# Patient Record
Sex: FEMALE | Race: WHITE | NOT HISPANIC OR LATINO | Employment: FULL TIME | URBAN - METROPOLITAN AREA
[De-identification: names, ages, dates, MRNs, and addresses within clinical notes are randomized per-mention and may not be internally consistent; named-entity substitution may affect disease eponyms.]

---

## 2023-05-16 ENCOUNTER — OFFICE VISIT (OUTPATIENT)
Dept: PRIMARY CARE | Facility: CLINIC | Age: 43
End: 2023-05-16
Payer: COMMERCIAL

## 2023-05-16 VITALS
SYSTOLIC BLOOD PRESSURE: 120 MMHG | HEART RATE: 99 BPM | BODY MASS INDEX: 46.66 KG/M2 | WEIGHT: 231 LBS | DIASTOLIC BLOOD PRESSURE: 80 MMHG

## 2023-05-16 DIAGNOSIS — E03.9 ACQUIRED HYPOTHYROIDISM: ICD-10-CM

## 2023-05-16 DIAGNOSIS — K21.9 GASTROESOPHAGEAL REFLUX DISEASE WITHOUT ESOPHAGITIS: ICD-10-CM

## 2023-05-16 DIAGNOSIS — J45.20 MILD INTERMITTENT ASTHMA WITHOUT COMPLICATION (HHS-HCC): ICD-10-CM

## 2023-05-16 DIAGNOSIS — E55.9 VITAMIN D DEFICIENCY: ICD-10-CM

## 2023-05-16 DIAGNOSIS — Z00.00 ANNUAL PHYSICAL EXAM: Primary | ICD-10-CM

## 2023-05-16 DIAGNOSIS — F41.9 ANXIETY: ICD-10-CM

## 2023-05-16 DIAGNOSIS — I10 ESSENTIAL HYPERTENSION: ICD-10-CM

## 2023-05-16 DIAGNOSIS — E66.01 MORBIDLY OBESE (MULTI): ICD-10-CM

## 2023-05-16 DIAGNOSIS — E78.2 MIXED HYPERLIPIDEMIA: ICD-10-CM

## 2023-05-16 DIAGNOSIS — G47.33 OSA (OBSTRUCTIVE SLEEP APNEA): ICD-10-CM

## 2023-05-16 DIAGNOSIS — R60.0 LOCALIZED EDEMA: ICD-10-CM

## 2023-05-16 PROBLEM — R79.89 ABNORMAL TSH: Status: RESOLVED | Noted: 2023-05-16 | Resolved: 2023-05-16

## 2023-05-16 PROBLEM — J30.2 SEASONAL ALLERGIES: Status: ACTIVE | Noted: 2023-05-16

## 2023-05-16 PROBLEM — R87.810 ASCUS WITH POSITIVE HIGH RISK HPV CERVICAL: Status: ACTIVE | Noted: 2023-05-16

## 2023-05-16 PROBLEM — J30.9 ALLERGIC RHINITIS: Status: ACTIVE | Noted: 2023-05-16

## 2023-05-16 PROBLEM — E28.2 PCOS (POLYCYSTIC OVARIAN SYNDROME): Status: ACTIVE | Noted: 2023-05-16

## 2023-05-16 PROBLEM — R87.610 ASCUS WITH POSITIVE HIGH RISK HPV CERVICAL: Status: ACTIVE | Noted: 2023-05-16

## 2023-05-16 PROBLEM — F31.9 BIPOLAR AFFECTIVE DISORDER (MULTI): Status: ACTIVE | Noted: 2023-05-16

## 2023-05-16 PROBLEM — Z98.84 S/P BARIATRIC SURGERY: Status: ACTIVE | Noted: 2023-05-16

## 2023-05-16 PROBLEM — Z97.5 IUD (INTRAUTERINE DEVICE) IN PLACE: Status: ACTIVE | Noted: 2023-05-16

## 2023-05-16 PROBLEM — E78.5 HLD (HYPERLIPIDEMIA): Status: ACTIVE | Noted: 2023-05-16

## 2023-05-16 PROBLEM — K59.09 CHRONIC CONSTIPATION: Status: ACTIVE | Noted: 2023-05-16

## 2023-05-16 PROBLEM — M54.12 CERVICAL RADICULOPATHY: Status: ACTIVE | Noted: 2023-05-16

## 2023-05-16 PROBLEM — L68.0 HIRSUTISM: Status: ACTIVE | Noted: 2023-05-16

## 2023-05-16 PROBLEM — K22.70 BARRETT ESOPHAGUS: Status: ACTIVE | Noted: 2023-05-16

## 2023-05-16 PROBLEM — M54.2 CHRONIC CERVICAL PAIN: Status: ACTIVE | Noted: 2023-05-16

## 2023-05-16 PROBLEM — M19.90 ARTHRITIS: Status: ACTIVE | Noted: 2023-05-16

## 2023-05-16 PROBLEM — G89.29 CHRONIC CERVICAL PAIN: Status: ACTIVE | Noted: 2023-05-16

## 2023-05-16 PROBLEM — D06.9 CIN III (CERVICAL INTRAEPITHELIAL NEOPLASIA GRADE III) WITH SEVERE DYSPLASIA: Status: ACTIVE | Noted: 2023-05-16

## 2023-05-16 PROBLEM — R79.89 ABNORMAL TSH: Status: ACTIVE | Noted: 2023-05-16

## 2023-05-16 PROCEDURE — 99396 PREV VISIT EST AGE 40-64: CPT | Performed by: NURSE PRACTITIONER

## 2023-05-16 PROCEDURE — 3079F DIAST BP 80-89 MM HG: CPT | Performed by: NURSE PRACTITIONER

## 2023-05-16 PROCEDURE — 1036F TOBACCO NON-USER: CPT | Performed by: NURSE PRACTITIONER

## 2023-05-16 PROCEDURE — 99213 OFFICE O/P EST LOW 20 MIN: CPT | Performed by: NURSE PRACTITIONER

## 2023-05-16 PROCEDURE — 3008F BODY MASS INDEX DOCD: CPT | Performed by: NURSE PRACTITIONER

## 2023-05-16 PROCEDURE — 3074F SYST BP LT 130 MM HG: CPT | Performed by: NURSE PRACTITIONER

## 2023-05-16 RX ORDER — CALCIUM CIT/MAG/D3/ZN/COP/MANG 250-40-125
1 TABLET ORAL DAILY
COMMUNITY
Start: 2020-12-16

## 2023-05-16 RX ORDER — BENZOCAINE .13; .15; .5; 2 G/100G; G/100G; G/100G; G/100G
GEL ORAL
COMMUNITY
Start: 2022-11-14

## 2023-05-16 RX ORDER — FUROSEMIDE 20 MG/1
10 TABLET ORAL DAILY PRN
Qty: 30 TABLET | Refills: 0 | Status: SHIPPED | OUTPATIENT
Start: 2023-05-17 | End: 2024-03-06 | Stop reason: WASHOUT

## 2023-05-16 RX ORDER — LANOLIN ALCOHOL/MO/W.PET/CERES
1 CREAM (GRAM) TOPICAL DAILY
COMMUNITY
Start: 2020-12-16

## 2023-05-16 RX ORDER — ARIPIPRAZOLE 5 MG/1
0.5 TABLET ORAL DAILY
COMMUNITY
Start: 2022-12-07

## 2023-05-16 RX ORDER — FENTANYL 25 UG/1
PATCH TRANSDERMAL
COMMUNITY
Start: 2022-06-24 | End: 2023-10-06 | Stop reason: DRUGHIGH

## 2023-05-16 RX ORDER — MONTELUKAST SODIUM 10 MG/1
1 TABLET ORAL NIGHTLY
COMMUNITY
Start: 2023-04-19

## 2023-05-16 RX ORDER — CHOLECALCIFEROL (VITAMIN D3) 125 MCG
1 CAPSULE ORAL DAILY
COMMUNITY
End: 2024-03-06 | Stop reason: WASHOUT

## 2023-05-16 RX ORDER — SEMAGLUTIDE 0.25 MG/.5ML
0.25 INJECTION, SOLUTION SUBCUTANEOUS
Qty: 2 ML | Refills: 0 | Status: SHIPPED | OUTPATIENT
Start: 2023-05-16 | End: 2023-06-19 | Stop reason: ALTCHOICE

## 2023-05-16 RX ORDER — FOLIC ACID/VIT BCOMP,C/CU/ZINC 5-1.5-25MG
1 TABLET ORAL DAILY
COMMUNITY
Start: 2022-05-17 | End: 2024-03-06 | Stop reason: WASHOUT

## 2023-05-16 RX ORDER — MINERAL OIL
ENEMA (ML) RECTAL
COMMUNITY
Start: 2017-08-23

## 2023-05-16 RX ORDER — SERTRALINE HYDROCHLORIDE 100 MG/1
1 TABLET, FILM COATED ORAL DAILY
COMMUNITY

## 2023-05-16 RX ORDER — MYCOPHENOLATE MOFETIL 250 MG/1
CAPSULE ORAL
COMMUNITY
Start: 2022-12-09 | End: 2023-10-02 | Stop reason: SDUPTHER

## 2023-05-16 RX ORDER — OXYCODONE HYDROCHLORIDE 5 MG/1
1 TABLET ORAL EVERY 6 HOURS PRN
COMMUNITY
Start: 2021-09-17 | End: 2023-10-06 | Stop reason: SDUPTHER

## 2023-05-16 RX ORDER — ALBUTEROL SULFATE 90 UG/1
AEROSOL, METERED RESPIRATORY (INHALATION)
COMMUNITY
Start: 2018-04-11

## 2023-05-16 RX ORDER — TOPIRAMATE 100 MG/1
1 TABLET, COATED ORAL DAILY
COMMUNITY
Start: 2023-02-22 | End: 2023-11-21 | Stop reason: SDUPTHER

## 2023-05-16 RX ORDER — LEVOTHYROXINE SODIUM 200 UG/1
TABLET ORAL
COMMUNITY
Start: 2018-12-07 | End: 2023-06-20 | Stop reason: SDUPTHER

## 2023-05-16 RX ORDER — LORAZEPAM 0.5 MG/1
1 TABLET ORAL 3 TIMES DAILY PRN
COMMUNITY
Start: 2021-09-17 | End: 2023-10-06 | Stop reason: SDUPTHER

## 2023-05-16 RX ORDER — LAMOTRIGINE 100 MG/1
1 TABLET ORAL DAILY
COMMUNITY
Start: 2022-11-03

## 2023-05-16 NOTE — PROGRESS NOTES
"Subjective   Reason for Visit: Isaias Chamorro is an 42 y.o. female here for a CPE     Chief Complaint   Patient presents with    Annual Exam     ISAIAS IS HERE TODAY FOR YEARLY CPE.   PT WOULD LIKE TO DISCUSS WATER RETENTION IN HER LEGS THAT HAS GOTTEN WORSE.        HPI  Est 43 yo female presents today for CPE    Pt was last seen in 2022 which was following hospitalization for Pneumonitis  Pt was tapering steroids at that time  Started on Victoza due to elevated FBG  Was wearing O2 until     Pt saw GYN last week for colposcopy d/t ASCUS/HRHPV + and had IUD replaced (due out 2031)  MAMM: DUE 2024    Pt states she was on steroids for @ 10 months, just weaned off @ 1-2 weeks ago  Very tearful in office today d/t weight gain   States she is joining the [employer] tomorrow   Pt states Wegovy is $40 for her so she would like to try it     Pt c/o \"fluid build up in her BLE\"   Denies SOB at rest  Will get slight SOB with exertion  Off home O2 x 1 month    Pt is Rx Fentanyl and Roxicodone for pain @ left shoulder area that was c/b HL tumor Rx are per Palliative Care, Clare MERRITT at [facility]     Labs from April reviewed with pt during OV today    Patient Care Team:  SAVANAH Jacobson as PCP - General (Family Medicine)  SAVANAH Maldonado as PCP - [place] [facility] P[facility]     Review of Systems  All 13 systems were reviewed and are within normal limits except positive and pertinent negative responses which are noted below or in HPI.      Objective   Vitals:  /80   Pulse 99   Wt 105 kg (231 lb)   BMI 46.66 kg/m²       Physical Exam  Vitals reviewed.   HENT:      Mouth/Throat:      Mouth: Mucous membranes are moist.   Cardiovascular:      Pulses: Normal pulses.   Pulmonary:      Effort: Pulmonary effort is normal.   Musculoskeletal:      Right lower le+ Edema present.      Left lower le+ Edema present.   Skin:     General: Skin is warm and dry.   Neurological:      Mental Status: She is alert. "   Psychiatric:      Comments: Tearful , Frustrated          Assessment/Plan   Problem List Items Addressed This Visit          Nervous    RESOLVED: LELIA (obstructive sleep apnea)       Respiratory    Mild intermittent asthma     Rx Albuterol PRN             Circulatory    RESOLVED: Essential hypertension    Relevant Orders    Potassium       Digestive    RESOLVED: GERD (gastroesophageal reflux disease)       Endocrine/Metabolic    Hypothyroidism     Rx Levothyroxine 200 mcg x 5 days/week  Due for TSH          Relevant Orders    TSH with reflex to Free T4 if abnormal    Vitamin D deficiency       Other    Anxiety    HLD (hyperlipidemia)     Other Visit Diagnoses       Annual physical exam    -  Primary    BMI 45.0-49.9, adult (CMS/[facility])        Relevant Medications    semaglutide, weight loss, (Wegovy) 0.25 mg/0.5 mL pen injector    Morbidly obese (CMS/[facility])        Relevant Medications    semaglutide, weight loss, (Wegovy) 0.25 mg/0.5 mL pen injector    Localized edema        Relevant Medications    furosemide (Lasix) 20 mg tablet (Start on 5/17/2023)    Other Relevant Orders    Potassium    Compression Stockings 18-30 mmHg

## 2023-05-16 NOTE — ASSESSMENT & PLAN NOTE
Dx'd 2013  Seeing Dr. Clark (pulm) and Shilpa (Oncology) at [facility]   Remission x 2 years (since 2021)

## 2023-05-16 NOTE — PATIENT INSTRUCTIONS
Thank you for seeing me today.  It was a pleasure to see you again!    Today we did your Annual Physical/Medicare Wellness Exam and discussed the following:     Continue all medications    Begin Lasix 10 mg PRN for LE edema  Get lab draw in 3 weeks to check potassium     Begin Wegovy for weight loss    Get compression stockings     RTC 3 MONTHS

## 2023-06-15 ENCOUNTER — LAB (OUTPATIENT)
Dept: LAB | Facility: LAB | Age: 43
End: 2023-06-15
Payer: COMMERCIAL

## 2023-06-15 DIAGNOSIS — E03.9 ACQUIRED HYPOTHYROIDISM: ICD-10-CM

## 2023-06-15 DIAGNOSIS — R60.0 LOCALIZED EDEMA: ICD-10-CM

## 2023-06-15 DIAGNOSIS — I10 ESSENTIAL HYPERTENSION: ICD-10-CM

## 2023-06-15 LAB
POTASSIUM (MMOL/L) IN SER/PLAS: 3.7 MMOL/L (ref 3.5–5.3)
THYROTROPIN (MIU/L) IN SER/PLAS BY DETECTION LIMIT <= 0.05 MIU/L: 53 MIU/L (ref 0.44–3.98)
THYROXINE (T4) FREE (NG/DL) IN SER/PLAS: 0.52 NG/DL (ref 0.61–1.12)

## 2023-06-15 PROCEDURE — 84439 ASSAY OF FREE THYROXINE: CPT

## 2023-06-15 PROCEDURE — 84132 ASSAY OF SERUM POTASSIUM: CPT

## 2023-06-15 PROCEDURE — 36415 COLL VENOUS BLD VENIPUNCTURE: CPT

## 2023-06-15 PROCEDURE — 84443 ASSAY THYROID STIM HORMONE: CPT

## 2023-06-19 DIAGNOSIS — E66.01 MORBID OBESITY WITH BODY MASS INDEX (BMI) OF 45.0 TO 49.9 IN ADULT (MULTI): ICD-10-CM

## 2023-06-19 DIAGNOSIS — E28.2 PCOS (POLYCYSTIC OVARIAN SYNDROME): Primary | ICD-10-CM

## 2023-06-19 RX ORDER — SEMAGLUTIDE 0.5 MG/.5ML
0.5 INJECTION, SOLUTION SUBCUTANEOUS
Qty: 2 ML | Refills: 0 | Status: SHIPPED | OUTPATIENT
Start: 2023-06-19 | End: 2023-08-15 | Stop reason: SDUPTHER

## 2023-06-20 DIAGNOSIS — E03.9 ACQUIRED HYPOTHYROIDISM: Primary | ICD-10-CM

## 2023-06-20 RX ORDER — LEVOTHYROXINE SODIUM 200 UG/1
200 TABLET ORAL
Qty: 90 TABLET | Refills: 0 | Status: SHIPPED | OUTPATIENT
Start: 2023-06-20 | End: 2023-08-30 | Stop reason: SDUPTHER

## 2023-08-15 DIAGNOSIS — E66.01 MORBID OBESITY WITH BODY MASS INDEX (BMI) OF 45.0 TO 49.9 IN ADULT (MULTI): ICD-10-CM

## 2023-08-15 DIAGNOSIS — J45.20 MILD INTERMITTENT ASTHMA, UNSPECIFIED WHETHER COMPLICATED (HHS-HCC): Primary | ICD-10-CM

## 2023-08-15 RX ORDER — SEMAGLUTIDE 0.5 MG/.5ML
0.5 INJECTION, SOLUTION SUBCUTANEOUS
Qty: 2 ML | Refills: 0 | Status: SHIPPED | OUTPATIENT
Start: 2023-08-15 | End: 2023-11-06 | Stop reason: ALTCHOICE

## 2023-08-26 ENCOUNTER — LAB (OUTPATIENT)
Dept: LAB | Facility: LAB | Age: 43
End: 2023-08-26
Payer: COMMERCIAL

## 2023-08-26 DIAGNOSIS — E03.9 ACQUIRED HYPOTHYROIDISM: ICD-10-CM

## 2023-08-26 LAB
THYROTROPIN (MIU/L) IN SER/PLAS BY DETECTION LIMIT <= 0.05 MIU/L: 6.22 MIU/L (ref 0.44–3.98)
THYROXINE (T4) FREE (NG/DL) IN SER/PLAS: 0.85 NG/DL (ref 0.61–1.12)

## 2023-08-26 PROCEDURE — 84439 ASSAY OF FREE THYROXINE: CPT

## 2023-08-26 PROCEDURE — 84443 ASSAY THYROID STIM HORMONE: CPT

## 2023-08-26 PROCEDURE — 36415 COLL VENOUS BLD VENIPUNCTURE: CPT

## 2023-08-30 DIAGNOSIS — E03.9 ACQUIRED HYPOTHYROIDISM: ICD-10-CM

## 2023-08-30 RX ORDER — LEVOTHYROXINE SODIUM 200 UG/1
TABLET ORAL
Qty: 96 TABLET | Refills: 3 | Status: SHIPPED | OUTPATIENT
Start: 2023-08-30 | End: 2024-03-14 | Stop reason: SDUPTHER

## 2023-09-12 PROBLEM — Z99.81 ON HOME OXYGEN THERAPY: Status: ACTIVE | Noted: 2023-09-12

## 2023-09-12 PROBLEM — J98.4 DRUG-INDUCED PNEUMONITIS: Status: ACTIVE | Noted: 2023-09-12

## 2023-09-12 PROBLEM — F32.A ANXIETY AND DEPRESSION: Status: ACTIVE | Noted: 2023-09-12

## 2023-09-12 PROBLEM — J96.91 RESPIRATORY FAILURE WITH HYPOXIA (MULTI): Status: ACTIVE | Noted: 2023-09-12

## 2023-09-12 PROBLEM — L30.9 ECZEMA: Status: ACTIVE | Noted: 2023-09-12

## 2023-09-12 PROBLEM — L91.0 HYPERTROPHIC SCAR: Status: ACTIVE | Noted: 2021-09-27

## 2023-09-12 PROBLEM — E11.9 DIABETES MELLITUS WITHOUT COMPLICATION (MULTI): Status: ACTIVE | Noted: 2023-09-12

## 2023-09-12 PROBLEM — J96.00 ACUTE RESPIRATORY FAILURE (MULTI): Status: ACTIVE | Noted: 2023-09-12

## 2023-09-12 PROBLEM — D73.5 SPLENIC INFARCTION: Status: ACTIVE | Noted: 2023-09-12

## 2023-09-12 PROBLEM — R09.02 HYPOXEMIA REQUIRING SUPPLEMENTAL OXYGEN: Status: ACTIVE | Noted: 2023-09-12

## 2023-09-12 PROBLEM — E66.01 MORBID OBESITY (MULTI): Status: ACTIVE | Noted: 2023-09-12

## 2023-09-12 PROBLEM — G62.9 POLYNEUROPATHY: Status: ACTIVE | Noted: 2023-09-12

## 2023-09-12 PROBLEM — R09.02 HYPOXIA: Status: ACTIVE | Noted: 2023-09-12

## 2023-09-12 PROBLEM — J38.3 VOCAL CORD DYSFUNCTION: Status: ACTIVE | Noted: 2023-09-12

## 2023-09-12 PROBLEM — M79.7 FIBROMYALGIA: Status: ACTIVE | Noted: 2023-09-12

## 2023-09-12 PROBLEM — D63.8 ANEMIA IN OTHER CHRONIC DISEASES CLASSIFIED ELSEWHERE: Status: ACTIVE | Noted: 2023-09-12

## 2023-09-12 PROBLEM — K90.9 INTESTINAL MALABSORPTION (HHS-HCC): Status: ACTIVE | Noted: 2023-09-12

## 2023-09-12 PROBLEM — J45.909 ASTHMA (HHS-HCC): Status: ACTIVE | Noted: 2023-09-12

## 2023-09-12 PROBLEM — M51.34 THORACIC DEGENERATIVE DISC DISEASE: Status: ACTIVE | Noted: 2023-09-12

## 2023-09-12 PROBLEM — J98.59 MEDIASTINAL MASS: Status: ACTIVE | Noted: 2023-09-12

## 2023-09-12 PROBLEM — J45.991 COUGH VARIANT ASTHMA (HHS-HCC): Status: ACTIVE | Noted: 2023-09-12

## 2023-09-12 PROBLEM — T50.905A DRUG-INDUCED PNEUMONITIS: Status: ACTIVE | Noted: 2023-09-12

## 2023-09-12 PROBLEM — E87.6 CHRONIC HYPOKALEMIA: Status: ACTIVE | Noted: 2023-09-12

## 2023-09-12 PROBLEM — N39.3 URINARY, INCONTINENCE, STRESS FEMALE: Status: ACTIVE | Noted: 2023-09-12

## 2023-09-12 PROBLEM — Z99.81 HYPOXEMIA REQUIRING SUPPLEMENTAL OXYGEN: Status: ACTIVE | Noted: 2023-09-12

## 2023-09-12 PROBLEM — F11.10 OPIOID ABUSE (MULTI): Status: ACTIVE | Noted: 2023-09-12

## 2023-09-12 PROBLEM — R93.89 ABNORMAL COMPUTERIZED AXIAL TOMOGRAPHY OF CHEST: Status: ACTIVE | Noted: 2023-09-12

## 2023-09-12 PROBLEM — M79.2: Status: ACTIVE | Noted: 2023-09-12

## 2023-09-12 PROBLEM — J18.9 MULTIFOCAL PNEUMONIA: Status: ACTIVE | Noted: 2023-09-12

## 2023-09-12 PROBLEM — K13.79 UVULAR SWELLING: Status: ACTIVE | Noted: 2023-09-12

## 2023-09-12 PROBLEM — I42.9 MYOCARDIOPATHY (MULTI): Status: ACTIVE | Noted: 2023-09-12

## 2023-09-12 PROBLEM — Z94.81 STATUS POST BONE MARROW TRANSPLANT (MULTI): Status: ACTIVE | Noted: 2023-09-12

## 2023-09-12 PROBLEM — F41.9 ANXIETY AND DEPRESSION: Status: ACTIVE | Noted: 2023-09-12

## 2023-09-12 RX ORDER — OMEPRAZOLE 40 MG/1
40 CAPSULE, DELAYED RELEASE ORAL
COMMUNITY
Start: 2021-08-12 | End: 2024-03-06 | Stop reason: WASHOUT

## 2023-09-12 RX ORDER — MULTIVIT-MIN/IRON/FOLIC ACID/K 45-800-120
CAPSULE ORAL 2 TIMES DAILY
COMMUNITY
End: 2024-03-06 | Stop reason: WASHOUT

## 2023-09-12 RX ORDER — OMEPRAZOLE 20 MG/1
20 CAPSULE, DELAYED RELEASE ORAL
COMMUNITY
End: 2023-10-06 | Stop reason: DRUGHIGH

## 2023-09-12 RX ORDER — CYANOCOBALAMIN (VITAMIN B-12) 500 MCG
2 TABLET ORAL DAILY
COMMUNITY
Start: 2020-12-14 | End: 2024-03-06 | Stop reason: WASHOUT

## 2023-09-12 RX ORDER — ONDANSETRON 4 MG/1
4 TABLET, FILM COATED ORAL EVERY 4 HOURS PRN
COMMUNITY
Start: 2012-12-24 | End: 2023-10-06 | Stop reason: SDUPTHER

## 2023-09-12 RX ORDER — POTASSIUM CHLORIDE 20 MEQ/1
20 TABLET, EXTENDED RELEASE ORAL 2 TIMES DAILY
COMMUNITY
End: 2024-03-06 | Stop reason: WASHOUT

## 2023-09-12 RX ORDER — FENTANYL 12.5 UG/1
PATCH TRANSDERMAL
COMMUNITY
Start: 2023-08-11 | End: 2023-10-06 | Stop reason: SDUPTHER

## 2023-09-12 RX ORDER — PEMBROLIZUMAB 25 MG/ML
INJECTION, SOLUTION INTRAVENOUS
COMMUNITY
End: 2024-03-06 | Stop reason: WASHOUT

## 2023-09-12 RX ORDER — POTASSIUM CHLORIDE 750 MG/1
10 TABLET, FILM COATED, EXTENDED RELEASE ORAL DAILY
COMMUNITY
Start: 2020-06-10 | End: 2024-03-06 | Stop reason: WASHOUT

## 2023-09-12 RX ORDER — TIZANIDINE 2 MG/1
TABLET ORAL
COMMUNITY
Start: 2023-05-16 | End: 2023-10-06 | Stop reason: SDUPTHER

## 2023-09-12 RX ORDER — CALCIUM CARB, CITRATE, MALATE 250 MG
1 CAPSULE ORAL DAILY
COMMUNITY
End: 2024-03-06 | Stop reason: WASHOUT

## 2023-09-12 RX ORDER — URSODIOL 300 MG/1
300 CAPSULE ORAL 2 TIMES DAILY
COMMUNITY
Start: 2020-11-23 | End: 2024-03-06 | Stop reason: WASHOUT

## 2023-09-12 RX ORDER — ACETAMINOPHEN 500 MG
1 TABLET ORAL DAILY
COMMUNITY

## 2023-09-12 RX ORDER — DIAPER,BRIEF,INFANT-TODD,DISP
1 EACH MISCELLANEOUS 2 TIMES DAILY
COMMUNITY
End: 2024-03-06 | Stop reason: WASHOUT

## 2023-09-12 RX ORDER — ZONISAMIDE 100 MG/1
2 CAPSULE ORAL NIGHTLY
COMMUNITY
Start: 2022-10-25 | End: 2024-03-06 | Stop reason: WASHOUT

## 2023-09-12 RX ORDER — BUPROPION HYDROCHLORIDE 300 MG/1
300 TABLET ORAL
COMMUNITY
Start: 2013-05-07 | End: 2024-03-06 | Stop reason: WASHOUT

## 2023-09-12 RX ORDER — SENNOSIDES 8.6 MG/1
8.6 TABLET ORAL 2 TIMES DAILY
COMMUNITY
End: 2024-03-06 | Stop reason: WASHOUT

## 2023-09-12 RX ORDER — PANTOPRAZOLE SODIUM 40 MG/1
40 TABLET, DELAYED RELEASE ORAL 2 TIMES DAILY
COMMUNITY
End: 2024-03-06 | Stop reason: WASHOUT

## 2023-09-12 RX ORDER — RAMIPRIL 2.5 MG/1
2.5 CAPSULE ORAL NIGHTLY
COMMUNITY
End: 2024-03-06 | Stop reason: WASHOUT

## 2023-09-12 RX ORDER — TOPIRAMATE 50 MG/1
TABLET, FILM COATED ORAL
COMMUNITY
Start: 2023-01-27 | End: 2023-11-21 | Stop reason: ALTCHOICE

## 2023-09-12 RX ORDER — AMOXICILLIN 250 MG
1 CAPSULE ORAL NIGHTLY PRN
COMMUNITY
End: 2024-03-06 | Stop reason: WASHOUT

## 2023-09-12 RX ORDER — POLYETHYLENE GLYCOL 3350 17 G/17G
POWDER, FOR SOLUTION ORAL
COMMUNITY
End: 2024-04-12 | Stop reason: WASHOUT

## 2023-09-12 RX ORDER — PEN NEEDLE, DIABETIC 29 G X1/2"
NEEDLE, DISPOSABLE MISCELLANEOUS
COMMUNITY
Start: 2015-06-18 | End: 2024-03-06 | Stop reason: WASHOUT

## 2023-09-12 RX ORDER — NORTRIPTYLINE HYDROCHLORIDE 10 MG/1
CAPSULE ORAL
COMMUNITY
Start: 2022-09-30 | End: 2023-10-06 | Stop reason: ALTCHOICE

## 2023-09-12 RX ORDER — ACETAMINOPHEN AND PHENYLEPHRINE HCL 325; 5 MG/1; MG/1
1 TABLET ORAL DAILY
COMMUNITY

## 2023-09-12 RX ORDER — SERTRALINE HYDROCHLORIDE 150 MG/1
1 CAPSULE ORAL DAILY
COMMUNITY
End: 2024-03-06 | Stop reason: WASHOUT

## 2023-09-12 RX ORDER — DOCUSATE SODIUM 100 MG/1
100 CAPSULE, LIQUID FILLED ORAL 2 TIMES DAILY
COMMUNITY
End: 2024-03-06 | Stop reason: WASHOUT

## 2023-09-12 RX ORDER — CARVEDILOL 6.25 MG/1
6.25 TABLET ORAL
COMMUNITY
End: 2024-03-06 | Stop reason: WASHOUT

## 2023-09-12 RX ORDER — FERROUS SULFATE 325(65) MG
65 TABLET, DELAYED RELEASE (ENTERIC COATED) ORAL DAILY
COMMUNITY
Start: 2020-12-14

## 2023-09-12 RX ORDER — PANTOPRAZOLE SODIUM 20 MG/1
20 TABLET, DELAYED RELEASE ORAL 2 TIMES DAILY
COMMUNITY
End: 2024-03-06 | Stop reason: SDUPTHER

## 2023-09-12 RX ORDER — SPIRONOLACTONE 100 MG/1
100 TABLET, FILM COATED ORAL DAILY
COMMUNITY
End: 2024-03-06 | Stop reason: SINTOL

## 2023-09-12 RX ORDER — ONDANSETRON HYDROCHLORIDE 8 MG/1
8 TABLET, FILM COATED ORAL 3 TIMES DAILY PRN
COMMUNITY
Start: 2017-01-09

## 2023-09-12 RX ORDER — LORATADINE 10 MG/1
10 TABLET ORAL
COMMUNITY
End: 2024-03-06 | Stop reason: WASHOUT

## 2023-09-25 PROBLEM — R73.01 ABNORMAL FASTING GLUCOSE: Status: ACTIVE | Noted: 2023-09-25

## 2023-09-25 PROBLEM — E66.9 CLASS 2 OBESITY WITH BODY MASS INDEX (BMI) OF 36.0 TO 36.9 IN ADULT: Status: ACTIVE | Noted: 2023-09-12

## 2023-09-25 PROBLEM — E66.812 CLASS 2 OBESITY WITH BODY MASS INDEX (BMI) OF 36.0 TO 36.9 IN ADULT: Status: ACTIVE | Noted: 2023-09-12

## 2023-09-25 RX ORDER — LEVOTHYROXINE SODIUM 200 UG/1
200 TABLET ORAL DAILY
COMMUNITY
End: 2024-03-06 | Stop reason: SDUPTHER

## 2023-10-02 ENCOUNTER — OFFICE VISIT (OUTPATIENT)
Dept: PULMONOLOGY | Facility: HOSPITAL | Age: 43
End: 2023-10-02
Payer: COMMERCIAL

## 2023-10-02 VITALS
OXYGEN SATURATION: 99 % | WEIGHT: 209 LBS | BODY MASS INDEX: 42.21 KG/M2 | HEART RATE: 85 BPM | SYSTOLIC BLOOD PRESSURE: 113 MMHG | TEMPERATURE: 97.2 F | DIASTOLIC BLOOD PRESSURE: 81 MMHG

## 2023-10-02 DIAGNOSIS — T50.905A DRUG-INDUCED PNEUMONITIS: ICD-10-CM

## 2023-10-02 DIAGNOSIS — J84.89 ORGANIZING PNEUMONIA (MULTI): Primary | ICD-10-CM

## 2023-10-02 DIAGNOSIS — J98.4 DRUG-INDUCED PNEUMONITIS: ICD-10-CM

## 2023-10-02 DIAGNOSIS — R06.09 DYSPNEA ON EXERTION: ICD-10-CM

## 2023-10-02 PROCEDURE — 99214 OFFICE O/P EST MOD 30 MIN: CPT | Mod: GC | Performed by: INTERNAL MEDICINE

## 2023-10-02 PROCEDURE — 3074F SYST BP LT 130 MM HG: CPT | Performed by: INTERNAL MEDICINE

## 2023-10-02 PROCEDURE — 4010F ACE/ARB THERAPY RXD/TAKEN: CPT | Performed by: INTERNAL MEDICINE

## 2023-10-02 PROCEDURE — 3079F DIAST BP 80-89 MM HG: CPT | Performed by: INTERNAL MEDICINE

## 2023-10-02 PROCEDURE — 99214 OFFICE O/P EST MOD 30 MIN: CPT | Performed by: INTERNAL MEDICINE

## 2023-10-02 PROCEDURE — 1036F TOBACCO NON-USER: CPT | Performed by: INTERNAL MEDICINE

## 2023-10-02 PROCEDURE — 3008F BODY MASS INDEX DOCD: CPT | Performed by: INTERNAL MEDICINE

## 2023-10-02 RX ORDER — MYCOPHENOLATE MOFETIL 250 MG/1
500 CAPSULE ORAL 2 TIMES DAILY
Qty: 120 CAPSULE | Refills: 1 | Status: SHIPPED | OUTPATIENT
Start: 2023-10-02 | End: 2023-12-31

## 2023-10-02 NOTE — PATIENT INSTRUCTIONS
Nice to meet you MsRiddhi Zhao Pedro Pablo!  We discussed the following today:  - we will refill your Cellcept prescription, however, we will reduce the dose to 500mg twice a day.  - We will request blood work to be done to check on your liver & blood count while you are on Cellcept.  - Will request lung function & walking test & CAT scan to evaluate the current condition of your lung.    We will see you in 1 month

## 2023-10-05 NOTE — PROGRESS NOTES
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    Last visit was 4/19/23. Breathing is ok, but gets HANLEY whenever she gets up and moves around. She is off prednisone since early 5/2023 & continued on Cellcept at 1g BID, however, she almost ran out her Cellcept because she couldn't get refills  & have to wait until next appointment, so she reduced the dose couple of weeks ago to 250mg BID & currently 250mg once a day. Since she reduced the dose, she reported more HANLEY.      PMH as stated above  SH: bariatric surgery  FH: IPF (grandmother, aunt), unclear if familial IPF as aunt does have RA  Social Hx: Denies tobacco use, EtOH use or illicit drug use; pharmacy tech, now on disability    PFT 4/17/23:  No obstruction on spirometry, normal TLC, DLCO reduced    6MWT 4/17/23:  Walked 273m (predicted 380.9), so some functional impairment. Oxygenation was abnormal in that SpO2 dropped from 100% to 92% while breathing RA.     Bronchoscopy with BAL of RML and LLL on 12/1/22:  -Cell count with diff showed monocyte predominance on both BALs.   -Cultures with NGTD  -Cytology negative.     CT CHEST 10/11/22:  1. Change in pattern of multifocal pulmonary infiltrates withsignificant improvement in multifocal basilar airspace opacities andnew foci of left lower lobe and right basilar multifocal ground-glassopacities. This  may represent change in patterns of drug inducedpneumonitis. Consider bronchoscopic evaluation of the anteriorsegment of the left lower lobe if there is any concern forsuperimposed infectious etiologies.    Bronchoscopy with BAL 8/22/22- cultures, EBV PCR, CMV PCR, PJP PCR, galactomannan have all been negative.   -Cell count w/diff showed mononuclear predominance (84%)  -Cytology was negative for malignant cells or viral inclusions, did have alveolar macrophages present.   -CD4/CD8 ratio was low at 0.83    CT CHEST 4/7/23:  1. Interval near complete resolution of ground-glass opacities with mild residual ground-glass opacities and mild associated traction bronchiectasis in the central paramediastinal aspect of the bilateral upper and lower lobes as described above.    CT CHEST 8/17/22:  1.There is no evidence of pulmonary embolus.  2.Bilateral airspace disease is present. Prominent mediastinal lymph nodes are present, likely reactive.       Immunization History:  Immunization History   Administered Date(s) Administered    Flu vaccine (IIV4), preservative free *Check age/dose* 09/30/2020, 09/22/2022    Hep B, Unspecified 04/27/2015, 07/20/2015, 07/05/2016, 09/19/2016, 01/20/2017    HiB, unspecified 04/27/2015, 07/05/2016, 09/19/2016, 01/20/2017    Influenza, Unspecified 10/17/2019    Influenza, injectable, MDCK, preservative free, quadrivalent 09/30/2017, 10/27/2019    Influenza, injectable, quadrivalent 10/01/2019, 09/15/2020    Influenza, seasonal, injectable 10/09/2015, 11/02/2015    PPD Test 01/03/2012, 01/17/2012    Pfizer Gray Cap SARS-CoV-2 07/03/2022    Pfizer Purple Cap SARS-CoV-2 10/08/2021, 10/29/2021, 02/10/2022    Pneumococcal conjugate vaccine, 13-valent (PREVNAR 13) 04/27/2015, 07/05/2016, 09/19/2016, 01/20/2017    Pneumococcal polysaccharide vaccine, 23-valent, age 2 years and older (PNEUMOVAX 23) 11/02/2015    Polio, Unspecified 04/27/2015, 07/20/2015, 07/05/2016, 09/19/2016, 01/20/2017    Tdap  vaccine, age 7 year and older (BOOSTRIX) 04/27/2015, 07/20/2015, 07/05/2016, 09/19/2016, 01/20/2017       Current Medications:  Current Outpatient Medications   Medication Instructions    albuterol 90 mcg/actuation inhaler inhalation    ARIPiprazole (Abilify) 5 mg tablet 0.5 tablets, oral, Daily    ascorbic acid (VITAMIN C) 100 mg, oral, Daily    biotin 10,000 mcg capsule 1 capsule, oral, Daily    blood sugar diagnostic strip as directed dg E11.9 daily for 30 day(s)    budesonide (Rhinocort AQ) 32 mcg/actuation nasal spray nasal    buPROPion XL (WELLBUTRIN XL) 300 mg, oral, Daily RT    calcium cit-mag-D3-Zn--maxine (Calcium Citrate Plus) 706--3.75 mg-mg-unit-mg tablet 1 tablet, oral, Daily    calcium citrate-vitamin D3 250 mg-5 mcg (200 unit) tablet 1 tablet, oral, 2 times daily    carvedilol (COREG) 6.25 mg, oral, 2 times daily with meals    cholecalciferol (Vitamin D-3) 125 MCG (5000 UT) capsule 1 tablet, oral, Daily    cholecalciferol (Vitamin D3) 10 MCG (400 UNIT) tablet 2 tablets, oral, Daily    cholecalciferol (Vitamin D3) 5,000 Units tablet 1 tablet (5,000 Units) once daily.    cyanocobalamin (Vitamin B-12) 1,000 mcg tablet 1 tablet, oral, Daily    DM/pseudoephed/acetaminoph/cpm (CO-APAP ORAL) APAP    docusate sodium (COLACE) 100 mg, oral, 2 times daily    fentaNYL (Duragesic) 12 mcg/hr patch     fentaNYL (Duragesic) 25 mcg/hr patch transdermal    ferrous sulfate 65 mg, oral, Daily    fexofenadine (Allegra) 180 mg tablet oral    fluticasone propionate (FLONASE NASL) As needed    Folbee Plus 5-1.5-25 mg tablet 1 tablet, oral, Daily    furosemide (LASIX) 10 mg, oral, Daily PRN    ibuprofen (MOTRIN ORAL) 4 tablets, oral, Daily PRN    lamoTRIgine (LaMICtal) 100 mg tablet 1 tablet, oral, Daily    levonorgestrel (Mirena) 21 mcg/24 hours (8 yrs) 52 mg IUD Intrauterine    levothyroxine (SYNTHROID) 200 mcg, oral, Daily, Monday - Friday, No pill on Saturdays/Sunday     levothyroxine (Synthroid, Levoxyl) 200 mcg  "tablet TAKE 1.5 TABLETS BY MOUTH ON SUNDAY AND 1 TABLET BY MOUTH MONDAY-SATURDAY    liraglutide (Victoza) 0.6 mg/0.1 mL (18 mg/3 mL) injection subcutaneous, 2 times daily    loratadine (CLARITIN) 10 mg, oral, Daily RT    loratadine (CLARITIN) 5 mg, oral, 2 times daily    LORazepam (Ativan) 0.5 mg tablet 1 tablet, oral, 3 times daily PRN    montelukast (Singulair) 10 mg tablet 1 tablet, oral, Nightly    multivitamin-min-iron-FA-vit K (Bariatric Multivitamins) 45 mg iron- 800 mcg-120 mcg capsule 2 times daily    mycophenolate (CELLCEPT) 500 mg, oral, 2 times daily    nortriptyline (Pamelor) 10 mg capsule     omeprazole (PRILOSEC) 20 mg, oral, Daily RT    omeprazole (PRILOSEC) 40 mg, oral, Daily before breakfast    ondansetron (ZOFRAN) 4 mg, oral, Every 4 hours PRN    ondansetron (ZOFRAN) 8 mg, oral, 3 times daily PRN    oxyCODONE (Roxicodone) 5 mg immediate release tablet 1 tablet, oral, Every 6 hours PRN    pantoprazole (PROTONIX) 20 mg, oral, 2 times daily    pantoprazole (PROTONIX) 40 mg, oral, 2 times daily    pembrolizumab (Keytruda) 25 mg/mL chemo injection as directed Intravenous    pen needle 1/2\" 29G X 12mm needle as directed 1 pen needle daily for injection<BR>    pen needle, diabetic (EASY COMFORT PEN NEEDLES MISC) as directed    polyethylene glycol (Miralax) 17 gram packet oral    potassium chloride CR 10 mEq ER tablet 10 mEq, oral, Daily    potassium chloride CR 20 mEq ER tablet 20 mEq, oral, 2 times daily    potassium citrate 99 mg capsule 1 tablet, oral, Daily    ramipril (ALTACE) 2.5 mg, oral, Nightly    sennosides (SENOKOT) 8.6 mg, oral, 2 times daily    sennosides-docusate sodium (Aleta-Colace) 8.6-50 mg tablet 1 tablet, oral, Nightly PRN    sertraline (Zoloft) 100 mg tablet 1 tablet, oral, Daily    sertraline 150 mg capsule 1 capsule, oral, Daily    sodium chloride (Ocean) 0.65 % nasal spray 1 spray, Each Nostril, As needed    spironolactone (ALDACTONE) 100 mg, oral, Daily    tiZANidine (Zanaflex) 2 " mg tablet     Topamax 100 mg tablet 1 tablet, oral, Daily    topiramate (Topamax) 50 mg tablet     ursodiol (ACTIGALL) 300 mg, oral, 2 times daily    Wegovy 0.5 mg, subcutaneous, Weekly    wheat dextrin (BENEFIBER SUGAR FREE, DEXTRIN, ORAL) as directed Orally    zonisamide (Zonegran) 100 mg capsule 2 capsules (200 mg) once daily at bedtime.    ZONISAMIDE ORAL oral, Nightly        Drug Allergies/Intolerances:  Allergies   Allergen Reactions    Cephalexin Anaphylaxis, Rash and Unknown    Keytruda [Pembrolizumab] Anaphylaxis    Penicillins Anaphylaxis, Rash and Unknown    Lactose Unknown    Latex Hives and Unknown    Sulfa (Sulfonamide Antibiotics) Other and Unknown    Sulfamethoxazole Unknown    Azithromycin Rash    Clarithromycin Rash and Unknown    Erythromycin Base Rash    Macrolide Antibiotics Rash        Physical Examination:  /81   Pulse 85   Temp 36.2 °C (97.2 °F)   Wt 94.8 kg (209 lb)   SpO2 99%   BMI 42.21 kg/m²      General: ambulated independently; no acute distress; well-nourished; work of breathing was not increased.  HEENT: normocephalic; anicteric sclerae; conjunctivae not injected.  Neck: supple; no lymphadenopathy or thyromegaly.  Chest: clear to auscultation bilaterally; no chest wall deformity.  Cardiac: regular rhythm; no gallop or murmur.  Abdomen: soft; non-tender; non-distended; no hepatosplenomegaly.  Extremities: trace leg edema; no digital clubbing  Psychiatric: did not appear depressed or anxious.    Laboratory Studies    Metabolic Parameters     Sodium   Date/Time Value Ref Range Status   07/21/2023 08:22  136 - 145 mmol/L Final     Potassium   Date/Time Value Ref Range Status   07/21/2023 08:22 AM 3.4 (L) 3.5 - 5.3 mmol/L Final     Chloride   Date/Time Value Ref Range Status   07/21/2023 08:22  (H) 98 - 107 mmol/L Final     Bicarbonate   Date/Time Value Ref Range Status   07/21/2023 08:22 AM 23 21 - 32 mmol/L Final     Anion Gap   Date/Time Value Ref Range Status    07/21/2023 08:22 AM 11 10 - 20 mmol/L Final     Urea Nitrogen   Date/Time Value Ref Range Status   07/21/2023 08:22 AM 14 6 - 23 mg/dL Final     Creatinine   Date/Time Value Ref Range Status   07/21/2023 08:22 AM 0.63 0.50 - 1.05 mg/dL Final     GFR Female   Date/Time Value Ref Range Status   07/21/2023 08:22 AM >90 >90 mL/min/1.73m2 Final     Comment:      CALCULATIONS OF ESTIMATED GFR ARE PERFORMED   USING THE 2021 CKD-EPI STUDY REFIT EQUATION   WITHOUT THE RACE VARIABLE FOR THE IDMS-TRACEABLE   CREATININE METHODS.    https://jasn.asnjournals.org/content/early/2021/09/22/ASN.3797045313       Glucose   Date/Time Value Ref Range Status   07/21/2023 08:22  (H) 74 - 99 mg/dL Final     Calcium   Date/Time Value Ref Range Status   07/21/2023 08:22 AM 8.8 8.6 - 10.3 mg/dL Final     Phosphorus   Date/Time Value Ref Range Status   08/27/2022 06:23 AM 3.2 2.5 - 4.9 mg/dL Final     Comment:      The performance characteristics of phosphorus testing in   heparinized plasma have been validated by the individual     laboratory site where testing is performed. Testing    on heparinized plasma is not approved by the FDA;    however, such approval is not necessary.         Hematologic Parameters     WBC   Date/Time Value Ref Range Status   07/21/2023 08:22 AM 4.9 4.4 - 11.3 x10E9/L Final     Neutrophils Absolute   Date/Time Value Ref Range Status   07/21/2023 08:22 AM 2.70 1.20 - 7.70 x10E9/L Final     Neutrophils %   Date/Time Value Ref Range Status   07/21/2023 08:22 AM 54.8 40.0 - 80.0 % Final     Lymphocytes %   Date/Time Value Ref Range Status   07/21/2023 08:22 AM 32.3 13.0 - 44.0 % Final   08/20/2021 09:38 AM 28.50 20 - 40 % Final     Monocytes %   Date/Time Value Ref Range Status   07/21/2023 08:22 AM 8.7 2.0 - 10.0 % Final   08/20/2021 09:38 AM 8.70 (H) 0 - 8 % Final     Basophils %   Date/Time Value Ref Range Status   07/21/2023 08:22 AM 1.2 0.0 - 2.0 % Final     Eosinophils Absolute   Date/Time Value Ref Range  Status   07/21/2023 08:22 AM 0.14 0.00 - 0.70 x10E9/L Final     Eosinophils %   Date/Time Value Ref Range Status   07/21/2023 08:22 AM 2.8 0.0 - 6.0 % Final     Hemoglobin   Date/Time Value Ref Range Status   07/21/2023 08:22 AM 12.1 12.0 - 16.0 g/dL Final     Hematocrit   Date/Time Value Ref Range Status   07/21/2023 08:22 AM 36.6 36.0 - 46.0 % Final     RBC   Date/Time Value Ref Range Status   07/21/2023 08:22 AM 4.54 4.00 - 5.20 x10E12/L Final     MCV   Date/Time Value Ref Range Status   07/21/2023 08:22 AM 81 80 - 100 fL Final     MCHC   Date/Time Value Ref Range Status   07/21/2023 08:22 AM 33.1 32.0 - 36.0 g/dL Final     Platelets   Date/Time Value Ref Range Status   07/21/2023 08:22  150 - 450 x10E9/L Final       Fat-Soluble Vitamin Levels     Vitamin D, 25-Hydroxy   Date/Time Value Ref Range Status   10/15/2021 01:30 PM 67 ng/mL Final     Comment:     .  DEFICIENCY:         < 20   NG/ML  INSUFFICIENCY:      20-29  NG/ML  SUFFICIENCY:         NG/ML    THIS ASSAY ACCURATELY QUANTIFIES THE SUM OF  VITAMIN D3, 25-HYDROXY AND VIT D2,25-HYDROXY.         LFT and Associated Parameters     AST   Date/Time Value Ref Range Status   07/21/2023 08:22 AM 17 9 - 39 U/L Final     ALT (SGPT)   Date/Time Value Ref Range Status   07/21/2023 08:22 AM 12 7 - 45 U/L Final     Comment:      Patients treated with Sulfasalazine may generate    falsely decreased results for ALT.       Alkaline Phosphatase   Date/Time Value Ref Range Status   07/21/2023 08:22 AM 95 33 - 110 U/L Final     Total Bilirubin   Date/Time Value Ref Range Status   07/21/2023 08:22 AM 0.5 0.0 - 1.2 mg/dL Final     Bilirubin, Direct   Date/Time Value Ref Range Status   03/02/2021 07:13 AM 0.1 0.0 - 0.3 mg/dL Final     Albumin   Date/Time Value Ref Range Status   07/21/2023 08:22 AM 4.2 3.4 - 5.0 g/dL Final   08/20/2021 09:38 AM 4.0 3.5 - 5.0 GM/DL Final     Total Protein   Date/Time Value Ref Range Status   07/21/2023 08:22 AM 7.0 6.4 - 8.2 g/dL  Final       Coagulation Parameters     Protime   Date/Time Value Ref Range Status   08/17/2022 02:46 PM 15.3 (H) 9.8 - 13.4 sec Final     INR   Date/Time Value Ref Range Status   08/17/2022 02:46 PM 1.3 (H) 0.9 - 1.1 Final       Diabetes Laboratory Tests     Hemoglobin A1C   Date/Time Value Ref Range Status   11/29/2022 12:24 PM 4.9 % Final     Comment:          Diagnosis of Diabetes-Adults   Non-Diabetic: < or = 5.6%   Increased risk for developing diabetes: 5.7-6.4%   Diagnostic of diabetes: > or = 6.5%  .       Monitoring of Diabetes                Age (y)     Therapeutic Goal (%)   Adults:          >18           <7.0   Pediatrics:    13-18           <7.5                   7-12           <8.0                   0- 6            7.5-8.5   American Diabetes Association. Diabetes Care 33(S1), Jan 2010.          Immunology Laboratory Tests     Total IgG   Date/Time Value Ref Range Status   10/28/2022 02:50  700 - 1,600 mg/dL Final     Comment:     MONOCLONAL PROTEINS MAY CAUSE FALSELY LOW  RESULTS IN THIS ASSAY. SERUM PROTEIN  ELECTROPHORESIS SHOULD BE DONE AS THE  FIRST TEST TO EVALUATE MONOCLONAL GAMMOPATHY.       IgA   Date/Time Value Ref Range Status   02/28/2021 09:01 AM 83 70 - 400 mg/dL Final     Comment:     MONOCLONAL PROTEINS MAY CAUSE FALSELY LOW  RESULTS IN THIS ASSAY. SERUM PROTEIN  ELECTROPHORESIS SHOULD BE DONE AS THE  FIRST TEST TO EVALUATE MONOCLONAL GAMMOPATHY.       IgM   Date/Time Value Ref Range Status   02/28/2021 09:01  40 - 230 mg/dL Final     Comment:     MONOCLONAL PROTEINS MAY CAUSE FALSELY LOW  RESULTS IN THIS ASSAY. SERUM PROTEIN  ELECTROPHORESIS SHOULD BE DONE AS THE  FIRST TEST TO EVALUATE MONOCLONAL GAMMOPATHY.           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    With negative BALs, cultures and cytology, felt new areas of GGOs on 10/11/22 CT truly reflected organizing pneumonia so pt started on MMF with hopes of weaning off prednisone. As pt is tolerating therapeutic doses of MMF with no side effects and 4/7/23 CT Chest with near resolution of bilateral GGOs, she was tapered off prednisone. She notice more SOB since she reduced her Cellcept dose (she has to reduce it as she doesn't have refill & she almost ran out).      Chronic Hypoxemic Respiratory Failure 2/2 organizing pneumonia  -resume MMF but at lower dose 500mg BID  -CBC & CMP while on cellcept  -CT chest, 6MWT, full PFTs    Follow up in 1 month    Discussed with Dr. Arevalo    Problem List Items Addressed This Visit       Drug-induced pneumonitis    Relevant Medications    mycophenolate (Cellcept) 250 mg capsule    Other Relevant Orders    Follow Up In Pulmonology    CT chest wo IV contrast    CBC and Auto Differential    Comprehensive metabolic panel    Organizing pneumonia (CMS/HCC) - Primary    Relevant Medications    mycophenolate (Cellcept) 250 mg capsule    Other Relevant Orders    Follow Up In Pulmonology    CT chest wo IV contrast    CBC and Auto Differential    Comprehensive metabolic panel     Other Visit Diagnoses       Dyspnea on exertion        Relevant Orders    Pulmonary function testing              Follow-up: Visit date not found     Lazaro Titus MD   10/02/2023

## 2023-10-06 ENCOUNTER — TELEMEDICINE (OUTPATIENT)
Dept: PALLIATIVE MEDICINE | Facility: HOSPITAL | Age: 43
End: 2023-10-06
Payer: COMMERCIAL

## 2023-10-06 DIAGNOSIS — G89.3 NEOPLASM RELATED PAIN: ICD-10-CM

## 2023-10-06 DIAGNOSIS — Z51.5 ENCOUNTER FOR PALLIATIVE CARE: ICD-10-CM

## 2023-10-06 DIAGNOSIS — F41.9 ANXIETY: ICD-10-CM

## 2023-10-06 PROCEDURE — 99214 OFFICE O/P EST MOD 30 MIN: CPT | Performed by: NURSE PRACTITIONER

## 2023-10-06 PROCEDURE — 99214 OFFICE O/P EST MOD 30 MIN: CPT | Mod: 95 | Performed by: NURSE PRACTITIONER

## 2023-10-06 RX ORDER — LORAZEPAM 0.5 MG/1
0.5 TABLET ORAL 3 TIMES DAILY PRN
Qty: 90 TABLET | Refills: 2 | Status: SHIPPED | OUTPATIENT
Start: 2023-10-06 | End: 2024-02-02 | Stop reason: SDUPTHER

## 2023-10-06 RX ORDER — FENTANYL 12.5 UG/1
1 PATCH TRANSDERMAL
Qty: 10 PATCH | Refills: 0 | Status: SHIPPED | OUTPATIENT
Start: 2023-10-06 | End: 2023-11-05

## 2023-10-06 RX ORDER — OXYCODONE HYDROCHLORIDE 5 MG/1
5 TABLET ORAL EVERY 6 HOURS PRN
Qty: 120 TABLET | Refills: 0 | Status: SHIPPED | OUTPATIENT
Start: 2023-10-06 | End: 2023-11-05

## 2023-10-06 RX ORDER — TIZANIDINE 2 MG/1
2 TABLET ORAL EVERY 6 HOURS PRN
Qty: 120 TABLET | Refills: 2 | Status: SHIPPED | OUTPATIENT
Start: 2023-10-06 | End: 2024-01-04

## 2023-10-06 NOTE — PROGRESS NOTES
SUPPORTIVE AND PALLIATIVE ONCOLOGY OUTPATIENT FOLLOW-UP    Virtual or Telephone Consent    An interactive audio and video telecommunication system which permits real time communications between the patient (at the originating site) and provider (at the distant site) was utilized to provide this telehealth service.   Verbal consent was requested and obtained from Isaias Chamorro on this date, 10/06/23 for a telehealth visit.       SERVICE DATE: 10/6/2023    Subjective   HISTORY OF PRESENT ILLNESS: Isaias Chamorro is a 43 y.o. female who presents with past medical history of Hodgkin's Lymphoma, originally diagnosed June 2013, with recent relapse in March of 2021.  She received RT therapy to paraspinal mass, was pursuing Pembrolizumab but developed pneumonitis. Currently on surveillance.   Patient follows with Supportive Oncology for pain and further symptom management.    Pain Assessment:  Pain Score:  5  Location:  back    Symptom Assessment:  Pain:a little-continues chronic back pain and neuropathic pain  Headache: none  Dizziness:none  Lack of energy: none  Difficulty sleeping: none  Worrying: none  Anxiety: a little  Depression: a little - started seeing a Trauma Therapist that she has found to be very helpful, also started to pursue acupuncture which she feels is helping with both her mood as well as pain  Pain in mouth/swallowing: none  Dry mouth: none  Taste changes: none  Shortness of breath: none  Lack of appetite: none   Nausea: none  Vomiting: none  Constipation: none  Diarrhea: none  Sore muscles: a little - in neck and shoulders  Numbness or tingling in hands/feet/other: a little  Weight loss: none  Other: none  Information obtained from: interview of patient  ______________________________________________________________________        Objective      PHYSICAL EXAMINATION   Vital Signs:   Not complete dt virtual visit     Physical Exam  Not complete dt virtual  visit    ASSESSMENT/PLAN    Pain  Pain is: chronic - post cancer therapy  Type: somatic and neuropathic  Pain control: well-controlled  Home regimen:   - Continue Fentanyl Patch 12.5mcg/hr, changing every 72 hours  - Continue Oxycodone 2.5-5mg q4-6hours PRN  - Continue Tizanidine 2mg 1-2x per day for muscle spasms  - Continue Topiramate 100mg TID  - Consider Methadone - discussed today possibly rotating, patient would like to think about this  - Following with Dr Beebe - pursuing acupuncture  Intolerances/previously tried:   - Unable to take NSAIDs d/t bariatric surgery and Abreu's Esophagus  - Gabapentin - did not tolerate - excessive weight gain  - Pregabalin - did not tolerate  - Duloxetine - did not tolerate in past  - D/C Nortriptyline 10mg once daily at bed due to concerns of serotonin syndrome     Opioid Use  Medication Management:   - OARRS report reviewed with no aberrant behavior; consistent with  prescriptions/records and patient history  - MED 45.  Overdose Risk Score 200.   This has been discussed with patient.   - We will continue to closely monitor the patient for signs of prescription misuse including UDS, OARRS review and subjective reports at each visit.  - Concurrent benzodiazepine use with lorazepam, educated on risks.  - I am a provider who either is or has consulted and collaborated with a provider certified in Hospice and Palliative Medicine and have conducted a face-face visit and examination for this patient.  - Routine Urine Drug Screen: 4/21/23 appropriately + for prescribed medications and - for illicits  - Controlled Substance Agreement: 4/21/23  - Specifically discussed that controlled substance prescriptions will only be provided by our group as outlined in the completed agreement  - Prescribed naloxone: 5/6/22  - Red Flags: none    Nausea (improved)  - Continue Ondansetron 8mg [id number] PRN  - Continue Lorazepam 0.5mg, 1/2-1 tab q8 hours PRN    Constipation (Chronic in nature)  At  risk for constipation related to opioids.  Home regimen:   - Following with Gastro Dr. Borrero  - Continue Linzess as prescribed  - Continue Senna-S PRN  - Continue Milk of Magnesia 24% Concentrate - 10mL once daily PRN for severe constipation  - Encouraged trying Bisacoydl suppository in addition for further relief      Altered Mood  Chronic anxiety and depression related to  hx of bipolar disorder .  Home regimen:   - Managed by outside provider - appreciate recs  - Following with trauma counselor  - Pursuing acupuncture  - Continue Lamictal 75mg daily  - Continue Sertraline 150mg daily  - Continue Lorazepam 0.5mg, 1/2 to 1 tablet [id number] PRN for anxiety  - Continue Abilify as prescribed by psychiatrist    Next Follow-Up Visit:  Return to clinic in 6 weeks virtually    Signature and billing  Medical complexity was moderate level due to due to complexity of problems, extensive data review, and high risk of management/treatment.  Time was spent on the following: Prep Time, Time Directly with Patient/Family/Caregiver, Documentation Time. Total time spent: 35      Data    Some elements copied from Support[facility] Oncology note on 7/6/23, the elements have been updated and all reflect current decision making from today, 10/6/2023.      Plan of Care discussed with: Patient    SIGNATURE: MORRO Maldonado-CNP    Contact information:  [facility]  Monday-Friday 8 AM-5 PM  Phone:  [phone], press option #5, then option #1.   Or Epic Secure Chat

## 2023-10-11 ENCOUNTER — APPOINTMENT (OUTPATIENT)
Dept: INTEGRATIVE MEDICINE | Facility: CLINIC | Age: 43
End: 2023-10-11
Payer: COMMERCIAL

## 2023-10-16 ENCOUNTER — TELEPHONE (OUTPATIENT)
Dept: PULMONOLOGY | Facility: HOSPITAL | Age: 43
End: 2023-10-16
Payer: COMMERCIAL

## 2023-10-16 NOTE — TELEPHONE ENCOUNTER
Faxed the completed disability paperwork along with medical records since 1/2023 to [employer].  Fax confirmation received.

## 2023-10-18 ENCOUNTER — ALLIED HEALTH (OUTPATIENT)
Dept: INTEGRATIVE MEDICINE | Facility: CLINIC | Age: 43
End: 2023-10-18
Payer: MEDICARE

## 2023-10-18 DIAGNOSIS — M54.2 CHRONIC CERVICAL PAIN: Primary | ICD-10-CM

## 2023-10-18 DIAGNOSIS — Z94.81 STATUS POST BONE MARROW TRANSPLANT (MULTI): ICD-10-CM

## 2023-10-18 DIAGNOSIS — G62.9 POLYNEUROPATHY: ICD-10-CM

## 2023-10-18 DIAGNOSIS — G89.29 CHRONIC CERVICAL PAIN: Primary | ICD-10-CM

## 2023-10-18 DIAGNOSIS — F41.9 ANXIETY AND DEPRESSION: ICD-10-CM

## 2023-10-18 DIAGNOSIS — F41.9 ANXIETY: ICD-10-CM

## 2023-10-18 DIAGNOSIS — C81.90 HODGKIN LYMPHOMA, UNSPECIFIED HODGKIN LYMPHOMA TYPE, UNSPECIFIED BODY REGION (MULTI): ICD-10-CM

## 2023-10-18 DIAGNOSIS — F32.A ANXIETY AND DEPRESSION: ICD-10-CM

## 2023-10-18 PROCEDURE — 99213 OFFICE O/P EST LOW 20 MIN: CPT | Performed by: HOSPITALIST

## 2023-10-18 ASSESSMENT — PAIN SCALES - GENERAL: PAINLEVEL_OUTOF10: 6

## 2023-10-18 NOTE — PROGRESS NOTES
"Acupuncture Visit:     Subjective   Patient ID: Isaias Chamorro is a 43 y.o. female who presents for No chief complaint on file.  HPI    Back on Abilify  Back on Cellcept, feeling side effects  Short term memory loss,   Pain is worse, fentayl is not helping  Way try methadone  Neuropathy still present            Initial visit:  Pain  Has constant \"slicing\" sensation in scapula,   uses fentanyl patch due to gastric bypass hx  not always getting full pain coverage  uses oxy 5 occ  down to 12 on path, was having too much fatigue and brain fog on higher dose        pain in brainstem, feels like gaurav being shoved into brain  meds do not help  migraines a few times a week.   clicking popping in jaw on left, TMJ guard does not help.   pain can be worse laying down, may sit up to sleep.   topomax helping with nerve pain in left arm  gets massage 1x mo, very tight, , pain left levator scap.   no pain on right side,      occ forearm strain in elbow from matty     headache. extends from occiput to frontal   seasonal allergies- sudafed, singular, allegra           Stress/anxiety  Depressed for many years , only recognized as an adult  Psych dx as adjustment disorder  seasonal it is worse  mom is moving away  father passed 2 years ago from cancer, initially dx around he same time.   has never processed the emotions well  added meditation  no exercise  low motivation.      Supps-  B complex  Vit d- 5000IU  Biotin  Fiber  pre/probiotic  ashwgandha  threonine  megnesium  calcium  B12     Diet:   B: yogurt, cottage cheese, triscuts,   L: may skip, not hungry,   S: nutragrain bar,   D: burger, cheese, pb, occ fast food.   has not tried any greens powders  Dr: coffee, sugar free strawberry lemonade,      BM: was on weight loss meds then was on 50mg prednisone for 10 months caused weight gain.            Results/Data  WellbeingPre 4  CopingPre 5  PainPre 6  TirednessPre 5  AnxietyPre 5  DepressionPre 7  StressPre " 8  NauseaPre 0     WellbeingPost 3  CopingPost 5  PainPost 5  TirednessPost 5  AnxietyPost 5  DepressionPost 6  StressPost 7  NauseaPost 0            Review of Systems         Provider reviewed plan for the acupuncture session, precautions and contraindications. Patient/guardian/hospital staff has given consent to treat with full understanding of what to expect during the session. Before acupuncture began, provider explained to the patient to communicate at any time if the procedure was causing discomfort past their tolerance level. Patient agreed to advise acupuncturist. The acupuncturist counseled the patient on the risks of acupuncture treatment including pain, infection, bleeding, and no relief of pain. The patient was positioned comfortably. There was no evidence of infection at the site of needle insertions.    Objective   Physical Exam       Points: Scalp Neck, BFA 1-3, GB20, LI4, SI3, HT7, SP6, Bhupendra ortega,bhupendra kemp, LR3  Heat lamp: feet           Needles in/out: 23                         Assessment/Plan

## 2023-10-18 NOTE — PROGRESS NOTES
Patient ID: Isaias Chamorro is a 43 y.o. female.  Referring Physician: No referring provider defined for this encounter.  Primary Care Provider: MORRO Jacobson-CNP    CANCER HISTORY:   41 yo woman with h/o Stage IIb HL, 6/13  - s/p ABVD x6 cycles with persistent disease noted in November 2013      BR:   - Enrolled on clinical trial [id number] with bendamustine and rituximab for 2   cycles   - PET-CT in March 2014 noted a CR      aSCT:   - s/p stem cell mobilization and collection: collected 3.55 x10(6) CD34 cells   per kg.   - s/p aSCT with BEAM preparative regimen on April 15, 2014. Hospitalization   complicated by neutropenic fever, pneumonia, menstrual bleeding, abdominal   pain, nausea, vomiting, diarrhea, transaminitis secondary to medications, and   electrolyte abnormalities.      Maintenance:   - s/p maintenance Brentuximab per clinical research trial [id number], given on   day 1 of each 21 day cycle. Completed 16 cycles with CR by PET.      Relapse:   - She had imaging in July 2015 that suggested a possible recurrence. This was   histologically confirmed by resection of a mediastinal mass by Dr. Oreilly.      Radiation:   - She began radiation therapy on 9/3/15 which completed on 10/6/15      Relapse:   - Admitted to [facility] (2/26/21) for weakness in left upper extremity. MRI   (2/23/21) demonstrated a tumor in the left paraspinous region and brachial   plexus partially filling the C7-T1 neural foramen (no cord compression), and   enlarged left supraclavicular nodes. Ortho/Spine consulted on admit, no   surgical intervention needed. PET scan (3/1/21) demonstrated left paraspinal   mass, left supraclavicular node, several cervical LN. Patient underwent a core   biopsy x 3 of the left supraclavicular node, confirmed Classical Hodgkin   Lymphoma relapse. Patient was started on Prednisone 50mg daily      Pembro:   - Salvage pembrolizumab given 3/23, 4/13, 5/4, 5/25, 6/18, 7/9/2021   - EOT PET showed  Deauville 1 response. Discussed in tumor board on 8/19/21,   reviewed with rad onc; appears that her relapse overlapped some of her prior   radiation ports. Recommended consideration of consolidative radiation.      Radiation:   - 19 fractions planned to recurrence site started in early Oct, 2021   (10/19/21-11/11/21)   - EOT PET 11/29/21 - deauville 1      Maintenance:   - Pembro q3w beginning 2/11/22. Additional doses 3/4/22, 3/25/22, 5/6/22,   5/27/22, 6/17/22, 7/8/22.   - C/b interstitial pneumonitis (8/2022) requiring O2 supplementation, extended   prednisone taper      Other PMH:   - History of protein S deficiency and splenic infarct in 2011 with history of   anticoagulation prior to transplant. It has been determined that this is likely   due to an acute illness and inflammation at the time of her protein S   deficiency diagnosis and she currently does not require anticoagulation.   - History of monoclonal IgG lambda gammopathy.   - Hypothyroidism.   - Hypertension.   - Depression and bipolar disorder.   - Restless leg syndrome.   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 Breathing improved     Sleep: melatonin doesn't help, some meditation - sleeping more reg  sleeps in clusters of 3 hrs/time     Stress: struggles with anxiety, can get overwhelmed sometimes  mother moving away causing anxiety  Management: on lorazepam prn     Natural Products:      ROS:  no ha, visual symptoms, hearing loss  no sob, chest pain, palp  ROS o/w non contributory, please see HPI    Objective    BSA: There is no height or weight on file to calculate BSA.  There were no vitals taken for this visit.    PHYSICAL EXAM:  NAD, awake/alert  HEENT, NCAT, OP clear, no oral lesions  CTA bilat  RRR no mgr  Abd soft/nt/nd+bs  No c/c/e/ttp  Motor/sensory intact, CN 2-12 intact     RESULTS:  Lab Results   Component Value Date    WBC 4.9 07/21/2023    HGB 12.1 07/21/2023    HCT 36.6 07/21/2023     07/21/2023     04/21/2023    CREATININE 0.63 07/21/2023    AST 17 07/21/2023       Assessment/Plan   Cancer Staging   No matching staging information was found for the patient.    CANCER SPECIFIC RECCS:  43 yo woman with h/o Stage IIb HL, 6/13  - s/p ABVD x6 cycles with persistent disease noted in November 2013   s/p auto txp, clinical trial with brentuximab/bendamustine  pembro - c/b immune pneumonitis  exacc by radiation     Pain - acupuncture  cont supp onc care incl fentanyl patch  May consider switching methadone    Neuropathic pain - was on topomax     Stress management/anxiety:  Massage in future - symptom management  Reiki   Meditation - focus on regularity  Cont water exercises  F/u psych - consulted     Wt gain - related to steroids  s/p gastric bypass  probiotics  dietitian consultation     F/u Symptom management clinic  Integrative Oncology 2 mo     SYMPTOM MANAGEMENT:  Integrative Oncology Symptom Management:     [facility] Integrative Oncology Symptom Management clinic offers multi-disciplinary supervised care of cancer patients  using Integrative Modalities billed to insurance using NCCN and SIO/ASCO guideline-driven practices.  ESAS is obtained prior to and after each treatment by the practitioner     Symptoms Managed:  stress/anxiety - was very emotional after last acupuncture  Mom moving away causing her to be upset  Improved and dealing better with it     Pain - muscle tension causing pain, did improve from last visit with treatment  made her emotional  Considering methadone    Neuropathy - on topomax     Natural Products utilized:     Integrative Treatment: Acupuncture  Session #: 3  Frequency: weekly     Referrals: trauma therapist starting  Recommendations:     Follow Up:  Symptom Management: weekly  Integrative Oncology: 2 mo     I have personally seen the patient and supervised the treatment by the integrative practitioner during this visit.  Pt had symptoms discussed and I was present for the patient's 45 minutes of direct patient care.    Ty Beebe MD

## 2023-10-24 ENCOUNTER — TELEPHONE (OUTPATIENT)
Dept: HEMATOLOGY/ONCOLOGY | Facility: HOSPITAL | Age: 43
End: 2023-10-24
Payer: COMMERCIAL

## 2023-10-24 ENCOUNTER — APPOINTMENT (OUTPATIENT)
Dept: HEMATOLOGY/ONCOLOGY | Facility: HOSPITAL | Age: 43
End: 2023-10-24
Payer: COMMERCIAL

## 2023-10-24 NOTE — TELEPHONE ENCOUNTER
Called and left message for Isaias regarding missed appointment today, asked that she call back to reschedule.

## 2023-10-25 ENCOUNTER — ALLIED HEALTH (OUTPATIENT)
Dept: INTEGRATIVE MEDICINE | Facility: CLINIC | Age: 43
End: 2023-10-25
Payer: COMMERCIAL

## 2023-10-25 DIAGNOSIS — G62.9 POLYNEUROPATHY: ICD-10-CM

## 2023-10-25 DIAGNOSIS — G89.29 CHRONIC CERVICAL PAIN: Primary | ICD-10-CM

## 2023-10-25 DIAGNOSIS — Z94.81 STATUS POST BONE MARROW TRANSPLANT (MULTI): ICD-10-CM

## 2023-10-25 DIAGNOSIS — F32.A ANXIETY AND DEPRESSION: ICD-10-CM

## 2023-10-25 DIAGNOSIS — C81.90 HODGKIN LYMPHOMA, UNSPECIFIED HODGKIN LYMPHOMA TYPE, UNSPECIFIED BODY REGION (MULTI): ICD-10-CM

## 2023-10-25 DIAGNOSIS — M54.2 CHRONIC CERVICAL PAIN: Primary | ICD-10-CM

## 2023-10-25 DIAGNOSIS — F41.9 ANXIETY AND DEPRESSION: ICD-10-CM

## 2023-10-25 DIAGNOSIS — F41.9 ANXIETY: ICD-10-CM

## 2023-10-25 PROCEDURE — 99213 OFFICE O/P EST LOW 20 MIN: CPT | Performed by: HOSPITALIST

## 2023-10-25 ASSESSMENT — PAIN SCALES - GENERAL: PAINLEVEL_OUTOF10: 6

## 2023-10-25 NOTE — PROGRESS NOTES
"Acupuncture Visit:     Subjective   Patient ID: Isaias Chamorro is a 43 y.o. female who presents for No chief complaint on file.  HPI    Is still fatiguied,   Still significant pain, trying fentanyl for longer,  may switch to methadone  trying to keep up at gym  Stress level is reduced.   Depression 50% improved, sees psych thurs.  Possible med adjustment. Enjoying therapy sessions      Back on Abilify  Back on Cellcept, feeling side effects  Short term memory loss,   Pain is worse, fentayl is not helping  Way try methadone  Neuropathy still present            Initial visit:  Pain  Has constant \"slicing\" sensation in scapula,   uses fentanyl patch due to gastric bypass hx  not always getting full pain coverage  uses oxy 5 occ  down to 12 on path, was having too much fatigue and brain fog on higher dose        pain in brainstem, feels like gaurav being shoved into brain  meds do not help  migraines a few times a week.   clicking popping in jaw on left, TMJ guard does not help.   pain can be worse laying down, may sit up to sleep.   topomax helping with nerve pain in left arm  gets massage 1x mo, very tight, , pain left levator scap.   no pain on right side,      occ forearm strain in elbow from matty     headache. extends from occiput to frontal   seasonal allergies- sudafed, singular, allegra           Stress/anxiety  Depressed for many years , only recognized as an adult  Psych dx as adjustment disorder  seasonal it is worse  mom is moving away  father passed 2 years ago from cancer, initially dx around he same time.   has never processed the emotions well  added meditation  no exercise  low motivation.      Supps-  B complex  Vit d- 5000IU  Biotin  Fiber  pre/probiotic  ashwgandha  threonine  megnesium  calcium  B12     Diet:   B: yogurt, cottage cheese, triscuts,   L: may skip, not hungry,   S: nutragrain bar,   D: burger, cheese, pb, occ fast food.   has not tried any greens powders  Dr: coffee, sugar " free strawberry lemonade,      BM: was on weight loss meds then was on 50mg prednisone for 10 months caused weight gain.                        Review of Systems         Provider reviewed plan for the acupuncture session, precautions and contraindications. Patient/guardian/hospital staff has given consent to treat with full understanding of what to expect during the session. Before acupuncture began, provider explained to the patient to communicate at any time if the procedure was causing discomfort past their tolerance level. Patient agreed to advise acupuncturist. The acupuncturist counseled the patient on the risks of acupuncture treatment including pain, infection, bleeding, and no relief of pain. The patient was positioned comfortably. There was no evidence of infection at the site of needle insertions.    Objective   Physical Exam       Points: Scalp Neck, BFA 1-3, GB20, LI4, SI3, HT7, SP6, Bhupendra ortega,bhupendra kemp, LR3  Heat lamp: feet           Needles in/out: 23                         Assessment/Plan

## 2023-10-25 NOTE — PROGRESS NOTES
Patient ID: Isaias Chamorro is a 43 y.o. female.  Referring Physician: No referring provider defined for this encounter.  Primary Care Provider: MORRO Jacobson-CNP    CANCER HISTORY:   41 yo woman with h/o Stage IIb HL, 6/13  - s/p ABVD x6 cycles with persistent disease noted in November 2013      BR:   - Enrolled on clinical trial [id number]-1412 with bendamustine and rituximab for 2   cycles   - PET-CT in March 2014 noted a CR      aSCT:   - s/p stem cell mobilization and collection: collected 3.55 x10(6) CD34 cells   per kg.   - s/p aSCT with BEAM preparative regimen on April 15, 2014. Hospitalization   complicated by neutropenic fever, pneumonia, menstrual bleeding, abdominal   pain, nausea, vomiting, diarrhea, transaminitis secondary to medications, and   electrolyte abnormalities.      Maintenance:   - s/p maintenance Brentuximab per clinical research trial [id number], given on   day 1 of each 21 day cycle. Completed 16 cycles with CR by PET.      Relapse:   - She had imaging in July 2015 that suggested a possible recurrence. This was   histologically confirmed by resection of a mediastinal mass by Dr. Oreilly.      Radiation:   - She began radiation therapy on 9/3/15 which completed on 10/6/15      Relapse:   - Admitted to [facility] (2/26/21) for weakness in left upper extremity. MRI   (2/23/21) demonstrated a tumor in the left paraspinous region and brachial   plexus partially filling the C7-T1 neural foramen (no cord compression), and   enlarged left supraclavicular nodes. Ortho/Spine consulted on admit, no   surgical intervention needed. PET scan (3/1/21) demonstrated left paraspinal   mass, left supraclavicular node, several cervical LN. Patient underwent a core   biopsy x 3 of the left supraclavicular node, confirmed Classical Hodgkin   Lymphoma relapse. Patient was started on Prednisone 50mg daily      Pembro:   - Salvage pembrolizumab given 3/23, 4/13, 5/4, 5/25, 6/18, 7/9/2021   - EOT PET showed  Deauville 1 response. Discussed in tumor board on 8/19/21,   reviewed with rad onc; appears that her relapse overlapped some of her prior   radiation ports. Recommended consideration of consolidative radiation.      Radiation  - 19 fractions planned to recurrence site started in early Oct, 2021   (10/19/21-11/11/21)   - EOT PET 11/29/21 - deauville 1      Maintenance:   - Pembro q3w beginning 2/11/22. Additional doses 3/4/22, 3/25/22, 5/6/22,   5/27/22, 6/17/22, 7/8/22.   - C/b interstitial pneumonitis (8/2022) requiring O2 supplementation, extended   prednisone taper      Other PMH:   - History of protein S deficiency and splenic infarct in 2011 with history of   anticoagulation prior to transplant. It has been determined that this is likely   due to an acute illness and inflammation at the time of her protein S   deficiency diagnosis and she currently does not require anticoagulation.   - History of monoclonal IgG lambda gammopathy.   - Hypothyroidism.   - Hypertension.   - Depression and bipolar disorder.   - Restless leg syndrome.   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 Breathing improved     Sleep: melatonin doesn't help, some meditation - sleeping more reg  sleeps in clusters of 3 hrs/time     Stress: struggles with anxiety, can get overwhelmed sometimes  mother moving away causing anxiety  Management: on lorazepam prn     Natural Products:        ROS:  no ha, visual symptoms, hearing loss  no sob, chest pain, palp  ROS o/w non contributory, please see HPI    Objective    BSA: There is no height or weight on file to calculate BSA.  There were no vitals taken for this visit.    PHYSICAL EXAM:  NAD, awake/alert  HEENT, NCAT, OP clear, no oral lesions  CTA bilat  RRR no mgr  Abd soft/nt/nd+bs  No c/c/e/ttp  Motor/sensory intact, CN 2-12 intact     RESULTS:  Lab Results   Component Value Date    WBC 4.9 07/21/2023    HGB 12.1 07/21/2023    HCT 36.6 07/21/2023     07/21/2023     04/21/2023    CREATININE 0.63 07/21/2023    AST 17 07/21/2023       Assessment/Plan   41 yo woman with h/o Stage IIb HL, 6/13  - s/p ABVD x6 cycles with persistent disease noted in November 2013   s/p auto txp, clinical trial with brentuximab/bendamustine  pembro - c/b immune pneumonitis  exacc by radiation     Pain - acupuncture  cont supp onc care incl fentanyl patch  May consider switching methadone     Neuropathic pain - was on topomax     Stress management/anxiety:  Massage in future - symptom management  Reiki   Meditation - focus on regularity  Cont water exercises  F/u psych - consulted     Wt gain - related to steroids  s/p gastric bypass  probiotics  dietitian consultation     F/u Symptom management clinic  Integrative Oncology 2 mo      SYMPTOM MANAGEMENT:  Integrative Oncology Symptom Management:     The [facility] Integrative Oncology Symptom Management clinic offers multi-disciplinary supervised care of cancer patients using Integrative Modalities billed to insurance using NCCN and SIO/ASCO guideline-driven  practices.  ESAS is obtained prior to and after each treatment by the practitioner     Symptoms Managed:  stress/anxiety - was very emotional after last acupuncture  Mom moving away causing her to be upset  Improved and dealing better with it  Depression - on abilify and 50% improved     Pain - muscle tension causing pain, did improve from last visit with treatment  made her emotional  Considering methadone.  Still poorly controlled, f/b palliative care     Neuropathy - on topomax     Natural Products utilized:     Integrative Treatment: Acupuncture  Session #: 4  Frequency: weekly     Referrals: trauma therapist starting  Recommendations:     Follow Up:  Symptom Management: weekly  Integrative Oncology: 2 mo     I have personally seen the patient and supervised the treatment by the integrative practitioner during this visit.  Pt had symptoms discussed and I was present for the patient's 45 minutes of direct patient care.    Ty Beebe MD

## 2023-11-01 ENCOUNTER — APPOINTMENT (OUTPATIENT)
Dept: INTEGRATIVE MEDICINE | Facility: CLINIC | Age: 43
End: 2023-11-01
Payer: COMMERCIAL

## 2023-11-02 ENCOUNTER — HOSPITAL ENCOUNTER (OUTPATIENT)
Dept: RADIOLOGY | Facility: HOSPITAL | Age: 43
Discharge: HOME | End: 2023-11-02
Payer: COMMERCIAL

## 2023-11-02 DIAGNOSIS — J84.89 ORGANIZING PNEUMONIA (MULTI): ICD-10-CM

## 2023-11-02 DIAGNOSIS — J98.4 DRUG-INDUCED PNEUMONITIS: ICD-10-CM

## 2023-11-02 DIAGNOSIS — T50.905A DRUG-INDUCED PNEUMONITIS: ICD-10-CM

## 2023-11-02 PROCEDURE — 71250 CT THORAX DX C-: CPT | Performed by: RADIOLOGY

## 2023-11-02 PROCEDURE — 71250 CT THORAX DX C-: CPT

## 2023-11-06 ENCOUNTER — TELEMEDICINE (OUTPATIENT)
Dept: INTEGRATIVE MEDICINE | Facility: CLINIC | Age: 43
End: 2023-11-06
Payer: MEDICARE

## 2023-11-06 DIAGNOSIS — Z94.81 STATUS POST BONE MARROW TRANSPLANT (MULTI): ICD-10-CM

## 2023-11-06 DIAGNOSIS — F32.A ANXIETY AND DEPRESSION: ICD-10-CM

## 2023-11-06 DIAGNOSIS — G89.29 CHRONIC CERVICAL PAIN: ICD-10-CM

## 2023-11-06 DIAGNOSIS — F41.9 ANXIETY: ICD-10-CM

## 2023-11-06 DIAGNOSIS — C81.90 HODGKIN LYMPHOMA, UNSPECIFIED HODGKIN LYMPHOMA TYPE, UNSPECIFIED BODY REGION (MULTI): Primary | ICD-10-CM

## 2023-11-06 DIAGNOSIS — F41.9 ANXIETY AND DEPRESSION: ICD-10-CM

## 2023-11-06 DIAGNOSIS — G62.9 POLYNEUROPATHY: ICD-10-CM

## 2023-11-06 DIAGNOSIS — M54.2 CHRONIC CERVICAL PAIN: ICD-10-CM

## 2023-11-06 PROCEDURE — 99214 OFFICE O/P EST MOD 30 MIN: CPT | Performed by: HOSPITALIST

## 2023-11-06 NOTE — PROGRESS NOTES
Patient ID: Isaias Chamorro is a 43 y.o. female.  Referring Physician: No referring provider defined for this encounter.  Primary Care Provider: SAVANAH Jacobson    An interactive audio and video telecommunication system which permits real time communications between the patient (at the originating site) and provider (at the distant site) was utilized to provide this telehealth service.    Verbal consent was requested and obtained on this date for a telehealth visit.     CANCER HISTORY:   43 yo woman with h/o Stage IIb HL, 6/13  - s/p ABVD x6 cycles with persistent disease noted in November 2013      BR:   - Enrolled on clinical trial SEGE-1412 with bendamustine and rituximab for 2   cycles   - PET-CT in March 2014 noted a CR      aSCT:   - s/p stem cell mobilization and collection: collected 3.55 x10(6) CD34 cells   per kg.   - s/p aSCT with BEAM preparative regimen on April 15, 2014. Hospitalization   complicated by neutropenic fever, pneumonia, menstrual bleeding, abdominal   pain, nausea, vomiting, diarrhea, transaminitis secondary to medications, and   electrolyte abnormalities.      Maintenance:   - s/p maintenance Brentuximab per clinical research trial [id number], given on   day 1 of each 21 day cycle. Completed 16 cycles with CR by PET.      Relapse:   - She had imaging in July 2015 that suggested a possible recurrence. This was   histologically confirmed by resection of a mediastinal mass by Dr. Oreilly.      Radiation:   - She began radiation therapy on 9/3/15 which completed on 10/6/15      Relapse:   - Admitted to [facility] (2/26/21) for weakness in left upper extremity. MRI   (2/23/21) demonstrated a tumor in the left paraspinous region and brachial   plexus partially filling the C7-T1 neural foramen (no cord compression), and   enlarged left supraclavicular nodes. Ortho/Spine consulted on admit, no   surgical intervention needed. PET scan (3/1/21) demonstrated left paraspinal   mass, left  supraclavicular node, several cervical LN. Patient underwent a core   biopsy x 3 of the left supraclavicular node, confirmed Classical Hodgkin   Lymphoma relapse. Patient was started on Prednisone 50mg daily      Pembro:   - Salvage pembrolizumab given 3/23, 4/13, 5/4, 5/25, 6/18, 7/9/2021   - EOT PET showed Deauville 1 response. Discussed in tumor board on 8/19/21,   reviewed with rad onc; appears that her relapse overlapped some of her prior   radiation ports. Recommended consideration of consolidative radiation.      Radiation  - 19 fractions planned to recurrence site started in early Oct, 2021   (10/19/21-11/11/21)   - EOT PET 11/29/21 - deauville 1      Maintenance:   - Pembro q3w beginning 2/11/22. Additional doses 3/4/22, 3/25/22, 5/6/22,   5/27/22, 6/17/22, 7/8/22.   - C/b interstitial pneumonitis (8/2022) requiring O2 supplementation, extended   prednisone taper      Other PMH:   - History of protein S deficiency and splenic infarct in 2011 with history of   anticoagulation prior to transplant. It has been determined that this is likely   due to an acute illness and inflammation at the time of her protein S   deficiency diagnosis and she currently does not require anticoagulation.   - History of monoclonal IgG lambda gammopathy.   - Hypothyroidism.   - Hypertension.   - Depression and bipolar disorder.   - Restless leg syndrome.   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 Breathing improved but quit [employer], but plans to swim again     Sleep: melatonin doesn't help, some meditation - sleeping more reg  sleeps in clusters of 3 hrs/time - improving now     Stress: struggles with anxiety, can get overwhelmed sometimes - doing ok  mother moving away causing anxiety  Management: on lorazepam prn     Natural Products:        ROS:  no ha, visual symptoms, hearing loss  no sob, chest pain, palp  ROS o/w non contributory, please see HPI    Objective    BSA: There is no height or weight on file to calculate BSA.  There were no vitals taken for this visit.    PHYSICAL EXAM:  NAD, awake/alert  HEENT, NCAT, OP clear, no oral lesions  CTA bilat  RRR no mgr  Abd soft/nt/nd+bs  No c/c/e/ttp  Motor/sensory intact, CN 2-12 intact     RESULTS:  Lab Results   Component Value Date    WBC 4.9 07/21/2023    HGB 12.1 07/21/2023    HCT 36.6 07/21/2023     07/21/2023     04/21/2023    CREATININE 0.63 07/21/2023    AST 17 07/21/2023       Integrative Labs:    Functional Tests:    Assessment/Plan   41 yo woman with h/o Stage IIb HL, 6/13  - s/p ABVD x6 cycles with persistent disease noted in November 2013   s/p auto txp, clinical trial with brentuximab/bendamustine  pembro - c/b immune pneumonitis  exacc by radiation     Pain - acupuncture, continued  cont supp onc care incl fentanyl patch  May consider switching methadone - considering in a few weeks     Neuropathic pain - was on topomax     Stress management/anxiety: Improved with mother moving away really  Massage in future -  "symptom management - doing better now along with sleep  Reiki   Meditation - focus on regularity - reading \"The Book of Awakening\", considering \"Radical Acceptance\"  Cont water exercises  F/u psych - consulted and following, sugg more regularity  Going to a therapy, CBT and somatic therapy as well     Wt gain - related to steroids  s/p gastric bypass  probiotics  dietitian consultation - backed up right now - appt in December but considering working with Bariatrics  5-9 fruits/veg/day, limiting sugar - diff taking in too many vegetables, considering a fruit/veg powder but will start     F/u Symptom management clinic  Integrative Oncology prn    Thank you for consulting me in for this patient  Ty Beebe MD     "

## 2023-11-13 ENCOUNTER — APPOINTMENT (OUTPATIENT)
Dept: HEMATOLOGY/ONCOLOGY | Facility: HOSPITAL | Age: 43
End: 2023-11-13
Payer: COMMERCIAL

## 2023-11-20 ENCOUNTER — TELEPHONE (OUTPATIENT)
Dept: HEMATOLOGY/ONCOLOGY | Facility: HOSPITAL | Age: 43
End: 2023-11-20
Payer: COMMERCIAL

## 2023-11-20 ENCOUNTER — TELEPHONE (OUTPATIENT)
Dept: PALLIATIVE MEDICINE | Facility: HOSPITAL | Age: 43
End: 2023-11-20
Payer: COMMERCIAL

## 2023-11-20 DIAGNOSIS — G89.3 CHRONIC PAIN DUE TO NEOPLASM: Primary | ICD-10-CM

## 2023-11-20 DIAGNOSIS — Z51.5 PALLIATIVE CARE ENCOUNTER: ICD-10-CM

## 2023-11-20 RX ORDER — OXYCODONE HYDROCHLORIDE 5 MG/1
5 TABLET ORAL EVERY 6 HOURS PRN
Qty: 120 TABLET | Refills: 0 | Status: SHIPPED | OUTPATIENT
Start: 2023-11-20 | End: 2023-12-20

## 2023-11-20 NOTE — TELEPHONE ENCOUNTER
Patient called in requesting refills for Oxycodone 2.5-5mg tabs.  Normally takes oxycodone 4 times per day but requesting to be increased to 5 times per day.  Also requesting to be switched to methadone from fentanyl to control her pain as discussed at her last visit.  She did not have a follow up appt with Mishel Medrano scheduled yet since Oct.  I was able to get her an appt with for tomorrow virtually.

## 2023-11-20 NOTE — TELEPHONE ENCOUNTER
Patient needs a refill on her Oxycodone, patient states she would like to increase it back to  5 times a day.  Also she states she is ready to switch from Fentanyl to Methadone.    Thanks,

## 2023-11-21 ENCOUNTER — TELEMEDICINE (OUTPATIENT)
Dept: PALLIATIVE MEDICINE | Facility: HOSPITAL | Age: 43
End: 2023-11-21
Payer: COMMERCIAL

## 2023-11-21 DIAGNOSIS — C81.90 HODGKIN LYMPHOMA, UNSPECIFIED HODGKIN LYMPHOMA TYPE, UNSPECIFIED BODY REGION (MULTI): ICD-10-CM

## 2023-11-21 DIAGNOSIS — M54.2 CHRONIC CERVICAL PAIN: ICD-10-CM

## 2023-11-21 DIAGNOSIS — G89.3 CHRONIC PAIN DUE TO NEOPLASM: ICD-10-CM

## 2023-11-21 DIAGNOSIS — Z51.5 ENCOUNTER FOR PALLIATIVE CARE: Primary | ICD-10-CM

## 2023-11-21 DIAGNOSIS — G62.9 POLYNEUROPATHY: ICD-10-CM

## 2023-11-21 DIAGNOSIS — M19.90 ARTHRITIS: ICD-10-CM

## 2023-11-21 DIAGNOSIS — G89.29 CHRONIC CERVICAL PAIN: ICD-10-CM

## 2023-11-21 DIAGNOSIS — M79.7 FIBROMYALGIA: ICD-10-CM

## 2023-11-21 DIAGNOSIS — F32.A ANXIETY AND DEPRESSION: ICD-10-CM

## 2023-11-21 DIAGNOSIS — F41.9 ANXIETY: ICD-10-CM

## 2023-11-21 DIAGNOSIS — F41.9 ANXIETY AND DEPRESSION: ICD-10-CM

## 2023-11-21 DIAGNOSIS — K59.09 CHRONIC CONSTIPATION: ICD-10-CM

## 2023-11-21 PROCEDURE — 99214 OFFICE O/P EST MOD 30 MIN: CPT | Performed by: NURSE PRACTITIONER

## 2023-11-21 RX ORDER — TOPIRAMATE 100 MG/1
100 TABLET, COATED ORAL 3 TIMES DAILY
Qty: 90 TABLET | Refills: 2 | Status: SHIPPED | OUTPATIENT
Start: 2023-11-21 | End: 2024-05-15 | Stop reason: SDUPTHER

## 2023-11-21 RX ORDER — METHADONE HYDROCHLORIDE 10 MG/1
10 TABLET ORAL EVERY 12 HOURS
Qty: 60 TABLET | Refills: 0 | Status: SHIPPED | OUTPATIENT
Start: 2023-11-21 | End: 2023-12-21

## 2023-11-21 NOTE — PROGRESS NOTES
SUPPORTIVE AND PALLIATIVE ONCOLOGY OUTPATIENT FOLLOW-UP    Virtual or Telephone Consent    A telephone visit (audio only) between the patient (at the originating site) and the provider (at the distant site) was utilized to provide this telehealth service.   Verbal consent was requested and obtained from Isaias Chamorro on this date, 11/21/23 for a telehealth visit.     SERVICE DATE: 11/21/2023    Subjective   HISTORY OF PRESENT ILLNESS: Isaias Chamorro is a 43 y.o. female who presents with past medical history of Hodgkin's Lymphoma, originally diagnosed June 2013, with recent relapse in March of 2021.  She received RT therapy to paraspinal mass, was pursuing Pembrolizumab but developed pneumonitis. Currently on surveillance.   Patient follows with Supportive Oncology for pain and further symptom management.     Pain Assessment:  Pain Score:  5  Location:  neck/shoulders/back    Symptom Assessment:  Pain:somewhat - notes pain continues to be bothersome in her neck, back, and b/l shoulders. She currently continues on Fentanyl 12mcg patch and has been taking Oxycodone 5mg about 5x per day, and she feels it is just not managing her overall pain well anymore. She is open today to rotate to Methadone. She continues to go to acupuncture, has started stretching exercises through a massage therapist once a month, and is looking into restarting aquatic therapy again. Neuropathy continues to be well controlled with Topiramate  Numbness or Tingling in hands/feet/other: a little - as above  Sore Muscles/Spasms: a little - as above; tizanidine assists well PRN for muscle spasms  Headache: none  Dizziness:none  Constipation: a little - chronic in nature; continues Linzess and Senna  Diarrhea: none  Nausea: none  Vomiting: none  Lack of Appetite: none   Weight Loss: none  Taste changes: none  Dry Mouth: none  Pain in Mouth/Swallowing: none  Lack of Energy: none  Difficulty Sleeping: none  Worrying:  none  Anxiety: a little  Depression: a little - continues integrative therapy as well as following with her therapist  Shortness of breath: none  Other: none    Information obtained from: chart review and interview of patient  ______________________________________________________________________        Objective     PHYSICAL EXAMINATION   Vital Signs:   Not complete dt telephone visit    Physical Exam  Not complete dt telephone visit    ASSESSMENT/PLAN    Pain  Pain is: chronic - post cancer therapy  Type: somatic and neuropathic  Home regimen:   - Stop Fentanyl Patch 12.5mcg/hr, changing every 72 hours  - Start Methadone 10mg BID  - EKG next visit  - Continue Oxycodone 2.5-5mg q4-6hours PRN  - Continue Tizanidine 2mg 1-2x per day for muscle spasms  - Continue Topiramate 100mg TID  - Following with Dr Beebe - pursuing acupuncture  - Pursuing massage therapy/stretching  - Restarting Aquatic Therapy  Intolerances/previously tried:   - Unable to take NSAIDs d/t bariatric surgery and Abreu's Esophagus  - Gabapentin - did not tolerate - excessive weight gain  - Pregabalin - did not tolerate  - Duloxetine - did not tolerate in past  - D/C Nortriptyline 10mg once daily at bed due to concerns of serotonin syndrome      Opioid Use  Medication Management:   - OARRS report reviewed with no aberrant behavior; consistent with  prescriptions/records and patient history  - MED 45.  Overdose Risk Score 200.   This has been discussed with patient.   - We will continue to closely monitor the patient for signs of prescription misuse including UDS, OARRS review and subjective reports at each visit.  - Concurrent benzodiazepine use with lorazepam, educated on risks.  - I am a provider who either is or has consulted and collaborated with a provider certified in Hospice and Palliative Medicine and have conducted a face-face visit and examination for this patient.  - Routine Urine Drug Screen: 4/21/23 appropriately + for prescribed  medications and - for illicits  - Controlled Substance Agreement: 4/21/23  - Specifically discussed that controlled substance prescriptions will only be provided by our group as outlined in the completed agreement  - Prescribed naloxone: 5/6/22  - Red Flags: none     Nausea (improved)  - Continue Ondansetron 8mg [id number] PRN  - Continue Lorazepam 0.5mg, 1/2-1 tab q8 hours PRN     Constipation (Chronic in nature)  At risk for constipation related to opioids.  Home regimen:   - Following with Gastro Dr. Borrero  - Continue Linzess as prescribed  - Continue Senna-S PRN  - Continue Milk of Magnesia 24% Concentrate - 10mL once daily PRN for severe constipation  - Encouraged trying Bisacoydl suppository in addition for further relief     Altered Mood  Chronic anxiety and depression related to hx of bipolar disorder .  Home regimen:   - Managed by outside provider - appreciate recs  - Following with trauma counselor  - Pursuing acupuncture/massage  - Continue Lamictal 75mg daily  - Continue Sertraline 150mg daily  - Continue Lorazepam 0.5mg, 1/2 to 1 tablet [id number] PRN for anxiety  - Continue Abilify as prescribed by psychiatrist    Supportive and Palliative Oncology Encounter:  Emotional support provided  Coordination of care  Will continue to follow and address symptoms as needed    Next Follow-Up Visit:  Return to clinic in 1 month in person    Signature and billing  Medical complexity was moderate level due to due to complexity of problems, extensive data review, and high risk of management/treatment.  Time was spent on the following: Prep Time, Time Directly with Patient/Family/Caregiver, Documentation Time. Total time spent: 30    Data  Some elements copied from Palliative Care note on 10/6/23, the elements have been updated and all reflect current decision making from today, 11/21/2023.      Plan of Care discussed with: Patient    SIGNATURE: MORRO Maldonado-CNP    Contact information:  Supportive and Palliative  Oncology  Monday-Friday 8 AM-5 PM  Phone:  [phone], press option #5, then option #1.   Or Epic Secure Chat

## 2023-12-01 DIAGNOSIS — C81.90 HODGKIN LYMPHOMA, UNSPECIFIED HODGKIN LYMPHOMA TYPE, UNSPECIFIED BODY REGION (MULTI): ICD-10-CM

## 2023-12-07 ENCOUNTER — APPOINTMENT (OUTPATIENT)
Dept: NUTRITION | Facility: HOSPITAL | Age: 43
End: 2023-12-07
Payer: COMMERCIAL

## 2023-12-12 ENCOUNTER — TELEPHONE (OUTPATIENT)
Dept: PALLIATIVE MEDICINE | Facility: HOSPITAL | Age: 43
End: 2023-12-12
Payer: COMMERCIAL

## 2023-12-12 NOTE — TELEPHONE ENCOUNTER
Patient called to schedule FUV with Mishel Medrano CNP. Patient accepted FUV on 12/29/23 at 1pm at [facility].

## 2023-12-15 ENCOUNTER — TELEPHONE (OUTPATIENT)
Dept: HEMATOLOGY/ONCOLOGY | Facility: HOSPITAL | Age: 43
End: 2023-12-15
Payer: COMMERCIAL

## 2023-12-15 DIAGNOSIS — C81.90 HODGKIN LYMPHOMA, UNSPECIFIED HODGKIN LYMPHOMA TYPE, UNSPECIFIED BODY REGION (MULTI): ICD-10-CM

## 2023-12-15 NOTE — TELEPHONE ENCOUNTER
RN called patient because she no showed to a port appointment and follow up visit with Gilma Donohue today. She said she was unable to drive to the appt today because she had one of her narcotics recently adjusted and did not feel safe to drive. RN offered to have social work contact her to set up a ride but patient said she already has one set up for 12/29 for her Pall care visit and was wondering if CINDY Dillard has availability. Gilma has a spot at 2pm. Patient was agreeable. Another concern NP had was that in our system it looks like she has not had her port flushed in about 5 or 6 months. The patient said that she had it flushed at another appt in the past 3 months so she is not concerned about waiting to get it flushed for another 2 weeks when her next visit is. Scheduling orders placed.

## 2023-12-28 NOTE — ASSESSMENT & PLAN NOTE
Hodgkin Lymphoma Diagnosis:  - stage IIB HL, dx 6/2013     ABVD:  - s/p ABVD x6 cycles with persistent disease noted in November 2013     BR:  - Enrolled on clinical trial [id number]-1412 with bendamustine and rituximab for 2 cycles  - PET-CT in March 2014 noted a CR     aSCT:  - s/p stem cell mobilization and collection:  collected 3.55 x10(6) CD34 cells per kg.   - s/p aSCT with BEAM preparative regimen  on April 15, 2014. Hospitalization complicated by neutropenic fever, pneumonia, menstrual bleeding, abdominal pain, nausea, vomiting, diarrhea, transaminitis secondary to medications, and electrolyte abnormalities.      Maintenance:  - s/p maintenance Brentuximab per clinical  research trial [id number], given on day 1 of each 21 day cycle.    Completed 16 cycles with CR by PET.     Relapse:  - She had imaging in July 2015 that suggested a possible recurrence. This was histologically confirmed by resection of a mediastinal mass by Dr. Oreilly.      Radiation:  - She began radiation therapy on 9/3/15 which completed on 10/6/15     Relapse:  - Admitted to [facility] (2/26/21) for weakness in left upper extremity.  MRI (2/23/21) demonstrated a tumor in the left paraspinous region and brachial plexus partially filling the C7-T1 neural foramen  (no cord compression), and enlarged left supraclavicular nodes. Ortho/Spine consulted on admit, no surgical intervention needed. PET scan (3/1/21) demonstrated left paraspinal mass, left supraclavicular node, several cervical LN. Patient underwent a core  biopsy x 3 of the left supraclavicular node, confirmed Classical Hodgkin Lymphoma relapse. Patient was started on Prednisone 50mg daily      Pembro:  - Salvage pembrolizumab given 3/23, 4/13, 5/4, 5/25, 6/18, 7/9/2021  - EOT PET showed Deauville 1 response.  Discussed in tumor board on 8/19/21, reviewed with rad onc; appears that her relapse overlapped some of her prior radiation ports.  Recommended consideration of consolidative  radiation.     Radiation:  - 19 fractions planned to recurrence site started in early Oct, 2021 (10/19/21-11/11/21)  - EOT PET 11/29/21 - deauville 1     Maintenance:  - Pembro q3w beginning 2/11/22.  Additional doses 3/4/22, 3/25/22, 5/6/22, 5/27/22, 6/17/22, 7/8/22.  - C/b interstitial pneumonitis (8/2022) requiring O2 supplementation, extended prednisone taper     Other PMH:  - History of protein S deficiency and splenic infarct in 2011 with history of anticoagulation prior to transplant. It has been determined that this is likely due to an acute illness and inflammation at the time of her protein S deficiency diagnosis and  she currently does not require anticoagulation.  - History of monoclonal IgG lambda gammopathy.  - Hypothyroidism.  - Hypertension.  - Depression and bipolar disorder.  - Restless leg syndrome.  - RUE celulitis d/t infiltrated IV during Brentuximab infusion (hospitalized 12/24 -12/30/14) & treated with Clindamycin  - COVID 4/2022  - Uvular swelling spring of 2022, s/p course of steroids from ENT without significant improvement; held pembro after 7/8/22

## 2023-12-28 NOTE — PROGRESS NOTES
Patient ID:  Isaias Chamorro is a 43 y.o. female.  Referring Physician:   ONC Dr Webber  Primary Care Provider:  MORRO Jacobson-CNP    Assessment/Plan      12/29/23 No B symptoms. Recent flu; sx slowly resolving.     Oncology History    No history exists.        Problem List Items Addressed This Visit             ICD-10-CM    Hodgkins lymphoma (CMS/HCC) - Primary C81.90     Hodgkin Lymphoma Diagnosis:  - stage IIB HL, dx 6/2013     ABVD:  - s/p ABVD x6 cycles with persistent disease noted in November 2013     BR:  - Enrolled on clinical trial SEGE-1412 with bendamustine and rituximab for 2 cycles  - PET-CT in March 2014 noted a CR     aSCT:  - s/p stem cell mobilization and collection:  collected 3.55 x10(6) CD34 cells per kg.   - s/p aSCT with BEAM preparative regimen  on April 15, 2014. Hospitalization complicated by neutropenic fever, pneumonia, menstrual bleeding, abdominal pain, nausea, vomiting, diarrhea, transaminitis secondary to medications, and electrolyte abnormalities.      Maintenance:  - s/p maintenance Brentuximab per clinical  research trial [id number], given on day 1 of each 21 day cycle.    Completed 16 cycles with CR by PET.     Relapse:  - She had imaging in July 2015 that suggested a possible recurrence. This was histologically confirmed by resection of a mediastinal mass by Dr. Oreilly.      Radiation:  - She began radiation therapy on 9/3/15 which completed on 10/6/15     Relapse:  - Admitted to [facility] (2/26/21) for weakness in left upper extremity.  MRI (2/23/21) demonstrated a tumor in the left paraspinous region and brachial plexus partially filling the C7-T1 neural foramen  (no cord compression), and enlarged left supraclavicular nodes. Ortho/Spine consulted on admit, no surgical intervention needed. PET scan (3/1/21) demonstrated left paraspinal mass, left supraclavicular node, several cervical LN. Patient underwent a core  biopsy x 3 of the left supraclavicular node,  confirmed Classical Hodgkin Lymphoma relapse. Patient was started on Prednisone 50mg daily      Pembro:  - Salvage pembrolizumab given 3/23, 4/13, 5/4, 5/25, 6/18, 7/9/2021  - EOT PET showed Deauville 1 response.  Discussed in tumor board on 8/19/21, reviewed with rad onc; appears that her relapse overlapped some of her prior radiation ports.  Recommended consideration of consolidative radiation.     Radiation:  - 19 fractions planned to recurrence site started in early Oct, 2021 (10/19/21-11/11/21)  - EOT PET 11/29/21 - deauville 1     Maintenance:  - Pembro q3w beginning 2/11/22.  Additional doses 3/4/22, 3/25/22, 5/6/22, 5/27/22, 6/17/22, 7/8/22.  - C/b interstitial pneumonitis (8/2022) requiring O2 supplementation, extended prednisone taper     Other PMH:  - History of protein S deficiency and splenic infarct in 2011 with history of anticoagulation prior to transplant. It has been determined that this is likely due to an acute illness and inflammation at the time of her protein S deficiency diagnosis and  she currently does not require anticoagulation.  - History of monoclonal IgG lambda gammopathy.  - Hypothyroidism.  - Hypertension.  - Depression and bipolar disorder.  - Restless leg syndrome.  - RUE celulitis d/t infiltrated IV during Brentuximab infusion (hospitalized 12/24 -12/30/14) & treated with Clindamycin  - COVID 4/2022  - Uvular swelling spring of 2022, s/p course of steroids from ENT without significant improvement; held pembro after 7/8/22         Drug-induced pneumonitis J98.4, T50.905A     Pulm:  - Interstitial pneumonitis, likely 2/2 pembro  - Seen in pulmonary clinic on 9/12 and 10/10. On 10/11, she underwent high res CT which noted significant improvement along with new areas of ground glass opacities.  - CT chest much improved 4/2023.  Off O2 and prednisone            Subjective    History of Present Illness:  Isaias presents to the clinic today, unaccompanied.     Got the flu on Dec 16.  Still recovering. Cough. No appetite. Diarrhea. Lost some weight.    Off O2 since May. Breathing ok. Pulse ox didn't drop with recent flu.     In therapy, 8 sessions so far. Likes therapist.     Low dose methadone effective for pain. Follows with Supportive Oncology team.    Denies B symptoms: unexplained weight loss, night sweats, fever.        Review of Systems   Constitutional:  Positive for appetite change.   HENT:  Negative.     Eyes: Negative.    Respiratory:  Positive for cough.    Cardiovascular: Negative.    Gastrointestinal:  Positive for diarrhea and nausea.   Endocrine: Negative.    Genitourinary: Negative.     Musculoskeletal: Negative.    Skin: Negative.    Neurological: Negative.    Hematological: Negative.    Psychiatric/Behavioral: Negative.              Objective        Physical Exam  Vitals reviewed.   Constitutional:       Appearance: Normal appearance.   HENT:      Head: Normocephalic and atraumatic.      Nose: Nose normal.      Mouth/Throat:      Mouth: Mucous membranes are moist.   Eyes:      Pupils: Pupils are equal, round, and reactive to light.   Cardiovascular:      Rate and Rhythm: Normal rate and regular rhythm.      Pulses: Normal pulses.      Heart sounds: Normal heart sounds.   Pulmonary:      Effort: Pulmonary effort is normal.      Breath sounds: Normal breath sounds.   Abdominal:      General: Abdomen is flat. Bowel sounds are normal.      Palpations: Abdomen is soft.      Comments: No splenomegaly.   Musculoskeletal:         General: Normal range of motion.   Lymphadenopathy:      Comments: No cervical, supraclavicular, axillary or inguinal lymphadenopathy palpable.    Skin:     General: Skin is warm and dry.   Neurological:      General: No focal deficit present.      Mental Status: She is alert and oriented to person, place, and time.   Psychiatric:         Mood and Affect: Mood normal.       RTC 2m for port flush and 4m for port flush and Dr Webber.

## 2023-12-28 NOTE — ASSESSMENT & PLAN NOTE
Pulm:  - Interstitial pneumonitis, likely 2/2 pembro  - Seen in pulmonary clinic on 9/12 and 10/10. On 10/11, she underwent high res CT which noted significant improvement along with new areas of ground glass opacities.  - CT chest much improved 4/2023.  Off O2 and prednisone

## 2023-12-29 ENCOUNTER — TELEPHONE (OUTPATIENT)
Dept: HEMATOLOGY/ONCOLOGY | Facility: HOSPITAL | Age: 43
End: 2023-12-29

## 2023-12-29 ENCOUNTER — OFFICE VISIT (OUTPATIENT)
Dept: PALLIATIVE MEDICINE | Facility: HOSPITAL | Age: 43
End: 2023-12-29
Payer: MEDICARE

## 2023-12-29 ENCOUNTER — LAB (OUTPATIENT)
Dept: HEMATOLOGY/ONCOLOGY | Facility: HOSPITAL | Age: 43
End: 2023-12-29
Payer: MEDICARE

## 2023-12-29 ENCOUNTER — OFFICE VISIT (OUTPATIENT)
Dept: HEMATOLOGY/ONCOLOGY | Facility: HOSPITAL | Age: 43
End: 2023-12-29
Payer: MEDICARE

## 2023-12-29 ENCOUNTER — APPOINTMENT (OUTPATIENT)
Dept: HEMATOLOGY/ONCOLOGY | Facility: HOSPITAL | Age: 43
End: 2023-12-29
Payer: MEDICARE

## 2023-12-29 VITALS
RESPIRATION RATE: 18 BRPM | TEMPERATURE: 97.9 F | SYSTOLIC BLOOD PRESSURE: 141 MMHG | BODY MASS INDEX: 42.5 KG/M2 | HEART RATE: 92 BPM | WEIGHT: 210.4 LBS | OXYGEN SATURATION: 96 % | DIASTOLIC BLOOD PRESSURE: 93 MMHG

## 2023-12-29 DIAGNOSIS — Z51.5 PALLIATIVE CARE ENCOUNTER: ICD-10-CM

## 2023-12-29 DIAGNOSIS — C81.90 HODGKIN LYMPHOMA, UNSPECIFIED HODGKIN LYMPHOMA TYPE, UNSPECIFIED BODY REGION (MULTI): ICD-10-CM

## 2023-12-29 DIAGNOSIS — G89.29 CHRONIC CERVICAL PAIN: ICD-10-CM

## 2023-12-29 DIAGNOSIS — C81.90 HODGKIN LYMPHOMA, UNSPECIFIED HODGKIN LYMPHOMA TYPE, UNSPECIFIED BODY REGION (MULTI): Primary | ICD-10-CM

## 2023-12-29 DIAGNOSIS — F41.9 ANXIETY AND DEPRESSION: ICD-10-CM

## 2023-12-29 DIAGNOSIS — F32.A ANXIETY AND DEPRESSION: ICD-10-CM

## 2023-12-29 DIAGNOSIS — G62.9 POLYNEUROPATHY: ICD-10-CM

## 2023-12-29 DIAGNOSIS — J98.4 DRUG-INDUCED PNEUMONITIS: ICD-10-CM

## 2023-12-29 DIAGNOSIS — M19.90 ARTHRITIS: ICD-10-CM

## 2023-12-29 DIAGNOSIS — K59.09 CHRONIC CONSTIPATION: ICD-10-CM

## 2023-12-29 DIAGNOSIS — G89.3 CHRONIC PAIN DUE TO NEOPLASM: ICD-10-CM

## 2023-12-29 DIAGNOSIS — Z51.81 ENCOUNTER FOR MONITORING OPIOID MAINTENANCE THERAPY: Primary | ICD-10-CM

## 2023-12-29 DIAGNOSIS — T50.905A DRUG-INDUCED PNEUMONITIS: ICD-10-CM

## 2023-12-29 DIAGNOSIS — M79.7 FIBROMYALGIA: ICD-10-CM

## 2023-12-29 DIAGNOSIS — F41.9 ANXIETY: ICD-10-CM

## 2023-12-29 DIAGNOSIS — M54.2 CHRONIC CERVICAL PAIN: ICD-10-CM

## 2023-12-29 DIAGNOSIS — Z79.891 ENCOUNTER FOR MONITORING OPIOID MAINTENANCE THERAPY: Primary | ICD-10-CM

## 2023-12-29 LAB
ALBUMIN SERPL BCP-MCNC: 4.1 G/DL (ref 3.4–5)
ALP SERPL-CCNC: 76 U/L (ref 33–110)
ALT SERPL W P-5'-P-CCNC: 20 U/L (ref 7–45)
ANION GAP SERPL CALC-SCNC: 13 MMOL/L (ref 10–20)
AST SERPL W P-5'-P-CCNC: 24 U/L (ref 9–39)
BASOPHILS # BLD AUTO: 0.01 X10*3/UL (ref 0–0.1)
BASOPHILS NFR BLD AUTO: 0.3 %
BILIRUB SERPL-MCNC: 0.5 MG/DL (ref 0–1.2)
BUN SERPL-MCNC: 9 MG/DL (ref 6–23)
CALCIUM SERPL-MCNC: 8.7 MG/DL (ref 8.6–10.3)
CHLORIDE SERPL-SCNC: 107 MMOL/L (ref 98–107)
CO2 SERPL-SCNC: 24 MMOL/L (ref 21–32)
CREAT SERPL-MCNC: 0.54 MG/DL (ref 0.5–1.05)
EOSINOPHIL # BLD AUTO: 0.05 X10*3/UL (ref 0–0.7)
EOSINOPHIL NFR BLD AUTO: 1.4 %
ERYTHROCYTE [DISTWIDTH] IN BLOOD BY AUTOMATED COUNT: 14 % (ref 11.5–14.5)
GFR SERPL CREATININE-BSD FRML MDRD: >90 ML/MIN/1.73M*2
GLUCOSE SERPL-MCNC: 105 MG/DL (ref 74–99)
HCT VFR BLD AUTO: 35.6 % (ref 36–46)
HGB BLD-MCNC: 11.8 G/DL (ref 12–16)
IMM GRANULOCYTES # BLD AUTO: 0.01 X10*3/UL (ref 0–0.7)
IMM GRANULOCYTES NFR BLD AUTO: 0.3 % (ref 0–0.9)
LDH SERPL L TO P-CCNC: 193 U/L (ref 84–246)
LYMPHOCYTES # BLD AUTO: 0.93 X10*3/UL (ref 1.2–4.8)
LYMPHOCYTES NFR BLD AUTO: 26.5 %
MAGNESIUM SERPL-MCNC: 2 MG/DL (ref 1.6–2.4)
MCH RBC QN AUTO: 27.3 PG (ref 26–34)
MCHC RBC AUTO-ENTMCNC: 33.1 G/DL (ref 32–36)
MCV RBC AUTO: 82 FL (ref 80–100)
MONOCYTES # BLD AUTO: 0.27 X10*3/UL (ref 0.1–1)
MONOCYTES NFR BLD AUTO: 7.7 %
NEUTROPHILS # BLD AUTO: 2.24 X10*3/UL (ref 1.2–7.7)
NEUTROPHILS NFR BLD AUTO: 63.8 %
NRBC BLD-RTO: 0 /100 WBCS (ref 0–0)
PLATELET # BLD AUTO: 196 X10*3/UL (ref 150–450)
POTASSIUM SERPL-SCNC: 3.5 MMOL/L (ref 3.5–5.3)
PROT SERPL-MCNC: 7.2 G/DL (ref 6.4–8.2)
RBC # BLD AUTO: 4.32 X10*6/UL (ref 4–5.2)
SODIUM SERPL-SCNC: 140 MMOL/L (ref 136–145)
URATE SERPL-MCNC: 4.3 MG/DL (ref 2.3–6.7)
WBC # BLD AUTO: 3.5 X10*3/UL (ref 4.4–11.3)

## 2023-12-29 PROCEDURE — 3008F BODY MASS INDEX DOCD: CPT | Performed by: NURSE PRACTITIONER

## 2023-12-29 PROCEDURE — 84550 ASSAY OF BLOOD/URIC ACID: CPT

## 2023-12-29 PROCEDURE — 36591 DRAW BLOOD OFF VENOUS DEVICE: CPT

## 2023-12-29 PROCEDURE — 99214 OFFICE O/P EST MOD 30 MIN: CPT | Performed by: NURSE PRACTITIONER

## 2023-12-29 PROCEDURE — 4010F ACE/ARB THERAPY RXD/TAKEN: CPT | Performed by: NURSE PRACTITIONER

## 2023-12-29 PROCEDURE — 84075 ASSAY ALKALINE PHOSPHATASE: CPT

## 2023-12-29 PROCEDURE — 1036F TOBACCO NON-USER: CPT | Performed by: NURSE PRACTITIONER

## 2023-12-29 PROCEDURE — 96523 IRRIG DRUG DELIVERY DEVICE: CPT

## 2023-12-29 PROCEDURE — 2500000004 HC RX 250 GENERAL PHARMACY W/ HCPCS (ALT 636 FOR OP/ED): Performed by: NURSE PRACTITIONER

## 2023-12-29 PROCEDURE — 85025 COMPLETE CBC W/AUTO DIFF WBC: CPT

## 2023-12-29 PROCEDURE — 83615 LACTATE (LD) (LDH) ENZYME: CPT

## 2023-12-29 PROCEDURE — 83735 ASSAY OF MAGNESIUM: CPT

## 2023-12-29 PROCEDURE — 3080F DIAST BP >= 90 MM HG: CPT | Performed by: NURSE PRACTITIONER

## 2023-12-29 PROCEDURE — 3077F SYST BP >= 140 MM HG: CPT | Performed by: NURSE PRACTITIONER

## 2023-12-29 RX ORDER — HEPARIN SODIUM,PORCINE/PF 10 UNIT/ML
50 SYRINGE (ML) INTRAVENOUS AS NEEDED
OUTPATIENT
Start: 2023-12-29

## 2023-12-29 RX ORDER — HEPARIN 100 UNIT/ML
500 SYRINGE INTRAVENOUS AS NEEDED
Status: DISCONTINUED | OUTPATIENT
Start: 2023-12-29 | End: 2023-12-29 | Stop reason: HOSPADM

## 2023-12-29 RX ORDER — METHADONE HYDROCHLORIDE 5 MG/1
2.5 TABLET ORAL EVERY 12 HOURS
COMMUNITY
End: 2024-02-16 | Stop reason: SDUPTHER

## 2023-12-29 RX ORDER — OXYCODONE HYDROCHLORIDE 5 MG/1
5 CAPSULE ORAL AS NEEDED
COMMUNITY
End: 2024-04-01 | Stop reason: SDUPTHER

## 2023-12-29 RX ORDER — HEPARIN 100 UNIT/ML
500 SYRINGE INTRAVENOUS AS NEEDED
Status: CANCELLED | OUTPATIENT
Start: 2023-12-29

## 2023-12-29 RX ADMIN — HEPARIN 500 UNITS: 100 SYRINGE at 14:06

## 2023-12-29 ASSESSMENT — ENCOUNTER SYMPTOMS
HEMATOLOGIC/LYMPHATIC NEGATIVE: 1
DIARRHEA: 1
CARDIOVASCULAR NEGATIVE: 1
NAUSEA: 1
PSYCHIATRIC NEGATIVE: 1
ENDOCRINE NEGATIVE: 1
NEUROLOGICAL NEGATIVE: 1
APPETITE CHANGE: 1
MUSCULOSKELETAL NEGATIVE: 1
COUGH: 1
EYES NEGATIVE: 1

## 2023-12-29 ASSESSMENT — PAIN SCALES - GENERAL: PAINLEVEL: 1

## 2023-12-29 NOTE — PROGRESS NOTES
SUPPORTIVE AND PALLIATIVE ONCOLOGY OUTPATIENT FOLLOW-UP      SERVICE DATE: 12/29/2023    Subjective   HISTORY OF PRESENT ILLNESS: Isaias Chamorro is a 43 y.o. female who presents with  past medical history of Hodgkin's Lymphoma, originally diagnosed June 2013, with recent relapse in March of 2021.  She received RT therapy to paraspinal mass, was pursuing Pembrolizumab but developed pneumonitis. Currently on surveillance.   Patient follows with Supportive Oncology for pain and further symptom management.     Pain Assessment:  Pain Score:   1  Location: Neck (upper left hand shoulder, back)  Description:      Symptom Assessment:  Pain:a little - Notes to be doing very well in regards to pain. She started Methadone initially at 10mg BID. Notes it had felt like too much for her with slurred speech and just not feeling herself. She further decreased herself to 5mg BID, and in time weaned herself to 2.5mg BID with good tolerance of medication as well as good control of overall pain. She has very little breakthrough, taking oxycodone 1.25mg to 2.5mg about 4x per day, and continues Topiramate 100mg TID with good overall control of her pain and neuropathy.   Numbness or Tingling in hands/feet/other: a little - as above  Sore Muscles/Spasms: a little - as above, but has not needed Tizanidine  Headache: none  Dizziness:none  Constipation: none  Diarrhea: a little -recently more so with diarrhea due to having the flu  Nausea: a little - recently with the flu, managed well with Ondansetron  Vomiting: none  Lack of Appetite: a little - appetite has been low with being sick, notes ot be conscious of staying hydrated as much as she can with diarrhea  Weight Loss: none  Taste changes: none  Dry Mouth: none  Pain in Mouth/Swallowing: none  Lack of Energy: a little  Difficulty Sleeping: none  Worrying: none  Anxiety: a little - patient continues to follow with trauma therapist and finds a lot of improvement in her overall  mood and anxiety, she is tearful today in discussion of life and things she deals with day to day but truly does find improvement in her mental health and continues to work on it. Feels that with improvement in her anxiety and mood, also came in hand with improvement in her pain  Depression: a little  Shortness of breath: none  Other: none      Information obtained from: chart review and interview of patient  ______________________________________________________________________        Objective     PHYSICAL EXAMINATION   Vital Signs:   Vital signs reviewed  Visit Vitals  BP (!) 141/93 (BP Location: Left arm, Patient Position: Sitting)   Pulse 92   Temp 36.6 °C (97.9 °F) (Core)   Resp 18   Wt 95.4 kg (210 lb 6.4 oz)   SpO2 96%   BMI 42.50 kg/m²   Smoking Status Never   BSA 1.99 m²      Physical Exam  Vitals reviewed.   Constitutional:       Appearance: Normal appearance.   HENT:      Head: Normocephalic.   Cardiovascular:      Rate and Rhythm: Normal rate and regular rhythm.      Pulses: Normal pulses.      Heart sounds: Normal heart sounds.   Abdominal:      General: Abdomen is flat.      Palpations: Abdomen is soft.   Musculoskeletal:         General: Normal range of motion.   Skin:     General: Skin is warm and dry.   Neurological:      General: No focal deficit present.      Mental Status: She is alert and oriented to person, place, and time. Mental status is at baseline.   Psychiatric:         Mood and Affect: Mood normal.         Behavior: Behavior normal.         Thought Content: Thought content normal.         Judgment: Judgment normal.       ASSESSMENT/PLAN    Pain  Pain is: chronic - post cancer therapy  Type: somatic and neuropathic  Home regimen:   - Continue Methadone 2.5mg BID  - EKG (12/29/23) . Redo with each Methadone increase  - Continue Oxycodone 2.5-5mg [id number] PRN  - Continue Tizanidine 2mg 1-2x per day for muscle spasms - has not needed recently  - Continue Topiramate 100mg TID  -  Following with Dr Beebe - pursuing acupuncture  - Pursuing massage therapy/stretching  - Restarting Aquatic Therapy  Intolerances/previously tried:   - Unable to take NSAIDs d/t bariatric surgery and Abreu's Esophagus  - Gabapentin - did not tolerate - excessive weight gain  - Pregabalin - did not tolerate  - Duloxetine - did not tolerate in past  - D/C Nortriptyline 10mg once daily at bed due to concerns of serotonin syndrome      Opioid Use  Medication Management:   - OARRS report reviewed with no aberrant behavior; consistent with  prescriptions/records and patient history  - MED 45.  Overdose Risk Score 200.   This has been discussed with patient.   - We will continue to closely monitor the patient for signs of prescription misuse including UDS, OARRS review and subjective reports at each visit.  - Concurrent benzodiazepine use with lorazepam, educated on risks.  - I am a provider who either is or has consulted and collaborated with a provider certified in Hospice and Palliative Medicine and have conducted a face-face visit and examination for this patient.  - Routine Urine Drug Screen: 4/21/23 appropriately + for prescribed medications and - for illicits  - Controlled Substance Agreement: 4/21/23  - Specifically discussed that controlled substance prescriptions will only be provided by our group as outlined in the completed agreement  - Prescribed naloxone: 5/6/22  - Red Flags: none     Constipation (Chronic in nature)  At risk for constipation related to opioids.  Home regimen:   - Following with Gastro Dr. Borrero  - Continue Linzess as prescribed  - Continue Senna-S PRN  - Continue Milk of Magnesia 24% Concentrate - 10mL once daily PRN for severe constipation  - Encouraged trying Bisacoydl suppository in addition for further relief    Nausea (improved)  - Continue Ondansetron 8mg [id number] PRN  - Continue Lorazepam 0.5mg, 1/2-1 tab q8 hours PRN     Altered Mood  Chronic anxiety and depression related to hx of  bipolar disorder .  Home regimen:   - Managed by outside provider - appreciate recs  - Following with trauma counselor  - Pursuing acupuncture/massage  - Continue Lamictal 75mg daily  - Continue Sertraline 150mg daily  - Continue Lorazepam 0.5mg, 1/2 to 1 tablet [id number] PRN for anxiety  - Continue Abilify as prescribed by psychiatrist     Supportive and Palliative Oncology Encounter:  Emotional support provided  Coordination of care  Will continue to follow and address symptoms as needed    Next Follow-Up Visit:  Return to clinic in 6 weeks virtual    Signature and billing  Medical complexity was moderate level due to due to complexity of problems, extensive data review, and high risk of management/treatment.  Time was spent on the following: Prep Time, Time Directly with Patient/Family/Caregiver, Documentation Time. Total time spent: 35      Data  Some elements copied from Palliative Care note on 11/21/23, the elements have been updated and all reflect current decision making from today, 12/29/2023.      Plan of Care discussed with: Patient    SIGNATURE: MORRO Maldonado-CNP    Contact information:  Supportive and Palliative Oncology  Monday-Friday 8 AM-5 PM  Phone:  [phone], press option #5, then option #1.   Or Epic Secure Chat

## 2023-12-29 NOTE — TELEPHONE ENCOUNTER
Amaris from [facility] calls to verify that the patient may take Oxycodone, Methadone and Lorazepam in combination and benefits outweigh risks.  [facility] [phone]  Message sent to team.

## 2024-01-02 ENCOUNTER — APPOINTMENT (OUTPATIENT)
Dept: SURGERY | Facility: CLINIC | Age: 44
End: 2024-01-02
Payer: COMMERCIAL

## 2024-01-02 ENCOUNTER — NUTRITION (OUTPATIENT)
Dept: SURGERY | Facility: CLINIC | Age: 44
End: 2024-01-02
Payer: COMMERCIAL

## 2024-01-02 VITALS — BODY MASS INDEX: 42.01 KG/M2 | WEIGHT: 208 LBS

## 2024-01-02 NOTE — PROGRESS NOTES
Bariatric Post-Op Follow Up Appointment: RYGB     Isaias had RYGB with Dr. Stewart back in November 2020. The lowest weight that she got down to after surgery was ~165 lbs and she felt very comfortable at this weight. She would like to get back down to this weight, and is seeking weight loss services.     Isaias tells me that back in March 2021 she was diagnosed with cancer. She started chemo and Keytruda medication at that time. She then had Radiation treatment that following October 2021.   Back in August 2022 Isaias was in the hospital for her lungs. She was put on Prednisone for 10 months and noticed significant weight gain and increased appetite & food cravings. She has been weaned off prednisone and is now on a different medication. Isaias states that she gained around 60-65 lbs during that year while being on Prednisone. Isaias reports that this was a very difficult time. Isaias regularly sees her psychiatrist and is prescribed medication for depression. She tells me that her depression is currently managed.   Isaias states that her Doctor prescribed her Wagovy this past June/July 2023, however she could no longer get medication. She lost 20 lbs during that time. She is not taking weight loss medication at this time.   Isaias reports that she was around 227 lbs back in September 2023. She then became sick at the beginning of December 2023 and lost ~15 lbs. Her appetite has been relatively low since. Overall Isaias states that her weight has been relatively stable over the past 2-3 months.     Starting Weight: 266 lbs   Current Weight: 208 lbs  Current BMI: 42.01  Percentage of weight loss achieved: 58% EWL 3.2 years post-op     Dietary Intake:  Breakfast- Likes to have an Atkins Protein shake   Lunch - Light and Fit Greek yogurt or cottage cheese   Dinner- Taco (1) with extra meat (usually chicken), or 2 slices of thin crust pizza, or a pre-made chicken and bean burrito. Takes most of the tortilla off.    Volume of food at a time: ~1/2 cup chicken with 1/4 cup rice or pasta. Total ~3/4 Cup at a time  Snacks: Sometimes in the evening will have another protein shake. Prior was snacking on crackers or dry cereal, however she has been working with her therapist and finding other high protein alternatives   Beverages: Water with crystal light or Sugar free lemonade from [employer]  Alcohol Intake: No   Do you drink with your meals? No   Current Exercise: Minimal at this time, as energy levels are relatively low   Vitamins/minerals: Takes Vitafusion gummy MVI, Calcium citrate with D3 (BID),  and vitamin B12  Isaias did not have Bariatric blood work done. I encouraged Isaias to set up an appointment with Dr. Stewart. I also recommended taking a Multivitamin with iron - recommended Celebrate ONE 18 from our office.  Food intolerances? Many Vegetables such as lettuce, cruciferous vegetables (broccoli, cabbage, brussel sprouts)- makes her very gassy and it can be painful & uncomfortable. This has become worse over time.   Gets Occasional heart burn and takes omeprazole as needed. This is not on a regular basis though per pt, noticed it more when she was sick.   Any decrease in energy levels? Yes, related to decrease in endurance   Any questions or concerns?  Weight gain and would like to lose further weight. I recommended following up with us in one month     Goals for the month - set at today's appointment   - Have at least 50 ounces of water daily   - Consume 60-90 grams of protein daily   - Get up more, recommended getting up every hour     Will follow up at next appointment     Jennifer Powers, MS, RD, LD

## 2024-01-03 ENCOUNTER — APPOINTMENT (OUTPATIENT)
Dept: SURGERY | Facility: CLINIC | Age: 44
End: 2024-01-03
Payer: COMMERCIAL

## 2024-01-26 ENCOUNTER — TELEPHONE (OUTPATIENT)
Dept: ADMISSION | Facility: HOSPITAL | Age: 44
End: 2024-01-26
Payer: COMMERCIAL

## 2024-01-26 DIAGNOSIS — G89.3 CANCER RELATED PAIN: Primary | ICD-10-CM

## 2024-01-26 RX ORDER — OXYCODONE HYDROCHLORIDE 5 MG/1
TABLET ORAL
Qty: 120 TABLET | Refills: 0 | Status: SHIPPED | OUTPATIENT
Start: 2024-01-26 | End: 2024-02-25

## 2024-01-26 NOTE — TELEPHONE ENCOUNTER
Isaias Chamorro called the refill line for Oxycodone 5mg tabs. Would like refills to be sent to [facility] pharmacy on file. Message sent to Palliative Care team to send in.

## 2024-02-02 ENCOUNTER — TELEPHONE (OUTPATIENT)
Dept: ADMISSION | Facility: HOSPITAL | Age: 44
End: 2024-02-02
Payer: COMMERCIAL

## 2024-02-02 DIAGNOSIS — F41.9 ANXIETY: ICD-10-CM

## 2024-02-02 DIAGNOSIS — Z51.5 ENCOUNTER FOR PALLIATIVE CARE: ICD-10-CM

## 2024-02-02 RX ORDER — LORAZEPAM 0.5 MG/1
0.5 TABLET ORAL 3 TIMES DAILY PRN
Qty: 90 TABLET | Refills: 2 | Status: SHIPPED | OUTPATIENT
Start: 2024-02-02 | End: 2024-04-29 | Stop reason: SDUPTHER

## 2024-02-02 NOTE — TELEPHONE ENCOUNTER
OARRS reviewed and no aberrancy noted. Prescription pended to provider to approve. Patient sees Mishel Medrano virtually on 2/16.

## 2024-02-02 NOTE — TELEPHONE ENCOUNTER
Pt called requesting a refill on her Ativan 0.5mg to be [facility]. Message sent to the team.

## 2024-02-16 ENCOUNTER — TELEMEDICINE (OUTPATIENT)
Dept: PALLIATIVE MEDICINE | Facility: CLINIC | Age: 44
End: 2024-02-16
Payer: MEDICARE

## 2024-02-16 DIAGNOSIS — M54.2 CHRONIC CERVICAL PAIN: ICD-10-CM

## 2024-02-16 DIAGNOSIS — F41.9 ANXIETY: ICD-10-CM

## 2024-02-16 DIAGNOSIS — G62.9 POLYNEUROPATHY: ICD-10-CM

## 2024-02-16 DIAGNOSIS — M79.7 FIBROMYALGIA: ICD-10-CM

## 2024-02-16 DIAGNOSIS — Z79.891 ENCOUNTER FOR MONITORING OPIOID MAINTENANCE THERAPY: ICD-10-CM

## 2024-02-16 DIAGNOSIS — M19.90 ARTHRITIS: ICD-10-CM

## 2024-02-16 DIAGNOSIS — Z51.5 PALLIATIVE CARE ENCOUNTER: ICD-10-CM

## 2024-02-16 DIAGNOSIS — K59.09 CHRONIC CONSTIPATION: ICD-10-CM

## 2024-02-16 DIAGNOSIS — Z51.81 ENCOUNTER FOR MONITORING OPIOID MAINTENANCE THERAPY: ICD-10-CM

## 2024-02-16 DIAGNOSIS — F32.A ANXIETY AND DEPRESSION: ICD-10-CM

## 2024-02-16 DIAGNOSIS — C81.90 HODGKIN LYMPHOMA, UNSPECIFIED HODGKIN LYMPHOMA TYPE, UNSPECIFIED BODY REGION (MULTI): ICD-10-CM

## 2024-02-16 DIAGNOSIS — G89.29 CHRONIC CERVICAL PAIN: ICD-10-CM

## 2024-02-16 DIAGNOSIS — F41.9 ANXIETY AND DEPRESSION: ICD-10-CM

## 2024-02-16 DIAGNOSIS — G89.3 CHRONIC PAIN DUE TO NEOPLASM: Primary | ICD-10-CM

## 2024-02-16 PROCEDURE — 4010F ACE/ARB THERAPY RXD/TAKEN: CPT | Performed by: NURSE PRACTITIONER

## 2024-02-16 PROCEDURE — 1036F TOBACCO NON-USER: CPT | Performed by: NURSE PRACTITIONER

## 2024-02-16 PROCEDURE — 3008F BODY MASS INDEX DOCD: CPT | Performed by: NURSE PRACTITIONER

## 2024-02-16 PROCEDURE — 99214 OFFICE O/P EST MOD 30 MIN: CPT | Performed by: NURSE PRACTITIONER

## 2024-02-16 RX ORDER — METHADONE HYDROCHLORIDE 5 MG/1
TABLET ORAL
Qty: 45 TABLET | Refills: 0 | Status: SHIPPED | OUTPATIENT
Start: 2024-02-16 | End: 2024-03-26 | Stop reason: SDUPTHER

## 2024-02-16 NOTE — PROGRESS NOTES
SUPPORTIVE AND PALLIATIVE ONCOLOGY OUTPATIENT FOLLOW-UP    Virtual or Telephone Consent    An interactive audio and video telecommunication system which permits real time communications between the patient (at the originating site) and provider (at the distant site) was utilized to provide this telehealth service.   Verbal consent was requested and obtained from Isaias Chamorro on this date, 02/16/24 for a telehealth visit.     SERVICE DATE: 2/16/2024    Subjective   HISTORY OF PRESENT ILLNESS: Isaias Chamorro is a 43 y.o. female who presents with past medical history of Hodgkin's Lymphoma, originally diagnosed June 2013, with recent relapse in March of 2021.  She received RT therapy to paraspinal mass, was pursuing Pembrolizumab but developed pneumonitis. Currently on surveillance.   Patient follows with Supportive Oncology for pain and further symptom management.      Pain Assessment:  Pain Score:  3  Location:  right shoulder  Description:      Symptom Assessment:  Pain:somewhat - doing fairly well, pain has been slightly increase in the right shoulder and neck with concerns of frozen shoulder. She has been taking Methadone 2.5mg AM and 5mg at bed, with Oxycodone for breakthrough, taking 1.5 tabs per 24 hours with good management of pain. Taking Tizanidine PRN for tightness which does help but at times makes her sleepy so she is careful with taking that, and continues Topiramate as well with good control of neuropathic pain. Overall pain is well controlled  Numbness or Tingling in hands/feet/other: a little - as above  Sore Muscles/Spasms: none  Headache: none  Dizziness:none  Constipation: a little - managed with taking a stool softener every other day  Diarrhea: none  Nausea: none  Vomiting: none  Lack of Appetite: none   Weight Loss: none  Taste changes: none  Dry Mouth: none  Pain in Mouth/Swallowing: none  Lack of Energy: none  Difficulty Sleeping: none  Worrying: none  Anxiety: a  "little  Depression: a little - mood is up an down but overall stable. Continues regularly with her trauma counselor every 2 weeks and continues with her psychiatrist for medication management  Shortness of breath: none  Other: none      Information obtained from: chart review and interview of patient  ______________________________________________________________________        Objective     PHYSICAL EXAMINATION   Vital Signs:       4/19/2023     1:57 PM 5/11/2023    11:38 AM 5/16/2023     2:58 PM 6/15/2023    11:41 AM 10/2/2023     4:23 PM 12/29/2023     1:14 PM 1/2/2024     1:11 PM   Vitals   Systolic 117 120 120 108 113 141    Diastolic 82 78 80 80 81 93    Heart Rate 89  99  85 92    Temp 36.5 °C (97.7 °F)    36.2 °C (97.2 °F) 36.6 °C (97.9 °F)    Resp      18    Height (in)  1.499 m (4' 11\")  1.499 m (4' 11\")      Weight (lb) 223 233 231 221 209 210.4 208   BMI 45.04 kg/m2 47.06 kg/m2 46.66 kg/m2 44.64 kg/m2 42.21 kg/m2 42.5 kg/m2 42.01 kg/m2   BSA (m2) 2.05 m2 2.1 m2 2.09 m2 2.04 m2 1.99 m2 1.99 m2 1.98 m2   Visit Report   Report  Report Report    Report         Physical Exam  Not complete dt virtual visit    ASSESSMENT/PLAN    Pain  Pain is: chronic - post cancer therapy  Type: somatic and neuropathic  Home regimen:   - Continue Methadone 2.5mg AM and 5mg at bed  - EKG (12/29/23) . Redo with each Methadone increase  - Continue Oxycodone 2.5-5mg [id number] PRN  - Continue Tizanidine 2mg 1-2x per day for muscle spasms - has not needed recently  - Continue Topiramate 100mg TID  - Following with Dr Beebe - pursuing acupuncture  - Pursuing massage therapy/stretching  - Restarting Aquatic Therapy/PT  Intolerances/previously tried:   - Unable to take NSAIDs d/t bariatric surgery and Abreu's Esophagus  - Gabapentin - did not tolerate - excessive weight gain  - Pregabalin - did not tolerate  - Duloxetine - did not tolerate in past  - D/C Nortriptyline 10mg once daily at bed due to concerns of serotonin syndrome    "   Opioid Use  Medication Management:   - OARRS report reviewed with no aberrant behavior; consistent with  prescriptions/records and patient history  - MED 45.  Overdose Risk Score 200.   This has been discussed with patient.   - We will continue to closely monitor the patient for signs of prescription misuse including UDS, OARRS review and subjective reports at each visit.  - Concurrent benzodiazepine use with lorazepam, educated on risks.  - I am a provider who either is or has consulted and collaborated with a provider certified in Hospice and Palliative Medicine and have conducted a face-face visit and examination for this patient.  - Routine Urine Drug Screen: 4/21/23 appropriately + for prescribed medications and - for illicits  - Controlled Substance Agreement: 4/21/23  - Specifically discussed that controlled substance prescriptions will only be provided by our group as outlined in the completed agreement  - Prescribed naloxone: 5/6/22  - Red Flags: none     Constipation (Chronic in nature)  At risk for constipation related to opioids.  Home regimen:   - Following with Gastro Dr. Borrero  - Continue Linzess as prescribed  - Continue Senna-S PRN  - Continue Milk of Magnesia 24% Concentrate - 10mL once daily PRN for severe constipation  - Encouraged trying Bisacoydl suppository in addition for further relief     Nausea (improved)  - Continue Ondansetron 8mg [id number] PRN  - Continue Lorazepam 0.5mg, 1/2-1 tab q8 hours PRN     Altered Mood  Chronic anxiety and depression related to hx of bipolar disorder .  Home regimen:   - Managed by outside provider - appreciate recs  - Following with trauma counselor  - Pursuing acupuncture/massage  - Continue Lamictal 75mg daily  - Continue Sertraline 150mg daily  - Continue Lorazepam 0.5mg, 1/2 to 1 tablet [id number] PRN for anxiety  - Continue Abilify as prescribed by psychiatrist     Supportive and Palliative Oncology Encounter:  Emotional support provided  Coordination of  care  Will continue to follow and address symptoms as needed    Next Follow-Up Visit:  Return to clinic in 3 months in person    Signature and billing  Medical complexity was moderate level due to due to complexity of problems, extensive data review, and high risk of management/treatment.  Time was spent on the following: Prep Time, Time Directly with Patient/Family/Caregiver, Documentation Time. Total time spent: 30      Data  Some elements copied from Palliative Care note on 12/29/23, the elements have been updated and all reflect current decision making from today, 2/16/2024.      Plan of Care discussed with: Patient    SIGNATURE: SAVANAH Maldonado    Contact information:  Supportive and Palliative Oncology  Monday-Friday 8 AM-5 PM  Phone:  [phone], press option #5, then option #1.   Or Epic Secure Chat

## 2024-02-23 ENCOUNTER — APPOINTMENT (OUTPATIENT)
Dept: HEMATOLOGY/ONCOLOGY | Facility: CLINIC | Age: 44
End: 2024-02-23
Payer: COMMERCIAL

## 2024-03-05 NOTE — PROGRESS NOTES
"Subjective   Chief Complaint   Patient presents with    Follow-up     Isaias is here today with c/o hair thinning and falling out, no energy, brain fog, difficulty losing weight x 6 months.        Patient ID: Isaias Chamorro is a 43 y.o. female who presents for Follow-up (Isaias is here today with c/o hair thinning and falling out, no energy, brain fog, difficulty losing weight x 6 months. ).    HPI  Isaias is an est 42 yo female presenting today to discuss her thyroid level and ? Associated symptoms     Pt c/o ongoing hair loss and is very frustrated  Onset: 6 months  Has tried hair herbs and Minoxidil x 6 months and no improvement  Would like to try Spironolactone  Her last TSH was 6.22 in Aug 2023, will recheck and tx as indicated   Pt is also asking if she should try Cytomel ---> will check T3    Pt is seeing therapist 2 days/month   Very tearful today   Frustrated with her weight and hair loss     Review of Systems  All 13 systems were reviewed and are within normal limits except positive and pertinent negative responses which are noted below or in HPI.      Objective   /82   Pulse 86   Ht 1.486 m (4' 10.5\")   Wt 95.7 kg (211 lb)   SpO2 96%   BMI 43.35 kg/m²      Current Outpatient Medications   Medication Instructions    albuterol 90 mcg/actuation inhaler inhalation    ARIPiprazole (Abilify) 5 mg tablet 0.5 tablets, oral, Daily    ascorbic acid (VITAMIN C) 100 mg, oral, Daily    biotin 10,000 mcg capsule 1 capsule, oral, Daily    budesonide (Rhinocort AQ) 32 mcg/actuation nasal spray nasal    calcium cit-mag-D3-Zn--maxine (Calcium Citrate Plus) 882--3.75 mg-mg-unit-mg tablet 1 tablet, oral, Daily    cholecalciferol (Vitamin D3) 5,000 Units tablet 1 tablet (5,000 Units) once daily.    cyanocobalamin (Vitamin B-12) 1,000 mcg tablet 1 tablet, oral, Daily    ferrous sulfate 65 mg, oral, Daily    fexofenadine (Allegra) 180 mg tablet oral    lamoTRIgine (LaMICtal) 100 mg tablet 1 tablet, " oral, Daily    levonorgestrel (Mirena) 21 mcg/24 hours (8 yrs) 52 mg IUD Intrauterine    levothyroxine (Synthroid, Levoxyl) 200 mcg tablet TAKE 1.5 TABLETS BY MOUTH ON SUNDAY AND 1 TABLET BY MOUTH MONDAY-SATURDAY    LORazepam (ATIVAN) 0.5 mg, oral, 3 times daily PRN    methadone (Dolophine) 5 mg tablet Take 0.5 tablets (2.5 mg) by mouth once daily in the morning AND 1 tablet (5 mg) once daily at bedtime.    montelukast (Singulair) 10 mg tablet 1 tablet, oral, Nightly    ondansetron (ZOFRAN) 8 mg, oral, 3 times daily PRN    oxyCODONE (OXY-IR) 5 mg, oral, As needed    polyethylene glycol (Miralax) 17 gram packet oral    sertraline (Zoloft) 100 mg tablet 1 tablet, oral, Daily    tiZANidine (ZANAFLEX) 2 mg, oral, Every 6 hours PRN    Topamax 100 mg, oral, 3 times daily     Lab on 12/29/2023   Component Date Value Ref Range Status    WBC 12/29/2023 3.5 (L)  4.4 - 11.3 x10*3/uL Final    nRBC 12/29/2023 0.0  0.0 - 0.0 /100 WBCs Final    RBC 12/29/2023 4.32  4.00 - 5.20 x10*6/uL Final    Hemoglobin 12/29/2023 11.8 (L)  12.0 - 16.0 g/dL Final    Hematocrit 12/29/2023 35.6 (L)  36.0 - 46.0 % Final    MCV 12/29/2023 82  80 - 100 fL Final    MCH 12/29/2023 27.3  26.0 - 34.0 pg Final    MCHC 12/29/2023 33.1  32.0 - 36.0 g/dL Final    RDW 12/29/2023 14.0  11.5 - 14.5 % Final    Platelets 12/29/2023 196  150 - 450 x10*3/uL Final    Neutrophils % 12/29/2023 63.8  40.0 - 80.0 % Final    Immature Granulocytes %, Automated 12/29/2023 0.3  0.0 - 0.9 % Final    Immature Granulocyte Count (IG) includes promyelocytes, myelocytes and metamyelocytes but does not include bands. Percent differential counts (%) should be interpreted in the context of the absolute cell counts (cells/UL).    Lymphocytes % 12/29/2023 26.5  13.0 - 44.0 % Final    Monocytes % 12/29/2023 7.7  2.0 - 10.0 % Final    Eosinophils % 12/29/2023 1.4  0.0 - 6.0 % Final    Basophils % 12/29/2023 0.3  0.0 - 2.0 % Final    Neutrophils Absolute 12/29/2023 2.24  1.20 - 7.70  x10*3/uL Final    Percent differential counts (%) should be interpreted in the context of the absolute cell counts (cells/uL).    Immature Granulocytes Absolute, Au* 12/29/2023 0.01  0.00 - 0.70 x10*3/uL Final    Lymphocytes Absolute 12/29/2023 0.93 (L)  1.20 - 4.80 x10*3/uL Final    Monocytes Absolute 12/29/2023 0.27  0.10 - 1.00 x10*3/uL Final    Eosinophils Absolute 12/29/2023 0.05  0.00 - 0.70 x10*3/uL Final    Basophils Absolute 12/29/2023 0.01  0.00 - 0.10 x10*3/uL Final    Glucose 12/29/2023 105 (H)  74 - 99 mg/dL Final    Sodium 12/29/2023 140  136 - 145 mmol/L Final    Potassium 12/29/2023 3.5  3.5 - 5.3 mmol/L Final    Chloride 12/29/2023 107  98 - 107 mmol/L Final    Bicarbonate 12/29/2023 24  21 - 32 mmol/L Final    Anion Gap 12/29/2023 13  10 - 20 mmol/L Final    Urea Nitrogen 12/29/2023 9  6 - 23 mg/dL Final    Creatinine 12/29/2023 0.54  0.50 - 1.05 mg/dL Final    eGFR 12/29/2023 >90  >60 mL/min/1.73m*2 Final    Calculations of estimated GFR are performed using the 2021 CKD-EPI Study Refit equation without the race variable for the IDMS-Traceable creatinine methods.  https://jasn.asnjournals.org/content/early/2021/09/22/ASN.2509892176    Calcium 12/29/2023 8.7  8.6 - 10.3 mg/dL Final    Albumin 12/29/2023 4.1  3.4 - 5.0 g/dL Final    Alkaline Phosphatase 12/29/2023 76  33 - 110 U/L Final    Total Protein 12/29/2023 7.2  6.4 - 8.2 g/dL Final    AST 12/29/2023 24  9 - 39 U/L Final    Bilirubin, Total 12/29/2023 0.5  0.0 - 1.2 mg/dL Final    ALT 12/29/2023 20  7 - 45 U/L Final    Patients treated with Sulfasalazine may generate falsely decreased results for ALT.    LDH 12/29/2023 193  84 - 246 U/L Final    Uric Acid 12/29/2023 4.3  2.3 - 6.7 mg/dL Final    Venipuncture immediately after or during the administration of Metamizole may lead to falsely low results. Testing should be performed immediately  prior to Metamizole dosing.    Magnesium 12/29/2023 2.00  1.60 - 2.40 mg/dL Final   Lab on 08/26/2023    Component Date Value Ref Range Status    TSH 08/26/2023 6.22 (H)  0.44 - 3.98 mIU/L Final     TSH testing is performed using different testing    methodology at [facility] than at other    [facility]. Direct result comparisons should    only be made within the same method.    Free T4 08/26/2023 0.85  0.61 - 1.12 ng/dL Final     Thyroxine Free testing is performed using different testing    methodology at [facility] than at other    [facility]. Direct result comparisons should    only be made within the same method.  .   Biotin can cause falsely elevated free T4 results. Patients   taking a Biotin dose of up to 10 mg/day should refrain from   taking Biotin for 24 hours before sample collection. Patient   taking a Biotin dose of >10 mg/day should consult with their   physician or the laboratory before the blood draw.   [facility] Encounter on 07/21/2023   Component Date Value Ref Range Status    Glucose 07/21/2023 119 (H)  74 - 99 mg/dL Final    Sodium 07/21/2023 140  136 - 145 mmol/L Final    Potassium 07/21/2023 3.4 (L)  3.5 - 5.3 mmol/L Final    Chloride 07/21/2023 109 (H)  98 - 107 mmol/L Final    Bicarbonate 07/21/2023 23  21 - 32 mmol/L Final    Anion Gap 07/21/2023 11  10 - 20 mmol/L Final    Urea Nitrogen 07/21/2023 14  6 - 23 mg/dL Final    Creatinine 07/21/2023 0.63  0.50 - 1.05 mg/dL Final    GFR Female 07/21/2023 >90  >90 mL/min/1.73m2 Final    Comment:  CALCULATIONS OF ESTIMATED GFR ARE PERFORMED   USING THE 2021 CKD-EPI STUDY REFIT EQUATION   WITHOUT THE RACE VARIABLE FOR THE IDMS-TRACEABLE   CREATININE METHODS.    https://jasn.asnjournals.org/content/early/2021/09/22/ASN.8585619411      Calcium 07/21/2023 8.8  8.6 - 10.3 mg/dL Final    Albumin 07/21/2023 4.2  3.4 - 5.0 g/dL Final    Alkaline Phosphatase 07/21/2023 95  33 - 110 U/L Final    Total Protein 07/21/2023 7.0  6.4 - 8.2 g/dL Final    AST 07/21/2023 17  9 - 39 U/L Final    Total Bilirubin 07/21/2023 0.5  0.0  - 1.2 mg/dL Final    ALT (SGPT) 07/21/2023 12  7 - 45 U/L Final    Comment:  Patients treated with Sulfasalazine may generate    falsely decreased results for ALT.      WBC 07/21/2023 4.9  4.4 - 11.3 x10E9/L Final    RBC 07/21/2023 4.54  4.00 - 5.20 x10E12/L Final    Hemoglobin 07/21/2023 12.1  12.0 - 16.0 g/dL Final    Hematocrit 07/21/2023 36.6  36.0 - 46.0 % Final    MCV 07/21/2023 81  80 - 100 fL Final    MCHC 07/21/2023 33.1  32.0 - 36.0 g/dL Final    Platelets 07/21/2023 194  150 - 450 x10E9/L Final    RDW 07/21/2023 16.7 (H)  11.5 - 14.5 % Final    Neutrophils % 07/21/2023 54.8  40.0 - 80.0 % Final    Immature Granulocytes %, Automated 07/21/2023 0.2  0.0 - 0.9 % Final    Comment:  Immature Granulocyte Count (IG) includes promyelocytes,    myelocytes and metamyelocytes but does not include bands.   Percent differential counts (%) should be interpreted in the   context of the absolute cell counts (cells/L).      Lymphocytes % 07/21/2023 32.3  13.0 - 44.0 % Final    Monocytes % 07/21/2023 8.7  2.0 - 10.0 % Final    Eosinophils % 07/21/2023 2.8  0.0 - 6.0 % Final    Basophils % 07/21/2023 1.2  0.0 - 2.0 % Final    Neutrophils Absolute 07/21/2023 2.70  1.20 - 7.70 x10E9/L Final    Lymphocytes Absolute 07/21/2023 1.59  1.20 - 4.80 x10E9/L Final    Monocytes Absolute 07/21/2023 0.43  0.10 - 1.00 x10E9/L Final    Eosinophils Absolute 07/21/2023 0.14  0.00 - 0.70 x10E9/L Final    Basophils Absolute 07/21/2023 0.06  0.00 - 0.10 x10E9/L Final         Physical Exam  Vitals reviewed.   Cardiovascular:      Pulses: Normal pulses.      Heart sounds: Normal heart sounds.   Pulmonary:      Effort: Pulmonary effort is normal.      Breath sounds: Normal breath sounds.   Neurological:      Mental Status: She is alert.   Psychiatric:         Mood and Affect: Mood is depressed. Affect is tearful.         Assessment/Plan   Problem List Items Addressed This Visit             ICD-10-CM    Bipolar affective disorder (CMS/[facility]) F31.9     HLD (hyperlipidemia) E78.5    Relevant Orders    Lipid Panel    Hypothyroidism - Primary E03.9    Relevant Orders    TSH with reflex to Free T4 if abnormal    T3, free    Mild intermittent asthma J45.20     Other Visit Diagnoses         Codes    BMI 40.0-44.9, adult (CMS/[facility])     Z68.41    Relevant Orders    Hemoglobin A1C    Comprehensive Metabolic Panel

## 2024-03-06 ENCOUNTER — OFFICE VISIT (OUTPATIENT)
Dept: PRIMARY CARE | Facility: CLINIC | Age: 44
End: 2024-03-06
Payer: COMMERCIAL

## 2024-03-06 VITALS
HEIGHT: 59 IN | OXYGEN SATURATION: 96 % | SYSTOLIC BLOOD PRESSURE: 117 MMHG | DIASTOLIC BLOOD PRESSURE: 82 MMHG | BODY MASS INDEX: 42.54 KG/M2 | WEIGHT: 211 LBS | HEART RATE: 86 BPM

## 2024-03-06 DIAGNOSIS — J45.20 MILD INTERMITTENT ASTHMA, UNSPECIFIED WHETHER COMPLICATED (HHS-HCC): ICD-10-CM

## 2024-03-06 DIAGNOSIS — F31.32 BIPOLAR AFFECTIVE DISORDER, CURRENTLY DEPRESSED, MODERATE (MULTI): ICD-10-CM

## 2024-03-06 DIAGNOSIS — E03.9 ACQUIRED HYPOTHYROIDISM: Primary | ICD-10-CM

## 2024-03-06 DIAGNOSIS — E78.2 MIXED HYPERLIPIDEMIA: ICD-10-CM

## 2024-03-06 PROBLEM — J98.4 DRUG-INDUCED PNEUMONITIS: Status: RESOLVED | Noted: 2023-09-12 | Resolved: 2024-03-06

## 2024-03-06 PROBLEM — Z99.81 HYPOXEMIA REQUIRING SUPPLEMENTAL OXYGEN: Status: RESOLVED | Noted: 2023-09-12 | Resolved: 2024-03-06

## 2024-03-06 PROBLEM — I42.9 MYOCARDIOPATHY (MULTI): Status: RESOLVED | Noted: 2023-09-12 | Resolved: 2024-03-06

## 2024-03-06 PROBLEM — J84.89 ORGANIZING PNEUMONIA (MULTI): Status: RESOLVED | Noted: 2023-09-12 | Resolved: 2024-03-06

## 2024-03-06 PROBLEM — R09.02 HYPOXEMIA REQUIRING SUPPLEMENTAL OXYGEN: Status: RESOLVED | Noted: 2023-09-12 | Resolved: 2024-03-06

## 2024-03-06 PROBLEM — T50.905A DRUG-INDUCED PNEUMONITIS: Status: RESOLVED | Noted: 2023-09-12 | Resolved: 2024-03-06

## 2024-03-06 PROBLEM — R09.02 HYPOXIA: Status: RESOLVED | Noted: 2023-09-12 | Resolved: 2024-03-06

## 2024-03-06 PROBLEM — E66.812 CLASS 2 OBESITY WITH BODY MASS INDEX (BMI) OF 36.0 TO 36.9 IN ADULT: Status: RESOLVED | Noted: 2023-09-12 | Resolved: 2024-03-06

## 2024-03-06 PROBLEM — J96.00 ACUTE RESPIRATORY FAILURE (MULTI): Status: RESOLVED | Noted: 2023-09-12 | Resolved: 2024-03-06

## 2024-03-06 PROBLEM — Z99.81 ON HOME OXYGEN THERAPY: Status: RESOLVED | Noted: 2023-09-12 | Resolved: 2024-03-06

## 2024-03-06 PROBLEM — E66.9 CLASS 2 OBESITY WITH BODY MASS INDEX (BMI) OF 36.0 TO 36.9 IN ADULT: Status: RESOLVED | Noted: 2023-09-12 | Resolved: 2024-03-06

## 2024-03-06 PROBLEM — E11.9 DIABETES MELLITUS WITHOUT COMPLICATION (MULTI): Status: RESOLVED | Noted: 2023-09-12 | Resolved: 2024-03-06

## 2024-03-06 PROBLEM — J96.91 RESPIRATORY FAILURE WITH HYPOXIA (MULTI): Status: RESOLVED | Noted: 2023-09-12 | Resolved: 2024-03-06

## 2024-03-06 PROBLEM — R93.89 ABNORMAL COMPUTERIZED AXIAL TOMOGRAPHY OF CHEST: Status: RESOLVED | Noted: 2023-09-12 | Resolved: 2024-03-06

## 2024-03-06 PROCEDURE — 3008F BODY MASS INDEX DOCD: CPT | Performed by: NURSE PRACTITIONER

## 2024-03-06 PROCEDURE — 1036F TOBACCO NON-USER: CPT | Performed by: NURSE PRACTITIONER

## 2024-03-06 PROCEDURE — 99213 OFFICE O/P EST LOW 20 MIN: CPT | Performed by: NURSE PRACTITIONER

## 2024-03-06 NOTE — PATIENT INSTRUCTIONS
Thank you for seeing me today.  It was a pleasure to see you again!    #HAIR LOSS/ABNORMAL TSH  Please get TSH drawn---> stop taking biotin 5 days before getting blood drawn  We can consider Spironolactone once your thyroid is regulated.     Will call to discuss results.

## 2024-03-06 NOTE — ASSESSMENT & PLAN NOTE
Condition stable based on symptoms and exam.    Continue established treatment plan and follow-up at least yearly.  Followed by Oncology

## 2024-03-12 ENCOUNTER — LAB (OUTPATIENT)
Dept: LAB | Facility: LAB | Age: 44
End: 2024-03-12
Payer: COMMERCIAL

## 2024-03-12 DIAGNOSIS — E78.2 MIXED HYPERLIPIDEMIA: ICD-10-CM

## 2024-03-12 DIAGNOSIS — E03.9 ACQUIRED HYPOTHYROIDISM: ICD-10-CM

## 2024-03-12 LAB
ALBUMIN SERPL BCP-MCNC: 4 G/DL (ref 3.4–5)
ALP SERPL-CCNC: 96 U/L (ref 33–110)
ALT SERPL W P-5'-P-CCNC: 22 U/L (ref 7–45)
ANION GAP SERPL CALC-SCNC: 11 MMOL/L (ref 10–20)
AST SERPL W P-5'-P-CCNC: 20 U/L (ref 9–39)
BILIRUB SERPL-MCNC: 0.4 MG/DL (ref 0–1.2)
BUN SERPL-MCNC: 19 MG/DL (ref 6–23)
CALCIUM SERPL-MCNC: 8.9 MG/DL (ref 8.6–10.3)
CHLORIDE SERPL-SCNC: 108 MMOL/L (ref 98–107)
CHOLEST SERPL-MCNC: 183 MG/DL (ref 0–199)
CHOLESTEROL/HDL RATIO: 3.1
CO2 SERPL-SCNC: 27 MMOL/L (ref 21–32)
CREAT SERPL-MCNC: 0.72 MG/DL (ref 0.5–1.05)
EGFRCR SERPLBLD CKD-EPI 2021: >90 ML/MIN/1.73M*2
EST. AVERAGE GLUCOSE BLD GHB EST-MCNC: 108 MG/DL
GLUCOSE SERPL-MCNC: 93 MG/DL (ref 74–99)
HBA1C MFR BLD: 5.4 %
HDLC SERPL-MCNC: 59.7 MG/DL
LDLC SERPL CALC-MCNC: 99 MG/DL
NON HDL CHOLESTEROL: 123 MG/DL (ref 0–149)
POTASSIUM SERPL-SCNC: 3.8 MMOL/L (ref 3.5–5.3)
PROT SERPL-MCNC: 7 G/DL (ref 6.4–8.2)
SODIUM SERPL-SCNC: 142 MMOL/L (ref 136–145)
T3FREE SERPL-MCNC: 3.2 PG/ML (ref 2.3–4.2)
T4 FREE SERPL-MCNC: 1 NG/DL (ref 0.61–1.12)
TRIGL SERPL-MCNC: 123 MG/DL (ref 0–149)
TSH SERPL-ACNC: 0.4 MIU/L (ref 0.44–3.98)
VLDL: 25 MG/DL (ref 0–40)

## 2024-03-12 PROCEDURE — 84481 FREE ASSAY (FT-3): CPT

## 2024-03-12 PROCEDURE — 83036 HEMOGLOBIN GLYCOSYLATED A1C: CPT

## 2024-03-14 ENCOUNTER — TELEPHONE (OUTPATIENT)
Dept: PRIMARY CARE | Facility: CLINIC | Age: 44
End: 2024-03-14
Payer: COMMERCIAL

## 2024-03-14 DIAGNOSIS — E03.9 ACQUIRED HYPOTHYROIDISM: ICD-10-CM

## 2024-03-14 DIAGNOSIS — R79.89 ABNORMAL TSH: Primary | ICD-10-CM

## 2024-03-14 RX ORDER — LEVOTHYROXINE SODIUM 200 UG/1
200 TABLET ORAL
Qty: 90 TABLET | Refills: 3 | Status: SHIPPED | OUTPATIENT
Start: 2024-03-14

## 2024-03-14 NOTE — TELEPHONE ENCOUNTER
Spoke with pt via phone regarding all lab results   Aware of TSH result  Will cut back Levothyroxine to 200 mcg x 7 days (was taking 300 mcg on Sunday only)  TSH in 6 weeks.    May consider Spironolactone for hair loss once Thyroid is regulated     Lab on 03/12/2024   Component Date Value Ref Range Status    Thyroid Stimulating Hormone 03/12/2024 0.40 (L)  0.44 - 3.98 mIU/L Final    Triiodothyronine, Free 03/12/2024 3.2  2.3 - 4.2 pg/mL Final    Hemoglobin A1C 03/12/2024 5.4  see below % Final    Estimated Average Glucose 03/12/2024 108  Not Established mg/dL Final    Cholesterol 03/12/2024 183  0 - 199 mg/dL Final          Age      Desirable   Borderline High   High     0-19 Y     0 - 169       170 - 199     >/= 200    20-24 Y     0 - 189       190 - 224     >/= 225         >24 Y     0 - 199       200 - 239     >/= 240   **All ranges are based on fasting samples. Specific   therapeutic targets will vary based on patient-specific   cardiac risk.    Pediatric guidelines reference:Pediatrics 2011, 128(S5).Adult guidelines reference: NCEP ATPIII Guidelines,LIDYA 2001, 258:2486-97    Venipuncture immediately after or during the administration of Metamizole may lead to falsely low results. Testing should be performed immediately prior to Metamizole dosing.    HDL-Cholesterol 03/12/2024 59.7  mg/dL Final      Age       Very Low   Low     Normal    High    0-19 Y    < 35      < 40     40-45     ----  20-24 Y    ----     < 40      >45      ----        >24 Y      ----     < 40     40-60      >60      Cholesterol/HDL Ratio 03/12/2024 3.1   Final      Ref Values  Desirable  < 3.4  High Risk  > 5.0    LDL Calculated 03/12/2024 99  <=99 mg/dL Final                                Near   Borderline      AGE      Desirable  Optimal    High     High     Very High     0-19 Y     0 - 109     ---    110-129   >/= 130     ----    20-24 Y     0 - 119     ---    120-159   >/= 160     ----      >24 Y     0 -  99   100-129  130-159   160-189      >/=190      VLDL 03/12/2024 25  0 - 40 mg/dL Final    Triglycerides 03/12/2024 123  0 - 149 mg/dL Final       Age         Desirable   Borderline High   High     Very High   0 D-90 D    19 - 174         ----         ----        ----  91 D- 9 Y     0 -  74        75 -  99     >/= 100      ----    10-19 Y     0 -  89        90 - 129     >/= 130      ----    20-24 Y     0 - 114       115 - 149     >/= 150      ----         >24 Y     0 - 149       150 - 199    200- 499    >/= 500    Venipuncture immediately after or during the administration of Metamizole may lead to falsely low results. Testing should be performed immediately prior to Metamizole dosing.    Non HDL Cholesterol 03/12/2024 123  0 - 149 mg/dL Final          Age       Desirable   Borderline High   High     Very High     0-19 Y     0 - 119       120 - 144     >/= 145    >/= 160    20-24 Y     0 - 149       150 - 189     >/= 190      ----         >24 Y    30 mg/dL above LDL Cholesterol goal      Glucose 03/12/2024 93  74 - 99 mg/dL Final    Sodium 03/12/2024 142  136 - 145 mmol/L Final    Potassium 03/12/2024 3.8  3.5 - 5.3 mmol/L Final    Chloride 03/12/2024 108 (H)  98 - 107 mmol/L Final    Bicarbonate 03/12/2024 27  21 - 32 mmol/L Final    Anion Gap 03/12/2024 11  10 - 20 mmol/L Final    Urea Nitrogen 03/12/2024 19  6 - 23 mg/dL Final    Creatinine 03/12/2024 0.72  0.50 - 1.05 mg/dL Final    eGFR 03/12/2024 >90  >60 mL/min/1.73m*2 Final    Calculations of estimated GFR are performed using the 2021 CKD-EPI Study Refit equation without the race variable for the IDMS-Traceable creatinine methods.  https://jasn.asnjournals.org/content/early/2021/09/22/ASN.9479977842    Calcium 03/12/2024 8.9  8.6 - 10.3 mg/dL Final    Albumin 03/12/2024 4.0  3.4 - 5.0 g/dL Final    Alkaline Phosphatase 03/12/2024 96  33 - 110 U/L Final    Total Protein 03/12/2024 7.0  6.4 - 8.2 g/dL Final    AST 03/12/2024 20  9 - 39 U/L Final    Bilirubin, Total 03/12/2024 0.4  0.0 - 1.2  mg/dL Final    ALT 03/12/2024 22  7 - 45 U/L Final    Patients treated with Sulfasalazine may generate falsely decreased results for ALT.    Thyroxine, Free 03/12/2024 1.00  0.61 - 1.12 ng/dL Final

## 2024-03-15 ENCOUNTER — TELEPHONE (OUTPATIENT)
Dept: OBSTETRICS AND GYNECOLOGY | Facility: CLINIC | Age: 44
End: 2024-03-15
Payer: COMMERCIAL

## 2024-03-15 DIAGNOSIS — E66.01 CLASS 3 SEVERE OBESITY DUE TO EXCESS CALORIES WITH SERIOUS COMORBIDITY AND BODY MASS INDEX (BMI) OF 40.0 TO 44.9 IN ADULT (MULTI): ICD-10-CM

## 2024-03-26 ENCOUNTER — TELEPHONE (OUTPATIENT)
Dept: ADMISSION | Facility: HOSPITAL | Age: 44
End: 2024-03-26
Payer: COMMERCIAL

## 2024-03-26 DIAGNOSIS — G89.3 CHRONIC PAIN DUE TO NEOPLASM: ICD-10-CM

## 2024-03-26 RX ORDER — METHADONE HYDROCHLORIDE 5 MG/1
TABLET ORAL
Qty: 45 TABLET | Refills: 0 | Status: SHIPPED | OUTPATIENT
Start: 2024-03-26 | End: 2024-04-29 | Stop reason: SDUPTHER

## 2024-03-29 ENCOUNTER — OFFICE VISIT (OUTPATIENT)
Dept: HEMATOLOGY/ONCOLOGY | Facility: HOSPITAL | Age: 44
End: 2024-03-29
Payer: COMMERCIAL

## 2024-03-29 ENCOUNTER — LAB (OUTPATIENT)
Dept: HEMATOLOGY/ONCOLOGY | Facility: HOSPITAL | Age: 44
End: 2024-03-29
Payer: COMMERCIAL

## 2024-03-29 VITALS
BODY MASS INDEX: 44.79 KG/M2 | DIASTOLIC BLOOD PRESSURE: 81 MMHG | RESPIRATION RATE: 18 BRPM | HEART RATE: 87 BPM | OXYGEN SATURATION: 99 % | WEIGHT: 218.03 LBS | SYSTOLIC BLOOD PRESSURE: 122 MMHG | TEMPERATURE: 97 F

## 2024-03-29 DIAGNOSIS — C81.90 HODGKIN LYMPHOMA, UNSPECIFIED HODGKIN LYMPHOMA TYPE, UNSPECIFIED BODY REGION (MULTI): ICD-10-CM

## 2024-03-29 DIAGNOSIS — M79.7 FIBROMYALGIA: ICD-10-CM

## 2024-03-29 DIAGNOSIS — F31.70 BIPOLAR DISORDER IN FULL REMISSION, MOST RECENT EPISODE UNSPECIFIED TYPE (MULTI): Primary | ICD-10-CM

## 2024-03-29 LAB
ALBUMIN SERPL BCP-MCNC: 3.8 G/DL (ref 3.4–5)
ALP SERPL-CCNC: 89 U/L (ref 33–110)
ALT SERPL W P-5'-P-CCNC: 20 U/L (ref 7–45)
ANION GAP SERPL CALC-SCNC: 11 MMOL/L (ref 10–20)
AST SERPL W P-5'-P-CCNC: 20 U/L (ref 9–39)
BASOPHILS # BLD AUTO: 0.02 X10*3/UL (ref 0–0.1)
BASOPHILS NFR BLD AUTO: 0.3 %
BILIRUB SERPL-MCNC: 0.4 MG/DL (ref 0–1.2)
BUN SERPL-MCNC: 17 MG/DL (ref 6–23)
CALCIUM SERPL-MCNC: 8.3 MG/DL (ref 8.6–10.3)
CHLORIDE SERPL-SCNC: 108 MMOL/L (ref 98–107)
CO2 SERPL-SCNC: 24 MMOL/L (ref 21–32)
CREAT SERPL-MCNC: 0.65 MG/DL (ref 0.5–1.05)
EGFRCR SERPLBLD CKD-EPI 2021: >90 ML/MIN/1.73M*2
EOSINOPHIL # BLD AUTO: 0.14 X10*3/UL (ref 0–0.7)
EOSINOPHIL NFR BLD AUTO: 2.3 %
ERYTHROCYTE [DISTWIDTH] IN BLOOD BY AUTOMATED COUNT: 14.7 % (ref 11.5–14.5)
GLUCOSE SERPL-MCNC: 102 MG/DL (ref 74–99)
HCT VFR BLD AUTO: 36 % (ref 36–46)
HGB BLD-MCNC: 12 G/DL (ref 12–16)
IMM GRANULOCYTES # BLD AUTO: 0.01 X10*3/UL (ref 0–0.7)
IMM GRANULOCYTES NFR BLD AUTO: 0.2 % (ref 0–0.9)
LDH SERPL L TO P-CCNC: 152 U/L (ref 84–246)
LYMPHOCYTES # BLD AUTO: 1.24 X10*3/UL (ref 1.2–4.8)
LYMPHOCYTES NFR BLD AUTO: 20 %
MAGNESIUM SERPL-MCNC: 1.78 MG/DL (ref 1.6–2.4)
MCH RBC QN AUTO: 27.8 PG (ref 26–34)
MCHC RBC AUTO-ENTMCNC: 33.3 G/DL (ref 32–36)
MCV RBC AUTO: 83 FL (ref 80–100)
MONOCYTES # BLD AUTO: 0.47 X10*3/UL (ref 0.1–1)
MONOCYTES NFR BLD AUTO: 7.6 %
NEUTROPHILS # BLD AUTO: 4.31 X10*3/UL (ref 1.2–7.7)
NEUTROPHILS NFR BLD AUTO: 69.6 %
NRBC BLD-RTO: 0 /100 WBCS (ref 0–0)
PLATELET # BLD AUTO: 199 X10*3/UL (ref 150–450)
POTASSIUM SERPL-SCNC: 3.9 MMOL/L (ref 3.5–5.3)
PROT SERPL-MCNC: 6.4 G/DL (ref 6.4–8.2)
RBC # BLD AUTO: 4.32 X10*6/UL (ref 4–5.2)
SODIUM SERPL-SCNC: 139 MMOL/L (ref 136–145)
URATE SERPL-MCNC: 5.7 MG/DL (ref 2.3–6.7)
WBC # BLD AUTO: 6.2 X10*3/UL (ref 4.4–11.3)

## 2024-03-29 PROCEDURE — 85025 COMPLETE CBC W/AUTO DIFF WBC: CPT

## 2024-03-29 PROCEDURE — 1036F TOBACCO NON-USER: CPT | Performed by: NURSE PRACTITIONER

## 2024-03-29 PROCEDURE — 83615 LACTATE (LD) (LDH) ENZYME: CPT

## 2024-03-29 PROCEDURE — 82435 ASSAY OF BLOOD CHLORIDE: CPT

## 2024-03-29 PROCEDURE — 99215 OFFICE O/P EST HI 40 MIN: CPT | Performed by: NURSE PRACTITIONER

## 2024-03-29 PROCEDURE — 3008F BODY MASS INDEX DOCD: CPT | Performed by: NURSE PRACTITIONER

## 2024-03-29 PROCEDURE — 36591 DRAW BLOOD OFF VENOUS DEVICE: CPT

## 2024-03-29 PROCEDURE — 84550 ASSAY OF BLOOD/URIC ACID: CPT

## 2024-03-29 PROCEDURE — 2500000004 HC RX 250 GENERAL PHARMACY W/ HCPCS (ALT 636 FOR OP/ED): Performed by: NURSE PRACTITIONER

## 2024-03-29 PROCEDURE — 83735 ASSAY OF MAGNESIUM: CPT

## 2024-03-29 RX ORDER — HEPARIN 100 UNIT/ML
500 SYRINGE INTRAVENOUS AS NEEDED
Status: DISCONTINUED | OUTPATIENT
Start: 2024-03-29 | End: 2024-03-29 | Stop reason: HOSPADM

## 2024-03-29 RX ORDER — HEPARIN SODIUM,PORCINE/PF 10 UNIT/ML
50 SYRINGE (ML) INTRAVENOUS AS NEEDED
OUTPATIENT
Start: 2024-03-29

## 2024-03-29 RX ORDER — HEPARIN 100 UNIT/ML
500 SYRINGE INTRAVENOUS AS NEEDED
OUTPATIENT
Start: 2024-03-29

## 2024-03-29 RX ADMIN — HEPARIN 500 UNITS: 100 SYRINGE at 11:21

## 2024-03-29 ASSESSMENT — ENCOUNTER SYMPTOMS
FATIGUE: 1
NERVOUS/ANXIOUS: 1
COUGH: 1
BACK PAIN: 1
CONSTIPATION: 1
SHORTNESS OF BREATH: 1
SLEEP DISTURBANCE: 1
DEPRESSION: 1
FREQUENCY: 1
LEG SWELLING: 1

## 2024-03-29 ASSESSMENT — PAIN SCALES - GENERAL: PAINLEVEL: 3

## 2024-03-29 NOTE — PROGRESS NOTES
Patient ID:  Isaias Chamorro is a 43 y.o. female.  Referring Physician:   ONC Dr Webber  Primary Care Provider:  MORRO Jacobson-CNP    Assessment/Plan      3/29/24 Increased fatigue, sweats, hot flashes, tender neck. Doesn't feel well. Restaging PET scan. FSH, LH and estradiol today. Upcoming appt with GYN.    Oncology History    No history exists.        Problem List Items Addressed This Visit             ICD-10-CM    Bipolar affective disorder (CMS/HCC) - Primary F31.9     Seeing Dr. Laguerre  Rx Abilify 2.5 mg, Lamictal 100 mg and Sertraline 100 mg daily          Hodgkins lymphoma (CMS/HCC) C81.90     Hodgkin Lymphoma Diagnosis:  - stage IIB HL, dx 6/2013     ABVD:  - s/p ABVD x6 cycles with persistent disease noted in November 2013     BR:  - Enrolled on clinical trial SEGE-1412 with bendamustine and rituximab for 2 cycles  - PET-CT in March 2014 noted a CR     aSCT:  - s/p stem cell mobilization and collection:  collected 3.55 x10(6) CD34 cells per kg.   - s/p aSCT with BEAM preparative regimen  on April 15, 2014. Hospitalization complicated by neutropenic fever, pneumonia, menstrual bleeding, abdominal pain, nausea, vomiting, diarrhea, transaminitis secondary to medications, and electrolyte abnormalities.      Maintenance:  - s/p maintenance Brentuximab per clinical  research trial [id number], given on day 1 of each 21 day cycle.    Completed 16 cycles with CR by PET.     Relapse:  - She had imaging in July 2015 that suggested a possible recurrence. This was histologically confirmed by resection of a mediastinal mass by Dr. Oreilly.      Radiation:  - She began radiation therapy on 9/3/15 which completed on 10/6/15     Relapse:  - Admitted to [facility] (2/26/21) for weakness in left upper extremity.  MRI (2/23/21) demonstrated a tumor in the left paraspinous region and brachial plexus partially filling the C7-T1 neural foramen  (no cord compression), and enlarged left supraclavicular nodes.  Ortho/Spine consulted on admit, no surgical intervention needed. PET scan (3/1/21) demonstrated left paraspinal mass, left supraclavicular node, several cervical LN. Patient underwent a core  biopsy x 3 of the left supraclavicular node, confirmed Classical Hodgkin Lymphoma relapse. Patient was started on Prednisone 50mg daily      Pembro:  - Salvage pembrolizumab given 3/23, 4/13, 5/4, 5/25, 6/18, 7/9/2021  - EOT PET showed Deauville 1 response.  Discussed in tumor board on 8/19/21, reviewed with rad onc; appears that her relapse overlapped some of her prior radiation ports.  Recommended consideration of consolidative radiation.     Radiation:  - 19 fractions planned to recurrence site started in early Oct, 2021 (10/19/21-11/11/21)  - EOT PET 11/29/21 - deauville 1     Maintenance:  - Pembro q3w beginning 2/11/22.  Additional doses 3/4/22, 3/25/22, 5/6/22, 5/27/22, 6/17/22, 7/8/22.  - C/b interstitial pneumonitis (8/2022) requiring O2 supplementation, extended prednisone taper     Other PMH:  - History of protein S deficiency and splenic infarct in 2011 with history of anticoagulation prior to transplant. It has been determined that this is likely due to an acute illness and inflammation at the time of her protein S deficiency diagnosis and  she currently does not require anticoagulation.  - History of monoclonal IgG lambda gammopathy.  - Hypothyroidism.  - Hypertension.  - Depression and bipolar disorder.  - Restless leg syndrome.  - RUE celulitis d/t infiltrated IV during Brentuximab infusion (hospitalized 12/24 -12/30/14) & treated with Clindamycin  - COVID 4/2022  - Uvular swelling spring of 2022, s/p course of steroids from ENT without significant improvement; held pembro after 7/8/22         Relevant Orders    Estradiol    Follicle Stimulating Hormone    Luteinizing Hormone    NM PET CT lymphoma staging    Clinic Appointment Request Follow Up    Fibromyalgia M79.7       Subjective    History of Present  "Illness:  Isaias presents to the clinic today, unaccompanied.     Tearful. Cries all the time. On depression meds. Likes therapist 2x month.    Started losing hair in June. Recent adjustment in thyroid medication.     Sleep all the time. Some nights can't sleep (on days that active).    Gaining weight. 10# in 1 week. 208-218#. Food quantity and choices same. Constipation every other day-usually goes 2x day. Senna works.     PCP GLP1 inhibitor. Success with Wegovy in past. \"Takes food chatter away.\" Will follow up next week with PCP.    Breathing ok. Occas chronic cough. Walk on Monday, Cough more productive after laying down or with activity.    Mom went into menopause at age 41yo. Mirena IUD; no periods since 2015. No longer notices monthly day of cramping. No libido. Vaginal dryness. External yeast infection.     Cold in December. Resolved in 2 weeks.    Tender neck area. No LA noted.     Night sweats, not drenching. Hot flashes couple times a day.      Review of Systems   Constitutional:  Positive for fatigue.   Respiratory:  Positive for cough and shortness of breath.    Cardiovascular:  Positive for leg swelling (Occas).   Gastrointestinal:  Positive for constipation.   Genitourinary:  Positive for frequency.    Musculoskeletal:  Positive for back pain (Low).   Psychiatric/Behavioral:  Positive for depression and sleep disturbance. The patient is nervous/anxious.         Heightened            Objective        Physical Exam  Vitals reviewed.   Constitutional:       Appearance: Normal appearance.   HENT:      Head: Normocephalic and atraumatic.      Nose: Nose normal.      Mouth/Throat:      Mouth: Mucous membranes are moist.   Eyes:      Pupils: Pupils are equal, round, and reactive to light.   Cardiovascular:      Rate and Rhythm: Normal rate and regular rhythm.      Pulses: Normal pulses.      Heart sounds: Normal heart sounds.   Pulmonary:      Effort: Pulmonary effort is normal.      Breath sounds: Normal " breath sounds.   Abdominal:      General: Abdomen is flat. Bowel sounds are normal.      Palpations: Abdomen is soft.   Musculoskeletal:         General: Normal range of motion.      Cervical back: Normal range of motion.   Lymphadenopathy:      Comments: No palpable cervical, supraclavicular, axillary or inguinal LA.   Skin:     General: Skin is warm and dry.   Neurological:      General: No focal deficit present.      Mental Status: She is alert and oriented to person, place, and time.   Psychiatric:         Mood and Affect: Mood normal.       RTC 4/18 for PET scan and 4/19 for telephone visit to review results.

## 2024-03-29 NOTE — ASSESSMENT & PLAN NOTE
Hodgkin Lymphoma Diagnosis:  - stage IIB HL, dx 6/2013     ABVD:  - s/p ABVD x6 cycles with persistent disease noted in November 2013     BR:  - Enrolled on clinical trial SEGE-1412 with bendamustine and rituximab for 2 cycles  - PET-CT in March 2014 noted a CR     aSCT:  - s/p stem cell mobilization and collection:  collected 3.55 x10(6) CD34 cells per kg.   - s/p aSCT with BEAM preparative regimen  on April 15, 2014. Hospitalization complicated by neutropenic fever, pneumonia, menstrual bleeding, abdominal pain, nausea, vomiting, diarrhea, transaminitis secondary to medications, and electrolyte abnormalities.      Maintenance:  - s/p maintenance Brentuximab per clinical  research trial [id number], given on day 1 of each 21 day cycle.    Completed 16 cycles with CR by PET.     Relapse:  - She had imaging in July 2015 that suggested a possible recurrence. This was histologically confirmed by resection of a mediastinal mass by Dr. Oreilly.      Radiation:  - She began radiation therapy on 9/3/15 which completed on 10/6/15     Relapse:  - Admitted to [facility] (2/26/21) for weakness in left upper extremity.  MRI (2/23/21) demonstrated a tumor in the left paraspinous region and brachial plexus partially filling the C7-T1 neural foramen  (no cord compression), and enlarged left supraclavicular nodes. Ortho/Spine consulted on admit, no surgical intervention needed. PET scan (3/1/21) demonstrated left paraspinal mass, left supraclavicular node, several cervical LN. Patient underwent a core  biopsy x 3 of the left supraclavicular node, confirmed Classical Hodgkin Lymphoma relapse. Patient was started on Prednisone 50mg daily      Pembro:  - Salvage pembrolizumab given 3/23, 4/13, 5/4, 5/25, 6/18, 7/9/2021  - EOT PET showed Deauville 1 response.  Discussed in tumor board on 8/19/21, reviewed with rad onc; appears that her relapse overlapped some of her prior radiation ports.  Recommended consideration of consolidative  radiation.     Radiation:  - 19 fractions planned to recurrence site started in early Oct, 2021 (10/19/21-11/11/21)  - EOT PET 11/29/21 - deauville 1     Maintenance:  - Pembro q3w beginning 2/11/22.  Additional doses 3/4/22, 3/25/22, 5/6/22, 5/27/22, 6/17/22, 7/8/22.  - C/b interstitial pneumonitis (8/2022) requiring O2 supplementation, extended prednisone taper     Other PMH:  - History of protein S deficiency and splenic infarct in 2011 with history of anticoagulation prior to transplant. It has been determined that this is likely due to an acute illness and inflammation at the time of her protein S deficiency diagnosis and  she currently does not require anticoagulation.  - History of monoclonal IgG lambda gammopathy.  - Hypothyroidism.  - Hypertension.  - Depression and bipolar disorder.  - Restless leg syndrome.  - RUE celulitis d/t infiltrated IV during Brentuximab infusion (hospitalized 12/24 -12/30/14) & treated with Clindamycin  - COVID 4/2022  - Uvular swelling spring of 2022, s/p course of steroids from ENT without significant improvement; held pembro after 7/8/22

## 2024-03-30 ENCOUNTER — LAB (OUTPATIENT)
Dept: LAB | Facility: LAB | Age: 44
End: 2024-03-30
Payer: COMMERCIAL

## 2024-03-30 DIAGNOSIS — C81.90 HODGKIN LYMPHOMA, UNSPECIFIED HODGKIN LYMPHOMA TYPE, UNSPECIFIED BODY REGION (MULTI): ICD-10-CM

## 2024-03-30 LAB
ALBUMIN SERPL BCP-MCNC: 3.8 G/DL (ref 3.4–5)
ALP SERPL-CCNC: 101 U/L (ref 33–110)
ALT SERPL W P-5'-P-CCNC: 22 U/L (ref 7–45)
ANION GAP SERPL CALC-SCNC: 15 MMOL/L (ref 10–20)
AST SERPL W P-5'-P-CCNC: 22 U/L (ref 9–39)
BASOPHILS # BLD AUTO: 0.03 X10*3/UL (ref 0–0.1)
BASOPHILS NFR BLD AUTO: 0.6 %
BILIRUB SERPL-MCNC: 0.4 MG/DL (ref 0–1.2)
BUN SERPL-MCNC: 17 MG/DL (ref 6–23)
CALCIUM SERPL-MCNC: 8.6 MG/DL (ref 8.6–10.3)
CHLORIDE SERPL-SCNC: 105 MMOL/L (ref 98–107)
CO2 SERPL-SCNC: 26 MMOL/L (ref 21–32)
CREAT SERPL-MCNC: 0.72 MG/DL (ref 0.5–1.05)
EGFRCR SERPLBLD CKD-EPI 2021: >90 ML/MIN/1.73M*2
EOSINOPHIL # BLD AUTO: 0.18 X10*3/UL (ref 0–0.7)
EOSINOPHIL NFR BLD AUTO: 3.7 %
ERYTHROCYTE [DISTWIDTH] IN BLOOD BY AUTOMATED COUNT: 15 % (ref 11.5–14.5)
ESTRADIOL SERPL-MCNC: 108 PG/ML
FSH SERPL-ACNC: 36.3 IU/L
GLUCOSE SERPL-MCNC: 108 MG/DL (ref 74–99)
HCT VFR BLD AUTO: 38.3 % (ref 36–46)
HGB BLD-MCNC: 12.1 G/DL (ref 12–16)
IMM GRANULOCYTES # BLD AUTO: 0 X10*3/UL (ref 0–0.7)
IMM GRANULOCYTES NFR BLD AUTO: 0 % (ref 0–0.9)
LDH SERPL L TO P-CCNC: 161 U/L (ref 84–246)
LH SERPL-ACNC: 38.5 IU/L
LYMPHOCYTES # BLD AUTO: 1.66 X10*3/UL (ref 1.2–4.8)
LYMPHOCYTES NFR BLD AUTO: 34.2 %
MAGNESIUM SERPL-MCNC: 1.89 MG/DL (ref 1.6–2.4)
MCH RBC QN AUTO: 27.9 PG (ref 26–34)
MCHC RBC AUTO-ENTMCNC: 31.6 G/DL (ref 32–36)
MCV RBC AUTO: 88 FL (ref 80–100)
MONOCYTES # BLD AUTO: 0.5 X10*3/UL (ref 0.1–1)
MONOCYTES NFR BLD AUTO: 10.3 %
NEUTROPHILS # BLD AUTO: 2.49 X10*3/UL (ref 1.2–7.7)
NEUTROPHILS NFR BLD AUTO: 51.2 %
NRBC BLD-RTO: 0 /100 WBCS (ref 0–0)
PLATELET # BLD AUTO: 207 X10*3/UL (ref 150–450)
POTASSIUM SERPL-SCNC: 3.9 MMOL/L (ref 3.5–5.3)
PROT SERPL-MCNC: 6.5 G/DL (ref 6.4–8.2)
RBC # BLD AUTO: 4.34 X10*6/UL (ref 4–5.2)
SODIUM SERPL-SCNC: 142 MMOL/L (ref 136–145)
URATE SERPL-MCNC: 5.4 MG/DL (ref 2.3–6.7)
WBC # BLD AUTO: 4.9 X10*3/UL (ref 4.4–11.3)

## 2024-03-30 PROCEDURE — 85025 COMPLETE CBC W/AUTO DIFF WBC: CPT

## 2024-03-30 PROCEDURE — 82670 ASSAY OF TOTAL ESTRADIOL: CPT

## 2024-03-30 PROCEDURE — 83735 ASSAY OF MAGNESIUM: CPT

## 2024-03-30 PROCEDURE — 83002 ASSAY OF GONADOTROPIN (LH): CPT

## 2024-03-30 PROCEDURE — 80053 COMPREHEN METABOLIC PANEL: CPT

## 2024-03-30 PROCEDURE — 83001 ASSAY OF GONADOTROPIN (FSH): CPT

## 2024-03-30 PROCEDURE — 83615 LACTATE (LD) (LDH) ENZYME: CPT

## 2024-03-30 PROCEDURE — 84550 ASSAY OF BLOOD/URIC ACID: CPT

## 2024-04-01 ENCOUNTER — TELEPHONE (OUTPATIENT)
Dept: HEMATOLOGY/ONCOLOGY | Facility: HOSPITAL | Age: 44
End: 2024-04-01
Payer: COMMERCIAL

## 2024-04-01 ENCOUNTER — TELEPHONE (OUTPATIENT)
Dept: ADMISSION | Facility: HOSPITAL | Age: 44
End: 2024-04-01
Payer: COMMERCIAL

## 2024-04-01 DIAGNOSIS — G89.3 CHRONIC PAIN DUE TO NEOPLASM: Primary | ICD-10-CM

## 2024-04-01 DIAGNOSIS — G89.3 CANCER RELATED PAIN: Primary | ICD-10-CM

## 2024-04-01 RX ORDER — OXYCODONE HYDROCHLORIDE 5 MG/1
5 TABLET ORAL EVERY 6 HOURS PRN
COMMUNITY
End: 2024-04-01 | Stop reason: SDUPTHER

## 2024-04-01 RX ORDER — OXYCODONE HYDROCHLORIDE 5 MG/1
5 CAPSULE ORAL EVERY 8 HOURS PRN
Qty: 90 CAPSULE | Refills: 0 | Status: SHIPPED | OUTPATIENT
Start: 2024-04-01 | End: 2024-04-01 | Stop reason: RX

## 2024-04-01 RX ORDER — OXYCODONE HYDROCHLORIDE 5 MG/1
5 TABLET ORAL EVERY 8 HOURS PRN
Qty: 90 TABLET | Refills: 0 | Status: SHIPPED | OUTPATIENT
Start: 2024-04-01 | End: 2024-04-29 | Stop reason: SDUPTHER

## 2024-04-01 NOTE — TELEPHONE ENCOUNTER
Isaias Chamorro called the refill line for Oxycodone. Would like refills to be sent to [facility] pharmacy on file. Message sent to Palliative Care team to send in.

## 2024-04-01 NOTE — TELEPHONE ENCOUNTER
Pharmacist left a voicemail on the RN triage line requesting clarification on directions for oxycodone noting that there are two sets of instructions on the sig.  She also asked if prescription can be re-sent for oxycodone tablets?  They do not have capsules in stock.

## 2024-04-01 NOTE — TELEPHONE ENCOUNTER
New script pended to provider for oxycodone IR 5 mg tablets every 6 hours 120/30 instead of capsules.  Patient called and updated.

## 2024-04-01 NOTE — TELEPHONE ENCOUNTER
Refill pended to provider for oxycodone IR 5 mg every 6 hours 120/30.  OARRS reviewed. Patient due for refill.  Patient to follow up with Mishel Cantu 5/15.

## 2024-04-04 ENCOUNTER — TELEPHONE (OUTPATIENT)
Dept: HEMATOLOGY/ONCOLOGY | Facility: CLINIC | Age: 44
End: 2024-04-04

## 2024-04-09 DIAGNOSIS — C81.90 HODGKIN LYMPHOMA, UNSPECIFIED HODGKIN LYMPHOMA TYPE, UNSPECIFIED BODY REGION (MULTI): Primary | ICD-10-CM

## 2024-04-10 NOTE — PATIENT INSTRUCTIONS
Hormonal Testing Visit Instructions:  You were seen in the office today for hormonal testing due perimenopausal symptoms  Your pregnancy test was recently negative in another physician's office  Hot flashes, hair loss, weight gain can be due to a variety of causes. Possible causes include hormonal dysfunction of non gynecologic glands like the thyroid, gynecologic hormonal syndromes like polycystic ovarian syndrome, stress, pituitary tumors, and premature menopause.   You have been given orders for labs, which can be done at any time and do not require fasting. You will be notified of results by MyChart and/or phone.  Further management is dependent on lab findings but may include hormonal regulation, referral to weight loss medicine, functional medicine, or endocrinology  If you have any questions or have onset of heavy bleeding, please call the office. [phone] ([place]) or [phone] ([place])

## 2024-04-12 ENCOUNTER — OFFICE VISIT (OUTPATIENT)
Dept: OBSTETRICS AND GYNECOLOGY | Facility: CLINIC | Age: 44
End: 2024-04-12
Payer: COMMERCIAL

## 2024-04-12 ENCOUNTER — LAB (OUTPATIENT)
Dept: LAB | Facility: LAB | Age: 44
End: 2024-04-12
Payer: COMMERCIAL

## 2024-04-12 VITALS
DIASTOLIC BLOOD PRESSURE: 74 MMHG | SYSTOLIC BLOOD PRESSURE: 104 MMHG | HEIGHT: 59 IN | BODY MASS INDEX: 43.34 KG/M2 | WEIGHT: 215 LBS

## 2024-04-12 DIAGNOSIS — L68.0 HIRSUTISM: ICD-10-CM

## 2024-04-12 DIAGNOSIS — T50.905A DRUG-INDUCED PNEUMONITIS: ICD-10-CM

## 2024-04-12 DIAGNOSIS — N95.1 MENOPAUSAL SYMPTOMS: Primary | ICD-10-CM

## 2024-04-12 DIAGNOSIS — J98.4 DRUG-INDUCED PNEUMONITIS: ICD-10-CM

## 2024-04-12 DIAGNOSIS — N95.1 MENOPAUSAL SYMPTOMS: ICD-10-CM

## 2024-04-12 DIAGNOSIS — J84.89 ORGANIZING PNEUMONIA (MULTI): ICD-10-CM

## 2024-04-12 LAB
ALBUMIN SERPL BCP-MCNC: 4.5 G/DL (ref 3.4–5)
ALP SERPL-CCNC: 104 U/L (ref 33–110)
ALT SERPL W P-5'-P-CCNC: 27 U/L (ref 7–45)
ANION GAP SERPL CALC-SCNC: 14 MMOL/L (ref 10–20)
AST SERPL W P-5'-P-CCNC: 30 U/L (ref 9–39)
BASOPHILS # BLD AUTO: 0.07 X10*3/UL (ref 0–0.1)
BASOPHILS NFR BLD AUTO: 1.2 %
BILIRUB SERPL-MCNC: 0.5 MG/DL (ref 0–1.2)
BUN SERPL-MCNC: 18 MG/DL (ref 6–23)
CALCIUM SERPL-MCNC: 9.3 MG/DL (ref 8.6–10.3)
CHLORIDE SERPL-SCNC: 101 MMOL/L (ref 98–107)
CO2 SERPL-SCNC: 28 MMOL/L (ref 21–32)
CREAT SERPL-MCNC: 0.74 MG/DL (ref 0.5–1.05)
EGFRCR SERPLBLD CKD-EPI 2021: >90 ML/MIN/1.73M*2
EOSINOPHIL # BLD AUTO: 0.14 X10*3/UL (ref 0–0.7)
EOSINOPHIL NFR BLD AUTO: 2.5 %
ERYTHROCYTE [DISTWIDTH] IN BLOOD BY AUTOMATED COUNT: 14.5 % (ref 11.5–14.5)
GLUCOSE SERPL-MCNC: 88 MG/DL (ref 74–99)
HCT VFR BLD AUTO: 42.7 % (ref 36–46)
HGB BLD-MCNC: 13.6 G/DL (ref 12–16)
IMM GRANULOCYTES # BLD AUTO: 0.02 X10*3/UL (ref 0–0.7)
IMM GRANULOCYTES NFR BLD AUTO: 0.4 % (ref 0–0.9)
LYMPHOCYTES # BLD AUTO: 1.85 X10*3/UL (ref 1.2–4.8)
LYMPHOCYTES NFR BLD AUTO: 32.9 %
MCH RBC QN AUTO: 27.8 PG (ref 26–34)
MCHC RBC AUTO-ENTMCNC: 31.9 G/DL (ref 32–36)
MCV RBC AUTO: 87 FL (ref 80–100)
MONOCYTES # BLD AUTO: 0.49 X10*3/UL (ref 0.1–1)
MONOCYTES NFR BLD AUTO: 8.7 %
NEUTROPHILS # BLD AUTO: 3.06 X10*3/UL (ref 1.2–7.7)
NEUTROPHILS NFR BLD AUTO: 54.3 %
NRBC BLD-RTO: 0 /100 WBCS (ref 0–0)
PLATELET # BLD AUTO: 258 X10*3/UL (ref 150–450)
POTASSIUM SERPL-SCNC: 4.6 MMOL/L (ref 3.5–5.3)
PROT SERPL-MCNC: 7.4 G/DL (ref 6.4–8.2)
RBC # BLD AUTO: 4.9 X10*6/UL (ref 4–5.2)
SODIUM SERPL-SCNC: 138 MMOL/L (ref 136–145)
WBC # BLD AUTO: 5.6 X10*3/UL (ref 4.4–11.3)

## 2024-04-12 PROCEDURE — 1036F TOBACCO NON-USER: CPT | Performed by: OBSTETRICS & GYNECOLOGY

## 2024-04-12 PROCEDURE — 80053 COMPREHEN METABOLIC PANEL: CPT

## 2024-04-12 PROCEDURE — 85025 COMPLETE CBC W/AUTO DIFF WBC: CPT

## 2024-04-12 PROCEDURE — 3008F BODY MASS INDEX DOCD: CPT | Performed by: OBSTETRICS & GYNECOLOGY

## 2024-04-12 PROCEDURE — 84402 ASSAY OF FREE TESTOSTERONE: CPT

## 2024-04-12 PROCEDURE — 99214 OFFICE O/P EST MOD 30 MIN: CPT | Performed by: OBSTETRICS & GYNECOLOGY

## 2024-04-12 ASSESSMENT — ENCOUNTER SYMPTOMS
MUSCULOSKELETAL NEGATIVE: 1
GASTROINTESTINAL NEGATIVE: 1
CONSTITUTIONAL NEGATIVE: 1
NEUROLOGICAL NEGATIVE: 1
CARDIOVASCULAR NEGATIVE: 1
PSYCHIATRIC NEGATIVE: 1
RESPIRATORY NEGATIVE: 1

## 2024-04-12 NOTE — PROGRESS NOTES
"Subjective   Patient ID: Isaias Chamorro is a 43 y.o. female who presents for Consult.  HPI    Patient presents for karthik-menopausal symptoms. She states starting in January of this year she noted significant fatigue that did not improve. She states she would have difficulty getting out of bed and need frequent naps to function. She notes resurgence of migraines. She was first diagnosed with migraines during puberty but they have been well controlled for several years. She states she notes significant decrease in libido over the last year. The patient notes onset of hot flashes 6 weeks ago that have significantly worsened over time. She additionally notes difficulty losing weight/weight gain and hair loss in addition to hirsutism (known history PCOS). She is amenorrheic on Mirena IUD. She reports her mother started to have perimenopausal symptoms in her early 40's.    Review of Systems   Constitutional: Negative.    Respiratory: Negative.     Cardiovascular: Negative.    Gastrointestinal: Negative.    Genitourinary: Negative.    Musculoskeletal: Negative.    Neurological: Negative.    Psychiatric/Behavioral: Negative.         Objective   Visit Vitals  /74   Ht 1.486 m (4' 10.5\")   Wt 97.5 kg (215 lb)   BMI 44.17 kg/m²   OB Status Implant   Smoking Status Never   BSA 2.01 m²      Physical Exam  Constitutional:       Appearance: Normal appearance.   Pulmonary:      Effort: Pulmonary effort is normal.   Neurological:      Mental Status: She is alert.   Psychiatric:         Mood and Affect: Mood normal.         Behavior: Behavior normal.         Assessment/Plan   Problem List Items Addressed This Visit    None  Visit Diagnoses         Codes    Menopausal symptoms    -  Primary N95.1    Discussed clinical course   Discussed common causes of symptoms and recent TSH, E2, FSH labs  Tesosterone ordered.   Discussed HRT, patient considering                  Ronda Fine DO 04/12/24 11:37 AM   "

## 2024-04-18 ENCOUNTER — APPOINTMENT (OUTPATIENT)
Dept: RADIOLOGY | Facility: CLINIC | Age: 44
End: 2024-04-18
Payer: COMMERCIAL

## 2024-04-18 ENCOUNTER — TELEPHONE (OUTPATIENT)
Dept: HEMATOLOGY/ONCOLOGY | Facility: HOSPITAL | Age: 44
End: 2024-04-18
Payer: COMMERCIAL

## 2024-04-18 LAB
TESTOSTERONE FREE (CHAN): 3.4 PG/ML (ref 0.1–6.4)
TESTOSTERONE,TOTAL,LC-MS/MS: 27 NG/DL (ref 2–45)

## 2024-04-18 RX ORDER — ESTRADIOL 0.03 MG/D
1 FILM, EXTENDED RELEASE TRANSDERMAL 2 TIMES WEEKLY
Qty: 24 PATCH | Refills: 3 | Status: SHIPPED | OUTPATIENT
Start: 2024-04-18 | End: 2025-04-18

## 2024-04-18 NOTE — TELEPHONE ENCOUNTER
RN called and left a message for the patient to set up a phone visit with Dr. Webber on 5/3 so she can go over the CT results from 4/29.

## 2024-04-19 ENCOUNTER — APPOINTMENT (OUTPATIENT)
Dept: HEMATOLOGY/ONCOLOGY | Facility: HOSPITAL | Age: 44
End: 2024-04-19
Payer: COMMERCIAL

## 2024-04-24 ENCOUNTER — APPOINTMENT (OUTPATIENT)
Dept: RADIOLOGY | Facility: HOSPITAL | Age: 44
End: 2024-04-24
Payer: COMMERCIAL

## 2024-04-26 ENCOUNTER — APPOINTMENT (OUTPATIENT)
Dept: HEMATOLOGY/ONCOLOGY | Facility: HOSPITAL | Age: 44
End: 2024-04-26
Payer: COMMERCIAL

## 2024-04-29 ENCOUNTER — HOSPITAL ENCOUNTER (OUTPATIENT)
Dept: RADIOLOGY | Facility: HOSPITAL | Age: 44
Discharge: HOME | End: 2024-04-29
Payer: COMMERCIAL

## 2024-04-29 ENCOUNTER — TELEPHONE (OUTPATIENT)
Dept: ADMISSION | Facility: HOSPITAL | Age: 44
End: 2024-04-29
Payer: COMMERCIAL

## 2024-04-29 DIAGNOSIS — F41.9 ANXIETY: ICD-10-CM

## 2024-04-29 DIAGNOSIS — C81.90 HODGKIN LYMPHOMA, UNSPECIFIED HODGKIN LYMPHOMA TYPE, UNSPECIFIED BODY REGION (MULTI): ICD-10-CM

## 2024-04-29 DIAGNOSIS — G89.3 CHRONIC PAIN DUE TO NEOPLASM: ICD-10-CM

## 2024-04-29 DIAGNOSIS — G89.3 CANCER RELATED PAIN: ICD-10-CM

## 2024-04-29 DIAGNOSIS — Z51.5 ENCOUNTER FOR PALLIATIVE CARE: ICD-10-CM

## 2024-04-29 PROCEDURE — 71260 CT THORAX DX C+: CPT | Performed by: STUDENT IN AN ORGANIZED HEALTH CARE EDUCATION/TRAINING PROGRAM

## 2024-04-29 PROCEDURE — 2550000001 HC RX 255 CONTRASTS: Performed by: NURSE PRACTITIONER

## 2024-04-29 PROCEDURE — 74177 CT ABD & PELVIS W/CONTRAST: CPT

## 2024-04-29 PROCEDURE — 74177 CT ABD & PELVIS W/CONTRAST: CPT | Performed by: STUDENT IN AN ORGANIZED HEALTH CARE EDUCATION/TRAINING PROGRAM

## 2024-04-29 RX ORDER — METHADONE HYDROCHLORIDE 5 MG/1
TABLET ORAL
Qty: 45 TABLET | Refills: 0 | Status: SHIPPED | OUTPATIENT
Start: 2024-04-29 | End: 2024-05-15 | Stop reason: SDUPTHER

## 2024-04-29 RX ORDER — LORAZEPAM 0.5 MG/1
0.5 TABLET ORAL 3 TIMES DAILY PRN
Qty: 90 TABLET | Refills: 2 | Status: SHIPPED | OUTPATIENT
Start: 2024-04-29 | End: 2024-07-28

## 2024-04-29 RX ORDER — OXYCODONE HYDROCHLORIDE 5 MG/1
5 TABLET ORAL EVERY 8 HOURS PRN
Qty: 90 TABLET | Refills: 0 | Status: SHIPPED | OUTPATIENT
Start: 2024-04-29 | End: 2024-05-15 | Stop reason: SDUPTHER

## 2024-04-29 RX ADMIN — IOHEXOL 75 ML: 350 INJECTION, SOLUTION INTRAVENOUS at 10:24

## 2024-04-29 NOTE — TELEPHONE ENCOUNTER
Patient last seen by MORRO Cantu on 2/16 with plan to continue oxycodone 2.5-5mg q6h PRN, methadone 2.5mg QAM and 5mg QPM, and ativan 0.5mg, 1/2-1 tab q8h PRN. Follow up visit is scheduled for 5/15.OARRS reviewed and no aberrancy noted. Patient last filled oxycodone 5mg #90/30 days on 4/1. Patient last filled ativan 0.5mg #90/30 days on 3/31 and methadone 5mg #45/30 days on 3/26. Prescriptions pended to provider to approve.

## 2024-04-29 NOTE — TELEPHONE ENCOUNTER
Isaias Chamorro called the refill line for Lorazepam, Methadone 5mg and Oxycodone 5mg. Would like refills to be sent to [facility] pharmacy on file. Message sent to Palliative Care team to send in.

## 2024-04-30 DIAGNOSIS — E66.01 MORBIDLY OBESE (MULTI): ICD-10-CM

## 2024-04-30 DIAGNOSIS — E66.01 CLASS 3 SEVERE OBESITY DUE TO EXCESS CALORIES WITH SERIOUS COMORBIDITY AND BODY MASS INDEX (BMI) OF 40.0 TO 44.9 IN ADULT (MULTI): ICD-10-CM

## 2024-04-30 NOTE — TELEPHONE ENCOUNTER
Patient called and said she needs a refill on the next increased dose. She is currently taking the 5mg.

## 2024-05-03 ENCOUNTER — TELEMEDICINE (OUTPATIENT)
Dept: HEMATOLOGY/ONCOLOGY | Facility: HOSPITAL | Age: 44
End: 2024-05-03
Payer: COMMERCIAL

## 2024-05-03 ENCOUNTER — LAB (OUTPATIENT)
Dept: LAB | Facility: LAB | Age: 44
End: 2024-05-03
Payer: COMMERCIAL

## 2024-05-03 DIAGNOSIS — C81.91 HODGKIN LYMPHOMA OF LYMPH NODES OF NECK, UNSPECIFIED HODGKIN LYMPHOMA TYPE (MULTI): Primary | ICD-10-CM

## 2024-05-03 DIAGNOSIS — R79.89 ABNORMAL TSH: ICD-10-CM

## 2024-05-03 LAB — TSH SERPL-ACNC: 0.96 MIU/L (ref 0.44–3.98)

## 2024-05-03 PROCEDURE — 99215 OFFICE O/P EST HI 40 MIN: CPT | Performed by: STUDENT IN AN ORGANIZED HEALTH CARE EDUCATION/TRAINING PROGRAM

## 2024-05-03 PROCEDURE — 3008F BODY MASS INDEX DOCD: CPT | Performed by: STUDENT IN AN ORGANIZED HEALTH CARE EDUCATION/TRAINING PROGRAM

## 2024-05-03 PROCEDURE — 84443 ASSAY THYROID STIM HORMONE: CPT

## 2024-05-03 ASSESSMENT — ENCOUNTER SYMPTOMS
NERVOUS/ANXIOUS: 1
CONSTIPATION: 1
FREQUENCY: 1
SLEEP DISTURBANCE: 1
DEPRESSION: 1
FATIGUE: 1

## 2024-05-03 NOTE — PROGRESS NOTES
"Patient ID:  Isaias Chamorro is a 43 y.o. female.  Referring Physician:   ONC Dr Webber  Primary Care Provider:  MORRO Jacobson-CNP    Assessment/Plan      Isaias's imaging shows no evidence of lymphoma recurrence.  Symptoms more consistent with perimenopause, which she is addressing with gynecology.      HL  - Remains in CR after aSCT 4/15/24, pembro for post-transplant relapse (c/b pneumonitis)    Follows with pulmonary, supp onc, gynecology, PCP    Follow up in 6m unless new concerns arise.    Subjective    History of Present Illness:  Pt presents virtually today to discuss results of CT imaging.    At last visit, she reported migraines, feeling \"awful,\" \"depressed for no reason,\" and night sweats (not drenching but \"enough to cause concern.\"  ).  She was also evaluated by gynecology and started HRT a little less than 3w ago due to suspected perimenopause.  Symptoms have improved significantly since then.    Otherwise she is still constipated, thinking about getting back on Linzess.            Review of Systems   Constitutional:  Positive for fatigue.   Cardiovascular:  Leg swelling: Occas.   Gastrointestinal:  Positive for constipation.   Genitourinary:  Positive for frequency.    Musculoskeletal:  Back pain: Low.   Psychiatric/Behavioral:  Positive for depression and sleep disturbance. The patient is nervous/anxious.         Heightened            Objective        Physical Exam  Pulmonary:      Effort: No respiratory distress.      Breath sounds: Normal breath sounds. No stridor. No wheezing.   Neurological:      Mental Status: She is alert.   Psychiatric:         Mood and Affect: Mood normal.         Thought Content: Thought content normal.         Judgment: Judgment normal.     CT c/a/p reviewed; no lymphadenopathy, lungs clear    Oncology History Overview Note   Hodgkin Lymphoma Diagnosis:  - stage IIB HL, dx 6/2013     ABVD:  - s/p ABVD x6 cycles with persistent disease noted in November " 2013     BR:  - Enrolled on clinical trial SEGE-1412 with bendamustine and rituximab for 2 cycles  - PET-CT in March 2014 noted a CR     aSCT:  - s/p stem cell mobilization and collection:  collected 3.55 x10(6) CD34 cells per kg.   - s/p aSCT with BEAM preparative regimen  on April 15, 2014. Hospitalization complicated by neutropenic fever, pneumonia, menstrual bleeding, abdominal pain, nausea, vomiting, diarrhea, transaminitis secondary to medications, and electrolyte abnormalities.      Maintenance:  - s/p maintenance Brentuximab per clinical  research trial [id number], given on day 1 of each 21 day cycle.    Completed 16 cycles with CR by PET.     Relapse:  - She had imaging in July 2015 that suggested a possible recurrence. This was histologically confirmed by resection of a mediastinal mass by Dr. Oreilly.      Radiation:  - She began radiation therapy on 9/3/15 which completed on 10/6/15     Relapse:  - Admitted to [facility] (2/26/21) for weakness in left upper extremity.  MRI (2/23/21) demonstrated a tumor in the left paraspinous region and brachial plexus partially filling the C7-T1 neural foramen  (no cord compression), and enlarged left supraclavicular nodes. Ortho/Spine consulted on admit, no surgical intervention needed. PET scan (3/1/21) demonstrated left paraspinal mass, left supraclavicular node, several cervical LN. Patient underwent a core  biopsy x 3 of the left supraclavicular node, confirmed Classical Hodgkin Lymphoma relapse. Patient was started on Prednisone 50mg daily      Pembro:  - Salvage pembrolizumab given 3/23, 4/13, 5/4, 5/25, 6/18, 7/9/2021  - EOT PET showed Deauville 1 response.  Discussed in tumor board on 8/19/21, reviewed with rad onc; appears that her relapse overlapped some of her prior radiation ports.  Recommended consideration of consolidative radiation.     Radiation:  - 19 fractions planned to recurrence site started in early Oct, 2021 (10/19/21-11/11/21)  - EOT PET 11/29/21 -  chi 1     Maintenance:  - Pembro q3w beginning 2/11/22.  Additional doses 3/4/22, 3/25/22, 5/6/22, 5/27/22, 6/17/22, 7/8/22.  - C/b interstitial pneumonitis (8/2022) requiring O2 supplementation, extended prednisone taper     Other PMH:  - History of protein S deficiency and splenic infarct in 2011 with history of anticoagulation prior to transplant. It has been determined that this is likely due to an acute illness and inflammation at the time of her protein S deficiency diagnosis and  she currently does not require anticoagulation.  - History of monoclonal IgG lambda gammopathy.  - Hypothyroidism.  - Hypertension.  - Depression and bipolar disorder.  - Restless leg syndrome.  - RUE celulitis d/t infiltrated IV during Brentuximab infusion (hospitalized 12/24 -12/30/14) & treated with Clindamycin  - COVID 4/2022  - Uvular swelling spring of 2022, s/p course of steroids from ENT without significant improvement; held pembro after 7/8/22        Hodgkins lymphoma (Multi)   9/17/2015 Initial Diagnosis    Hodgkins lymphoma (Multi)

## 2024-05-13 NOTE — PROGRESS NOTES
SUPPORTIVE AND PALLIATIVE ONCOLOGY OUTPATIENT FOLLOW-UP      SERVICE DATE: 5/15/2024    Subjective   HISTORY OF PRESENT ILLNESS: Isaias Chamorro is a 43 y.o. female who presents with past medical history of Hodgkin's Lymphoma, originally diagnosed June 2013, with recent relapse in March of 2021.  She received RT therapy to paraspinal mass, was pursuing Pembrolizumab but developed pneumonitis. Currently on surveillance.   Patient follows with Supportive Oncology for pain and further symptom management.     Pain Assessment:  Pain Score:  3  Location:  right shoulder  Description:      Symptom Assessment:  Pain:a little - notes overall pain continues to be well controlled with current regimen, continues with Methadone 2.5mg AM and 5mg at bed, taking Oxycodone 2-3x per day for breakthrough. Has been out of her Topiramate and notes without it that she has had a big change in her pain, and is in need of a refill today  Numbness or Tingling in hands/feet/other: a little  Sore Muscles/Spasms: none  Headache: none  Dizziness:none  Constipation: somewhat - notes she has been without her Linzess and has been really struggling with bowels, only going 2x per week. Asking if this can be refilled until she see's her Gastro doctor  Diarrhea: none  Nausea: a little - relieved with Lorazepam as needed  Vomiting: none  Lack of Appetite: none   Weight Loss: none  Taste changes: none  Dry Mouth: none  Pain in Mouth/Swallowing: none  Lack of Energy: none - notes to be trying to workout a few days a week and doing therapy exercises on her own  Difficulty Sleeping: none  Worrying: none  Anxiety: a little - notes she found out she is perimenopausal with the hormone changes she has had some fluctuating mood and anxiety. As hormones are normalizing this seems to be improving. Also noting that May is a difficult month for her with her dad's passing  Depression: a little  Shortness of breath: none  Other: none      Information  "obtained from: chart review and interview of patient  ______________________________________________________________________        Objective     PHYSICAL EXAMINATION   Vital Signs:   Vital signs reviewed      10/2/2023     4:23 PM 12/29/2023     1:14 PM 1/2/2024     1:11 PM 3/6/2024    12:50 PM 3/29/2024    11:27 AM 4/12/2024    11:10 AM 5/15/2024     1:09 PM   Vitals   Systolic 113 141  117 122 104 122   Diastolic 81 93  82 81 74 89   Heart Rate 85 92  86 87  91   Temp 36.2 °C (97.2 °F) 36.6 °C (97.9 °F)   36.1 °C (97 °F)  36 °C (96.8 °F)   Resp  18   18  16   Height (in)    1.486 m (4' 10.5\")  1.486 m (4' 10.5\")    Weight (lb) 209 210.4 208 211 218.04 215 209.2   BMI 42.21 kg/m2 42.5 kg/m2 42.01 kg/m2 43.35 kg/m2 44.79 kg/m2 44.17 kg/m2 42.98 kg/m2   BSA (m2) 1.99 m2 1.99 m2 1.98 m2 1.99 m2 2.02 m2 2.01 m2 1.98 m2   Visit Report Report Report    Report  Report Report Report Report      Physical Exam  Vitals reviewed.   Constitutional:       Appearance: Normal appearance.   HENT:      Head: Normocephalic.   Cardiovascular:      Rate and Rhythm: Normal rate and regular rhythm.   Pulmonary:      Effort: Pulmonary effort is normal.   Abdominal:      General: Abdomen is flat. Bowel sounds are normal.      Palpations: Abdomen is soft.   Musculoskeletal:         General: Normal range of motion.   Skin:     General: Skin is warm and dry.   Neurological:      General: No focal deficit present.      Mental Status: She is alert and oriented to person, place, and time. Mental status is at baseline.   Psychiatric:         Mood and Affect: Mood normal.         Behavior: Behavior normal.         Thought Content: Thought content normal.         Judgment: Judgment normal.       ASSESSMENT/PLAN    Pain  Pain is: chronic - post cancer therapy  Type: somatic and neuropathic  Home regimen:   - Continue Methadone 2.5mg AM and 5mg at bed  - EKG (12/29/23) . Redo with each Methadone increase  - Continue Oxycodone 2.5-5mg [id number] " PRN  - Continue Tizanidine 2mg 1-2x per day for muscle spasms - has not needed recently  - Continue Topiramate 100mg TID  - Following with Dr Beebe - pursuing acupuncture  - Pursuing massage therapy/stretching  - Restarting Aquatic Therapy/PT  Intolerances/previously tried:   - Unable to take NSAIDs d/t bariatric surgery and Abreu's Esophagus  - Gabapentin - did not tolerate - excessive weight gain  - Pregabalin - did not tolerate  - Duloxetine - did not tolerate in past  - D/C Nortriptyline 10mg once daily at bed due to concerns of serotonin syndrome      Opioid Use  Medication Management:   - OARRS report reviewed with no aberrant behavior; consistent with  prescriptions/records and patient history  - MED 45.  Overdose Risk Score 200.   This has been discussed with patient.   - We will continue to closely monitor the patient for signs of prescription misuse including UDS, OARRS review and subjective reports at each visit.  - Concurrent benzodiazepine use with lorazepam, educated on risks.  - I am a provider who either is or has consulted and collaborated with a provider certified in Hospice and Palliative Medicine and have conducted a face-face visit and examination for this patient.  - Routine Urine Drug Screen: 4/21/23 appropriately + for prescribed medications and - for illicits. Pending results 5/15/2024  - Controlled Substance Agreement: 5/15/2024  - Specifically discussed that controlled substance prescriptions will only be provided by our group as outlined in the completed agreement  - Prescribed naloxone: 5/6/22  - Red Flags: none     Constipation (Chronic in nature)  At risk for constipation related to opioids.  Home regimen:   - Following with Gastro Dr. Borrero  - Restart Linzess 145mcg daily  - Continue Senna-S PRN  - Continue Milk of Magnesia 24% Concentrate - 10mL once daily PRN for severe constipation  - Encouraged trying Bisacoydl suppository in addition for further relief     Nausea (improved)  -  Continue Ondansetron 8mg [id number] PRN  - Continue Lorazepam 0.5mg, 1/2-1 tab q8 hours PRN     Altered Mood  Chronic anxiety and depression related to hx of bipolar disorder .  Home regimen:   - Managed by outside provider - appreciate recs  - Following with trauma counselor  - Pursuing acupuncture/massage  - Continue Lamictal 75mg daily  - Continue Sertraline 150mg daily  - Continue Lorazepam 0.5mg, 1/2 to 1 tablet [id number] PRN for anxiety  - Continue Abilify as prescribed by psychiatrist     Supportive and Palliative Oncology Encounter:  Emotional support provided  Coordination of care  Will continue to follow and address symptoms as needed    Next Follow-Up Visit:  Return to clinic in 3 months    Signature and billing  Medical complexity was moderate level due to due to complexity of problems, extensive data review, and high risk of management/treatment.  Time was spent on the following: Prep Time, Time Directly with Patient/Family/Caregiver, Documentation Time. Total time spent: 35      Data  Diagnostic tests and information reviewed for today's visit:  Most recent labs and imaging results, Medications       Some elements copied from Palliative Care note on 2/16/2024, the elements have been updated and all reflect current decision making from today, 5/15/2024.      Plan of Care discussed with: Patient    SIGNATURE: MORRO Chaney-CNP    Contact information:  Supportive and Palliative Oncology  Monday-Friday 8 AM-5 PM  Phone:  [phone], press option #5, then option #1.   Or Epic Secure Chat        absent

## 2024-05-15 ENCOUNTER — TELEPHONE (OUTPATIENT)
Dept: PALLIATIVE MEDICINE | Facility: CLINIC | Age: 44
End: 2024-05-15

## 2024-05-15 ENCOUNTER — OFFICE VISIT (OUTPATIENT)
Dept: PALLIATIVE MEDICINE | Facility: HOSPITAL | Age: 44
End: 2024-05-15
Payer: COMMERCIAL

## 2024-05-15 ENCOUNTER — TELEPHONE (OUTPATIENT)
Dept: HEMATOLOGY/ONCOLOGY | Facility: HOSPITAL | Age: 44
End: 2024-05-15

## 2024-05-15 VITALS
SYSTOLIC BLOOD PRESSURE: 122 MMHG | BODY MASS INDEX: 42.98 KG/M2 | RESPIRATION RATE: 16 BRPM | OXYGEN SATURATION: 98 % | DIASTOLIC BLOOD PRESSURE: 89 MMHG | HEART RATE: 91 BPM | WEIGHT: 209.2 LBS | TEMPERATURE: 96.8 F

## 2024-05-15 DIAGNOSIS — Z79.891 ENCOUNTER FOR MONITORING OPIOID MAINTENANCE THERAPY: ICD-10-CM

## 2024-05-15 DIAGNOSIS — F41.9 ANXIETY AND DEPRESSION: ICD-10-CM

## 2024-05-15 DIAGNOSIS — G89.3 CANCER RELATED PAIN: ICD-10-CM

## 2024-05-15 DIAGNOSIS — G62.9 POLYNEUROPATHY: ICD-10-CM

## 2024-05-15 DIAGNOSIS — Z51.81 ENCOUNTER FOR MONITORING OPIOID MAINTENANCE THERAPY: ICD-10-CM

## 2024-05-15 DIAGNOSIS — C81.90 HODGKIN LYMPHOMA, UNSPECIFIED HODGKIN LYMPHOMA TYPE, UNSPECIFIED BODY REGION (MULTI): ICD-10-CM

## 2024-05-15 DIAGNOSIS — G89.3 CHRONIC PAIN DUE TO NEOPLASM: ICD-10-CM

## 2024-05-15 DIAGNOSIS — Z51.5 ENCOUNTER FOR PALLIATIVE CARE: Primary | ICD-10-CM

## 2024-05-15 DIAGNOSIS — F32.A ANXIETY AND DEPRESSION: ICD-10-CM

## 2024-05-15 DIAGNOSIS — M19.90 ARTHRITIS: ICD-10-CM

## 2024-05-15 DIAGNOSIS — K59.09 CHRONIC CONSTIPATION: ICD-10-CM

## 2024-05-15 DIAGNOSIS — M54.2 CHRONIC CERVICAL PAIN: ICD-10-CM

## 2024-05-15 DIAGNOSIS — Z51.5 PALLIATIVE CARE ENCOUNTER: ICD-10-CM

## 2024-05-15 DIAGNOSIS — M79.7 FIBROMYALGIA: ICD-10-CM

## 2024-05-15 DIAGNOSIS — G89.29 CHRONIC CERVICAL PAIN: ICD-10-CM

## 2024-05-15 LAB
AMPHETAMINES UR QL SCN: ABNORMAL
BARBITURATES UR QL SCN: ABNORMAL
BENZODIAZ UR QL SCN: ABNORMAL
BZE UR QL SCN: ABNORMAL
CANNABINOIDS UR QL SCN: ABNORMAL
FENTANYL+NORFENTANYL UR QL SCN: ABNORMAL
METHADONE UR QL SCN: ABNORMAL
METHADONE UR QL SCN: ABNORMAL
OPIATES UR QL SCN: ABNORMAL
OXYCODONE+OXYMORPHONE UR QL SCN: ABNORMAL
PCP UR QL SCN: ABNORMAL

## 2024-05-15 PROCEDURE — 99214 OFFICE O/P EST MOD 30 MIN: CPT | Performed by: NURSE PRACTITIONER

## 2024-05-15 PROCEDURE — 80365 DRUG SCREENING OXYCODONE: CPT | Performed by: NURSE PRACTITIONER

## 2024-05-15 PROCEDURE — 3008F BODY MASS INDEX DOCD: CPT | Performed by: NURSE PRACTITIONER

## 2024-05-15 PROCEDURE — 80307 DRUG TEST PRSMV CHEM ANLYZR: CPT | Mod: 91 | Performed by: NURSE PRACTITIONER

## 2024-05-15 PROCEDURE — 80307 DRUG TEST PRSMV CHEM ANLYZR: CPT | Performed by: NURSE PRACTITIONER

## 2024-05-15 PROCEDURE — 80358 DRUG SCREENING METHADONE: CPT | Performed by: NURSE PRACTITIONER

## 2024-05-15 RX ORDER — TOPIRAMATE 100 MG/1
100 TABLET, COATED ORAL 3 TIMES DAILY
Qty: 90 TABLET | Refills: 2 | Status: SHIPPED | OUTPATIENT
Start: 2024-05-15 | End: 2024-08-13

## 2024-05-15 RX ORDER — OXYCODONE HYDROCHLORIDE 5 MG/1
5 TABLET ORAL EVERY 8 HOURS PRN
Qty: 90 TABLET | Refills: 0 | Status: SHIPPED | OUTPATIENT
Start: 2024-05-15 | End: 2024-06-14

## 2024-05-15 RX ORDER — METHADONE HYDROCHLORIDE 5 MG/1
TABLET ORAL
Qty: 45 TABLET | Refills: 0 | Status: SHIPPED | OUTPATIENT
Start: 2024-05-15 | End: 2024-06-14

## 2024-05-15 ASSESSMENT — PAIN SCALES - GENERAL: PAINLEVEL_OUTOF10: 3

## 2024-05-15 NOTE — TELEPHONE ENCOUNTER
Called [facility], and clarified that generic Topiramate is okay. No further needs

## 2024-05-21 LAB
6MAM UR CFM-MCNC: <25 NG/ML
CODEINE UR CFM-MCNC: <50 NG/ML
EDDP UR CFM-MCNC: >1000 NG/ML
HYDROCODONE CTO UR CFM-MCNC: <25 NG/ML
HYDROMORPHONE UR CFM-MCNC: <25 NG/ML
METHADONE UR CFM-MCNC: >1000 NG/ML
MORPHINE UR CFM-MCNC: <50 NG/ML
NORHYDROCODONE UR CFM-MCNC: <25 NG/ML
NOROXYCODONE UR CFM-MCNC: >1000 NG/ML
OXYCODONE UR CFM-MCNC: 398 NG/ML
OXYMORPHONE UR CFM-MCNC: 328 NG/ML

## 2024-06-22 ENCOUNTER — PATIENT MESSAGE (OUTPATIENT)
Dept: OBSTETRICS AND GYNECOLOGY | Facility: CLINIC | Age: 44
End: 2024-06-22
Payer: COMMERCIAL

## 2024-06-22 DIAGNOSIS — N95.1 MENOPAUSAL SYMPTOMS: ICD-10-CM

## 2024-06-24 RX ORDER — ESTRADIOL 0.04 MG/D
1 FILM, EXTENDED RELEASE TRANSDERMAL 2 TIMES WEEKLY
Qty: 8 PATCH | Refills: 11 | Status: SHIPPED | OUTPATIENT
Start: 2024-06-24 | End: 2025-06-24

## 2024-06-24 RX ORDER — ESTRADIOL 0.04 MG/D
1 FILM, EXTENDED RELEASE TRANSDERMAL 2 TIMES WEEKLY
Qty: 8 PATCH | Refills: 11 | Status: SHIPPED | OUTPATIENT
Start: 2024-06-24 | End: 2024-06-24

## 2024-06-27 ENCOUNTER — TELEPHONE (OUTPATIENT)
Dept: ADMISSION | Facility: HOSPITAL | Age: 44
End: 2024-06-27
Payer: COMMERCIAL

## 2024-06-27 DIAGNOSIS — G89.3 CANCER RELATED PAIN: ICD-10-CM

## 2024-06-27 DIAGNOSIS — G89.3 CHRONIC PAIN DUE TO NEOPLASM: ICD-10-CM

## 2024-06-27 RX ORDER — METHADONE HYDROCHLORIDE 5 MG/1
TABLET ORAL
Qty: 45 TABLET | Refills: 0 | Status: SHIPPED | OUTPATIENT
Start: 2024-06-27 | End: 2024-07-27

## 2024-06-27 RX ORDER — OXYCODONE HYDROCHLORIDE 5 MG/1
5 TABLET ORAL EVERY 8 HOURS PRN
Qty: 90 TABLET | Refills: 0 | Status: SHIPPED | OUTPATIENT
Start: 2024-06-27 | End: 2024-07-27

## 2024-06-27 NOTE — TELEPHONE ENCOUNTER
Pt is requesting a refill on her Oxycodone 5mg and Methadone 5mg to be sent to [facility] in [place] on file. Message sent to the [facility] care team.

## 2024-06-27 NOTE — TELEPHONE ENCOUNTER
OARRS reviewed and no aberrancy noted. Prescription pended to provider to approve. FUV with Mishel Cantu 8/15/24.

## 2024-07-24 ENCOUNTER — TELEPHONE (OUTPATIENT)
Dept: ADMISSION | Facility: HOSPITAL | Age: 44
End: 2024-07-24
Payer: COMMERCIAL

## 2024-07-24 DIAGNOSIS — G89.3 CHRONIC PAIN DUE TO NEOPLASM: ICD-10-CM

## 2024-07-24 DIAGNOSIS — Z51.5 ENCOUNTER FOR PALLIATIVE CARE: ICD-10-CM

## 2024-07-24 DIAGNOSIS — F41.9 ANXIETY: ICD-10-CM

## 2024-07-24 DIAGNOSIS — G89.3 CANCER RELATED PAIN: ICD-10-CM

## 2024-07-24 RX ORDER — METHADONE HYDROCHLORIDE 5 MG/1
TABLET ORAL
Qty: 45 TABLET | Refills: 0 | Status: SHIPPED | OUTPATIENT
Start: 2024-07-24 | End: 2024-08-23

## 2024-07-24 RX ORDER — OXYCODONE HYDROCHLORIDE 5 MG/1
5 TABLET ORAL EVERY 8 HOURS PRN
Qty: 90 TABLET | Refills: 0 | Status: SHIPPED | OUTPATIENT
Start: 2024-07-24 | End: 2024-08-23

## 2024-07-24 RX ORDER — LORAZEPAM 0.5 MG/1
0.5 TABLET ORAL 3 TIMES DAILY PRN
Qty: 90 TABLET | Refills: 2 | Status: SHIPPED | OUTPATIENT
Start: 2024-07-24 | End: 2024-10-22

## 2024-07-24 NOTE — TELEPHONE ENCOUNTER
Isaias Chamorro called the refill line for Methadone 5mg, Oxycodone 5mg and Lorazepam 0.5mg . Would like refills to be sent to [facility] pharmacy on file. Message sent to Palliative Care team to send in.

## 2024-07-24 NOTE — TELEPHONE ENCOUNTER
Patient last seen by MORRO Cantu on 5/15 with plan to continue methadone 2.5mg QAM and 5mg QPM and continue oxycodone 2.5-5mg q8h PRN and continue ativan 0.5mg, 1/2-1 tab q8h PRN. Follow up visit is scheduled for 8/14. OARRS reviewed and no aberrancy noted. Patient last filled oxycodone 5mg #90/30 days on 6/27 and methadone 5mg #45/30 days on 6/26. Patient also filled ativan 0.5mg #90/30 days on 6/26. Prescriptions pended to provider to approve.

## 2024-07-30 DIAGNOSIS — Z94.81 STATUS POST BONE MARROW TRANSPLANT (MULTI): ICD-10-CM

## 2024-08-05 ENCOUNTER — INFUSION (OUTPATIENT)
Dept: HEMATOLOGY/ONCOLOGY | Facility: CLINIC | Age: 44
End: 2024-08-05
Payer: COMMERCIAL

## 2024-08-05 DIAGNOSIS — C81.91 HODGKIN LYMPHOMA OF LYMPH NODES OF NECK, UNSPECIFIED HODGKIN LYMPHOMA TYPE (MULTI): Primary | ICD-10-CM

## 2024-08-05 DIAGNOSIS — C81.90 HODGKIN LYMPHOMA, UNSPECIFIED HODGKIN LYMPHOMA TYPE, UNSPECIFIED BODY REGION (MULTI): ICD-10-CM

## 2024-08-05 DIAGNOSIS — Z94.81 STATUS POST BONE MARROW TRANSPLANT (MULTI): ICD-10-CM

## 2024-08-05 LAB
ALBUMIN SERPL BCP-MCNC: 4 G/DL (ref 3.4–5)
ALP SERPL-CCNC: 89 U/L (ref 33–110)
ALT SERPL W P-5'-P-CCNC: 12 U/L (ref 7–45)
ANION GAP SERPL CALC-SCNC: 11 MMOL/L (ref 10–20)
AST SERPL W P-5'-P-CCNC: 16 U/L (ref 9–39)
BASOPHILS # BLD AUTO: 0.04 X10*3/UL (ref 0–0.1)
BASOPHILS NFR BLD AUTO: 1 %
BILIRUB SERPL-MCNC: 0.4 MG/DL (ref 0–1.2)
BUN SERPL-MCNC: 18 MG/DL (ref 6–23)
CALCIUM SERPL-MCNC: 8.8 MG/DL (ref 8.6–10.3)
CHLORIDE SERPL-SCNC: 109 MMOL/L (ref 98–107)
CO2 SERPL-SCNC: 24 MMOL/L (ref 21–32)
CREAT SERPL-MCNC: 0.61 MG/DL (ref 0.5–1.05)
EGFRCR SERPLBLD CKD-EPI 2021: >90 ML/MIN/1.73M*2
EOSINOPHIL # BLD AUTO: 0.1 X10*3/UL (ref 0–0.7)
EOSINOPHIL NFR BLD AUTO: 2.6 %
ERYTHROCYTE [DISTWIDTH] IN BLOOD BY AUTOMATED COUNT: 15.6 % (ref 11.5–14.5)
GLUCOSE SERPL-MCNC: 94 MG/DL (ref 74–99)
HCT VFR BLD AUTO: 38.7 % (ref 36–46)
HGB BLD-MCNC: 12.9 G/DL (ref 12–16)
IMM GRANULOCYTES # BLD AUTO: 0 X10*3/UL (ref 0–0.7)
IMM GRANULOCYTES NFR BLD AUTO: 0 % (ref 0–0.9)
LDH SERPL L TO P-CCNC: 133 U/L (ref 84–246)
LYMPHOCYTES # BLD AUTO: 1.39 X10*3/UL (ref 1.2–4.8)
LYMPHOCYTES NFR BLD AUTO: 35.5 %
MAGNESIUM SERPL-MCNC: 2.13 MG/DL (ref 1.6–2.4)
MCH RBC QN AUTO: 28.3 PG (ref 26–34)
MCHC RBC AUTO-ENTMCNC: 33.3 G/DL (ref 32–36)
MCV RBC AUTO: 85 FL (ref 80–100)
MONOCYTES # BLD AUTO: 0.4 X10*3/UL (ref 0.1–1)
MONOCYTES NFR BLD AUTO: 10.2 %
NEUTROPHILS # BLD AUTO: 1.99 X10*3/UL (ref 1.2–7.7)
NEUTROPHILS NFR BLD AUTO: 50.7 %
NRBC BLD-RTO: 0 /100 WBCS (ref 0–0)
PLATELET # BLD AUTO: 158 X10*3/UL (ref 150–450)
POTASSIUM SERPL-SCNC: 4.2 MMOL/L (ref 3.5–5.3)
PROT SERPL-MCNC: 6.7 G/DL (ref 6.4–8.2)
RBC # BLD AUTO: 4.56 X10*6/UL (ref 4–5.2)
SODIUM SERPL-SCNC: 140 MMOL/L (ref 136–145)
URATE SERPL-MCNC: 4.4 MG/DL (ref 2.3–6.7)
WBC # BLD AUTO: 3.9 X10*3/UL (ref 4.4–11.3)

## 2024-08-05 PROCEDURE — 80053 COMPREHEN METABOLIC PANEL: CPT | Performed by: NURSE PRACTITIONER

## 2024-08-05 PROCEDURE — 2500000004 HC RX 250 GENERAL PHARMACY W/ HCPCS (ALT 636 FOR OP/ED): Performed by: NURSE PRACTITIONER

## 2024-08-05 PROCEDURE — 85025 COMPLETE CBC W/AUTO DIFF WBC: CPT | Performed by: NURSE PRACTITIONER

## 2024-08-05 PROCEDURE — 83615 LACTATE (LD) (LDH) ENZYME: CPT | Mod: WESLAB | Performed by: NURSE PRACTITIONER

## 2024-08-05 PROCEDURE — 83735 ASSAY OF MAGNESIUM: CPT | Performed by: NURSE PRACTITIONER

## 2024-08-05 PROCEDURE — 36591 DRAW BLOOD OFF VENOUS DEVICE: CPT

## 2024-08-05 PROCEDURE — 84550 ASSAY OF BLOOD/URIC ACID: CPT | Mod: WESLAB | Performed by: NURSE PRACTITIONER

## 2024-08-05 RX ORDER — HEPARIN 100 UNIT/ML
500 SYRINGE INTRAVENOUS AS NEEDED
OUTPATIENT
Start: 2024-08-05

## 2024-08-05 RX ORDER — HEPARIN SODIUM,PORCINE/PF 10 UNIT/ML
50 SYRINGE (ML) INTRAVENOUS AS NEEDED
OUTPATIENT
Start: 2024-08-05

## 2024-08-05 RX ORDER — HEPARIN SODIUM,PORCINE/PF 10 UNIT/ML
50 SYRINGE (ML) INTRAVENOUS AS NEEDED
Status: DISCONTINUED | OUTPATIENT
Start: 2024-08-05 | End: 2024-08-05 | Stop reason: HOSPADM

## 2024-08-05 RX ORDER — HEPARIN 100 UNIT/ML
500 SYRINGE INTRAVENOUS AS NEEDED
Status: DISCONTINUED | OUTPATIENT
Start: 2024-08-05 | End: 2024-08-05 | Stop reason: HOSPADM

## 2024-08-09 NOTE — PROGRESS NOTES
SUPPORTIVE AND PALLIATIVE ONCOLOGY OUTPATIENT FOLLOW-UP      SERVICE DATE: 8/14/2024    Subjective   HISTORY OF PRESENT ILLNESS: Isaias Chamorro is a 43 y.o. female who presents with past medical history of Hodgkin's Lymphoma, originally diagnosed June 2013, with recent relapse in March of 2021.  She received RT therapy to paraspinal mass, was pursuing Pembrolizumab but developed pneumonitis. Currently on surveillance.   Patient follows with Supportive Oncology for pain and further symptom management.      Pain Assessment:  Pain Score:  3  Location:  neck,upper back, shoulders  Description:  aching, spasming, sharp    Symptom Assessment:  Pain:a little - reports pain continues but overall well controlled. Notes more recently with packing to move into a new home, she has increased activity which is increased pain and back spasms some. Tizanidine is helpful, takes a few times a week. Continues Oxycodone roughly 2x a day. And Methadone 2.5mg AM and 5mg PM, with Topiramate 100mg 2-3x a day. Has not been going to acupuncture/massage recently with being very busy due to moving. Will look to restart once she is settled back into her new home  Numbness or Tingling in hands/feet/other: a little  Sore Muscles/Spasms: a little  Headache: none  Dizziness:none  Constipation: a little  Diarrhea: none  Nausea: none  Vomiting: none  Lack of Appetite: none   Weight Loss: intentional  Taste changes: none  Dry Mouth: none  Pain in Mouth/Swallowing: none  Lack of Energy: a little  Difficulty Sleeping: none  Worrying: none  Anxiety: a little  Depression: none  Shortness of breath: none  Other: none      Information obtained from: chart review and interview of patient  ______________________________________________________________________        Objective      Latest Reference Range & Units 05/15/24 13:45   Oxycodone <25 ng/mL 398 (H)   Methadone <25 ng/mL >1,000 (H)   Amphetamine Screen, Urine Presumptive Negative   "Presumptive Negative   Barbiturate Screen, Urine Presumptive Negative  Presumptive Negative   PCP Screen, Urine Presumptive Negative  Presumptive Negative   Opiate Screen, Urine Presumptive Negative  Presumptive Negative   Cannabinoid Screen, Urine Presumptive Negative  Presumptive Negative   Morphine  <50 ng/mL <50   Fentanyl Screen, Urine Presumptive Negative  Presumptive Negative   Oxycodone Screen, Urine Presumptive Negative  Presumptive Positive !   Benzodiazepines Screen, Urine Presumptive Negative  Presumptive Negative   Methadone Screen, Urine Presumptive Negative  Presumptive Positive !   Cocaine Metabolite Screen, Urine Presumptive Negative  Presumptive Negative   6-Acetylmorphine Urine <25 ng/mL <25   Hydrocodone Saliva <25 ng/mL <25   Hydromorphone Urine <25 ng/mL <25   Norhydrocodone Urine <25 ng/mL <25   Oxymorphone Urine <25 ng/mL 328 (H)   Noroxycodone Urine <25 ng/mL >1,000 (H)   EDDP <25 ng/mL >1,000 (H)   (H): Data is abnormally high  !: Data is abnormal    PHYSICAL EXAMINATION   Vital Signs:   Vital signs reviewed      12/29/2023     1:14 PM 1/2/2024     1:11 PM 3/6/2024    12:50 PM 3/29/2024    11:27 AM 4/12/2024    11:10 AM 5/15/2024     1:09 PM 8/14/2024    12:59 PM   Vitals   Systolic 141  117 122 104 122 116   Diastolic 93  82 81 74 89 80   Heart Rate 92  86 87  91 88   Temp 36.6 °C (97.9 °F)   36.1 °C (97 °F)  36 °C (96.8 °F) 36.2 °C (97.2 °F)   Resp 18   18  16 20   Height (in)   1.486 m (4' 10.5\")  1.486 m (4' 10.5\")     Weight (lb) 210.4 208 211 218.04 215 209.2 201.28   BMI 42.5 kg/m2 42.01 kg/m2 43.35 kg/m2 44.79 kg/m2 44.17 kg/m2 42.98 kg/m2 41.35 kg/m2   BSA (m2) 1.99 m2 1.98 m2 1.99 m2 2.02 m2 2.01 m2 1.98 m2 1.94 m2   Visit Report Report    Report  Report Report Report Report Report     Physical Exam  Vitals reviewed.   Constitutional:       Appearance: Normal appearance.   HENT:      Head: Normocephalic.   Cardiovascular:      Rate and Rhythm: Normal rate and regular rhythm. "   Pulmonary:      Effort: Pulmonary effort is normal.   Abdominal:      General: Abdomen is flat. Bowel sounds are normal.      Palpations: Abdomen is soft.   Musculoskeletal:         General: Normal range of motion.   Skin:     General: Skin is warm and dry.   Neurological:      General: No focal deficit present.      Mental Status: She is alert and oriented to person, place, and time. Mental status is at baseline.   Psychiatric:         Mood and Affect: Mood normal.         Behavior: Behavior normal.         Thought Content: Thought content normal.         Judgment: Judgment normal.       ASSESSMENT/PLAN    Pain  Pain is: chronic - post cancer therapy  Type: somatic and neuropathic  Home regimen:   - Increase Methadone 5mg BID  - EKG (12/29/23) . Redo with each Methadone increase  - Continue Oxycodone 2.5-5mg [id number] PRN - discussed with increase in Methadone, goal in near future to decrease Oxycodone to BID   - Continue Tizanidine 2mg 1-2x per day for muscle spasms  - Continue Topiramate 100mg TID  - Following with Dr Beebe - has not been following recently - encouraged to return with good assistance on her pain/anxiety  - Pursuing massage therapy/stretching  - Restarting Aquatic Therapy/PT  Intolerances/previously tried:   - Unable to take NSAIDs d/t bariatric surgery and Abreu's Esophagus  - Gabapentin - did not tolerate - excessive weight gain  - Pregabalin - did not tolerate  - Duloxetine - did not tolerate in past  - D/C Nortriptyline 10mg once daily at bed due to concerns of serotonin syndrome      Opioid Use  Medication Management:   - OARRS report reviewed with no aberrant behavior; consistent with  prescriptions/records and patient history  - MED 45.  Overdose Risk Score 200.   This has been discussed with patient.   - We will continue to closely monitor the patient for signs of prescription misuse including UDS, OARRS review and subjective reports at each visit.  - Concurrent benzodiazepine use  with lorazepam, educated on risks.  - I am a provider who either is or has consulted and collaborated with a provider certified in Hospice and Palliative Medicine and have conducted a face-face visit and examination for this patient.  - Routine Urine Drug Screen: 5/15/2024 appropriately + for prescribed medications and - for illicits.  - Controlled Substance Agreement: 5/15/2024  - Specifically discussed that controlled substance prescriptions will only be provided by our group as outlined in the completed agreement  - Prescribed naloxone: 5/6/22  - Red Flags: none     Constipation (Chronic in nature)  At risk for constipation related to opioids.  Home regimen:   - Following with Gastro Dr. Borrero  - Continue Linzess 145mcg daily  - Continue Senna-S PRN  - Continue Milk of Magnesia 24% Concentrate - 10mL once daily PRN for severe constipation  - Encouraged trying Bisacoydl suppository in addition for further relief     Nausea (improved)  - Continue Ondansetron 8mg [id number] PRN  - Continue Lorazepam 0.5mg, 1/2-1 tab q8 hours PRN     Altered Mood  Chronic anxiety and depression related to hx of bipolar disorder .  Home regimen:   - Managed by outside provider - appreciate recs  - Following with trauma counselor  - Pursuing acupuncture/massage  - Continue Lamictal 75mg daily  - Continue Sertraline 150mg daily  - Continue Lorazepam 0.5mg, 1/2 to 1 tablet [id number] PRN for anxiety  - Continue Abilify as prescribed by psychiatrist     Supportive and Palliative Oncology Encounter:  Emotional support provided  Coordination of care  Will continue to follow and address symptoms as needed    Next Follow-Up Visit:  Return to clinic in 3 months    Signature and billing  Medical complexity was moderate level due to due to complexity of problems, extensive data review, and high risk of management/treatment.  Time was spent on the following: Prep Time, Time Directly with Patient/Family/Caregiver, Documentation Time. Total time spent:  35      Data  Diagnostic tests and information reviewed for today's visit:  Most recent labs and imaging results, Medications     Some elements copied from Palliative Care note on 5/15/2024, the elements have been updated and all reflect current decision making from today, 8/14/2024.      Plan of Care discussed with: Patient    SIGNATURE: MORRO Chaney-CNP    Contact information:  Supportive and Palliative Oncology  Monday-Friday 8 AM-5 PM  Phone:  [phone], press option #5, then option #1.   Or Epic Secure Chat

## 2024-08-14 ENCOUNTER — OFFICE VISIT (OUTPATIENT)
Dept: PALLIATIVE MEDICINE | Facility: HOSPITAL | Age: 44
End: 2024-08-14
Payer: MEDICARE

## 2024-08-14 VITALS
SYSTOLIC BLOOD PRESSURE: 116 MMHG | RESPIRATION RATE: 20 BRPM | WEIGHT: 201.28 LBS | OXYGEN SATURATION: 98 % | HEART RATE: 88 BPM | BODY MASS INDEX: 41.35 KG/M2 | DIASTOLIC BLOOD PRESSURE: 80 MMHG | TEMPERATURE: 97.2 F

## 2024-08-14 DIAGNOSIS — G89.3 CANCER RELATED PAIN: ICD-10-CM

## 2024-08-14 DIAGNOSIS — M19.90 ARTHRITIS: ICD-10-CM

## 2024-08-14 DIAGNOSIS — M79.7 FIBROMYALGIA: ICD-10-CM

## 2024-08-14 DIAGNOSIS — F41.9 ANXIETY AND DEPRESSION: ICD-10-CM

## 2024-08-14 DIAGNOSIS — G89.3 CHRONIC PAIN DUE TO NEOPLASM: ICD-10-CM

## 2024-08-14 DIAGNOSIS — F32.A ANXIETY AND DEPRESSION: ICD-10-CM

## 2024-08-14 DIAGNOSIS — F41.9 ANXIETY: ICD-10-CM

## 2024-08-14 DIAGNOSIS — Z51.5 PALLIATIVE CARE ENCOUNTER: Primary | ICD-10-CM

## 2024-08-14 DIAGNOSIS — Z79.891 ENCOUNTER FOR MONITORING OPIOID MAINTENANCE THERAPY: ICD-10-CM

## 2024-08-14 DIAGNOSIS — K59.09 CHRONIC CONSTIPATION: ICD-10-CM

## 2024-08-14 DIAGNOSIS — G62.9 POLYNEUROPATHY: ICD-10-CM

## 2024-08-14 DIAGNOSIS — Z51.81 ENCOUNTER FOR MONITORING OPIOID MAINTENANCE THERAPY: ICD-10-CM

## 2024-08-14 DIAGNOSIS — G89.29 CHRONIC CERVICAL PAIN: ICD-10-CM

## 2024-08-14 DIAGNOSIS — M54.2 CHRONIC CERVICAL PAIN: ICD-10-CM

## 2024-08-14 DIAGNOSIS — C81.90 HODGKIN LYMPHOMA, UNSPECIFIED HODGKIN LYMPHOMA TYPE, UNSPECIFIED BODY REGION (MULTI): ICD-10-CM

## 2024-08-14 PROCEDURE — 99214 OFFICE O/P EST MOD 30 MIN: CPT | Performed by: NURSE PRACTITIONER

## 2024-08-14 PROCEDURE — 1036F TOBACCO NON-USER: CPT | Performed by: NURSE PRACTITIONER

## 2024-08-14 RX ORDER — OXYCODONE HYDROCHLORIDE 5 MG/1
5 TABLET ORAL EVERY 8 HOURS PRN
Qty: 90 TABLET | Refills: 0 | Status: SHIPPED | OUTPATIENT
Start: 2024-08-25 | End: 2024-09-24

## 2024-08-14 RX ORDER — METHADONE HYDROCHLORIDE 5 MG/1
5 TABLET ORAL EVERY 12 HOURS
Qty: 60 TABLET | Refills: 0 | Status: SHIPPED | OUTPATIENT
Start: 2024-08-14 | End: 2024-09-13

## 2024-08-14 ASSESSMENT — PAIN SCALES - GENERAL: PAINLEVEL: 3

## 2024-08-30 ENCOUNTER — TELEPHONE (OUTPATIENT)
Dept: ADMISSION | Facility: HOSPITAL | Age: 44
End: 2024-08-30
Payer: COMMERCIAL

## 2024-08-30 DIAGNOSIS — G89.3 CHRONIC PAIN DUE TO NEOPLASM: ICD-10-CM

## 2024-08-30 RX ORDER — METHADONE HYDROCHLORIDE 5 MG/1
5 TABLET ORAL EVERY 12 HOURS
Qty: 60 TABLET | Refills: 0 | Status: SHIPPED | OUTPATIENT
Start: 2024-08-30 | End: 2024-09-29

## 2024-08-30 NOTE — TELEPHONE ENCOUNTER
Isaias Chamorro called the refill line for Methadone BID. Would like refills to be sent to [facility] pharmacy on file. Message sent to Palliative Care team to send    OARRS report reviewed and reflects  prescription history, no aberrancy noted. Per OARRS, patient last filled Methadone 5 mg 7/27/24. Per last visit with Mishel Cantu 8/14/24 patient to continue medication. Patient with follow up visit scheduled with Mishel on 11/20/24. Patient updated that medication will be sent to [facility]. Refill request routed to provider.

## 2024-09-16 ENCOUNTER — APPOINTMENT (OUTPATIENT)
Dept: HEMATOLOGY/ONCOLOGY | Facility: CLINIC | Age: 44
End: 2024-09-16
Payer: COMMERCIAL

## 2024-09-16 ENCOUNTER — APPOINTMENT (OUTPATIENT)
Dept: HEMATOLOGY/ONCOLOGY | Facility: CLINIC | Age: 44
End: 2024-09-16
Payer: MEDICARE

## 2024-09-18 DIAGNOSIS — Z79.899 PHARMACOLOGIC THERAPY: Primary | ICD-10-CM

## 2024-09-24 ENCOUNTER — INFUSION (OUTPATIENT)
Dept: HEMATOLOGY/ONCOLOGY | Facility: CLINIC | Age: 44
End: 2024-09-24
Payer: MEDICARE

## 2024-09-24 VITALS
HEIGHT: 59 IN | WEIGHT: 201 LBS | OXYGEN SATURATION: 97 % | TEMPERATURE: 97.2 F | RESPIRATION RATE: 16 BRPM | SYSTOLIC BLOOD PRESSURE: 123 MMHG | BODY MASS INDEX: 40.52 KG/M2 | HEART RATE: 80 BPM | DIASTOLIC BLOOD PRESSURE: 74 MMHG

## 2024-09-24 DIAGNOSIS — C81.91 HODGKIN LYMPHOMA OF LYMPH NODES OF NECK, UNSPECIFIED HODGKIN LYMPHOMA TYPE (MULTI): ICD-10-CM

## 2024-09-24 DIAGNOSIS — C81.90 HODGKIN LYMPHOMA, UNSPECIFIED HODGKIN LYMPHOMA TYPE, UNSPECIFIED BODY REGION (MULTI): ICD-10-CM

## 2024-09-24 PROCEDURE — 2500000004 HC RX 250 GENERAL PHARMACY W/ HCPCS (ALT 636 FOR OP/ED): Performed by: NURSE PRACTITIONER

## 2024-09-24 PROCEDURE — 96523 IRRIG DRUG DELIVERY DEVICE: CPT

## 2024-09-24 RX ORDER — HEPARIN SODIUM,PORCINE/PF 10 UNIT/ML
50 SYRINGE (ML) INTRAVENOUS AS NEEDED
OUTPATIENT
Start: 2024-09-24

## 2024-09-24 RX ORDER — HEPARIN 100 UNIT/ML
500 SYRINGE INTRAVENOUS AS NEEDED
Status: DISCONTINUED | OUTPATIENT
Start: 2024-09-24 | End: 2024-09-24 | Stop reason: HOSPADM

## 2024-09-24 RX ORDER — HEPARIN 100 UNIT/ML
500 SYRINGE INTRAVENOUS AS NEEDED
OUTPATIENT
Start: 2024-09-24

## 2024-09-24 ASSESSMENT — PAIN SCALES - GENERAL: PAINLEVEL: 0-NO PAIN

## 2024-09-25 ENCOUNTER — TELEPHONE (OUTPATIENT)
Dept: ADMISSION | Facility: HOSPITAL | Age: 44
End: 2024-09-25
Payer: COMMERCIAL

## 2024-09-25 DIAGNOSIS — G62.9 POLYNEUROPATHY: Primary | ICD-10-CM

## 2024-09-25 DIAGNOSIS — Z51.5 ENCOUNTER FOR PALLIATIVE CARE: ICD-10-CM

## 2024-09-25 DIAGNOSIS — G89.3 CHRONIC PAIN DUE TO NEOPLASM: ICD-10-CM

## 2024-09-25 DIAGNOSIS — G89.3 CANCER RELATED PAIN: ICD-10-CM

## 2024-09-25 RX ORDER — OXYCODONE HYDROCHLORIDE 5 MG/1
5 TABLET ORAL EVERY 8 HOURS PRN
Qty: 90 TABLET | Refills: 0 | Status: SHIPPED | OUTPATIENT
Start: 2024-09-28 | End: 2024-10-28

## 2024-09-25 RX ORDER — METHADONE HYDROCHLORIDE 5 MG/1
5 TABLET ORAL EVERY 12 HOURS
Qty: 60 TABLET | Refills: 0 | Status: SHIPPED | OUTPATIENT
Start: 2024-09-28 | End: 2024-10-28

## 2024-09-25 RX ORDER — TOPIRAMATE 100 MG/1
100 TABLET, COATED ORAL 3 TIMES DAILY
Qty: 90 TABLET | Refills: 2 | Status: SHIPPED | OUTPATIENT
Start: 2024-09-25 | End: 2024-09-25 | Stop reason: CLARIF

## 2024-09-25 RX ORDER — TOPIRAMATE 100 MG/1
100 TABLET, FILM COATED ORAL 3 TIMES DAILY
Qty: 90 TABLET | Refills: 2 | Status: SHIPPED | OUTPATIENT
Start: 2024-09-25 | End: 2024-12-24

## 2024-09-25 NOTE — TELEPHONE ENCOUNTER
Patient last seen by MORRO Cantu on 8/14 with plan to increase methadone to 5mg BID, continue oxycodone 2.5-5mg q8h PRN, and continue Topamax 100mg TID. Follow up visit is scheduled for 11/20. OARRS reviewed and no aberrancy noted. Patient last filled methadone 5mg #60/30 days on 8/30 and oxycodone 5mg 90/30 days on 8/30. Prescriptions pended to provider to approve.

## 2024-09-25 NOTE — TELEPHONE ENCOUNTER
Issue with rx at pharmacy. Discussed with patient who reports this happens every time she gets a refill due to being sent as brand name Topomax. Will have Mishel send as generic

## 2024-09-25 NOTE — TELEPHONE ENCOUNTER
Per [place] a PA is required for topamax 100mg. Sent to [facility] for processing.

## 2024-09-25 NOTE — TELEPHONE ENCOUNTER
Pt is requesting refills of the following medications:    Topamax 100mg TID, #90.  Oxycodone 5mg q8 prn, #90  Methadone 5mg BID, #60    Preferred pharmacy is [place] in [place]

## 2024-09-30 ENCOUNTER — TELEPHONE (OUTPATIENT)
Dept: OBSTETRICS AND GYNECOLOGY | Facility: CLINIC | Age: 44
End: 2024-09-30
Payer: COMMERCIAL

## 2024-10-11 DIAGNOSIS — E03.9 ACQUIRED HYPOTHYROIDISM: ICD-10-CM

## 2024-10-13 RX ORDER — LEVOTHYROXINE SODIUM 200 UG/1
200 TABLET ORAL
Qty: 90 TABLET | Refills: 3 | Status: SHIPPED | OUTPATIENT
Start: 2024-10-13

## 2024-10-25 ENCOUNTER — TELEMEDICINE (OUTPATIENT)
Dept: PRIMARY CARE | Facility: CLINIC | Age: 44
End: 2024-10-25
Payer: COMMERCIAL

## 2024-10-25 ENCOUNTER — TELEPHONE (OUTPATIENT)
Dept: ADMISSION | Facility: HOSPITAL | Age: 44
End: 2024-10-25

## 2024-10-25 DIAGNOSIS — G89.3 CHRONIC PAIN DUE TO NEOPLASM: ICD-10-CM

## 2024-10-25 DIAGNOSIS — J01.90 ACUTE NON-RECURRENT SINUSITIS, UNSPECIFIED LOCATION: Primary | ICD-10-CM

## 2024-10-25 DIAGNOSIS — G89.3 CANCER RELATED PAIN: ICD-10-CM

## 2024-10-25 DIAGNOSIS — F41.9 ANXIETY: ICD-10-CM

## 2024-10-25 DIAGNOSIS — Z51.5 ENCOUNTER FOR PALLIATIVE CARE: ICD-10-CM

## 2024-10-25 PROCEDURE — 99214 OFFICE O/P EST MOD 30 MIN: CPT | Performed by: FAMILY MEDICINE

## 2024-10-25 RX ORDER — METHADONE HYDROCHLORIDE 5 MG/1
5 TABLET ORAL EVERY 12 HOURS
Qty: 60 TABLET | Refills: 0 | Status: SHIPPED | OUTPATIENT
Start: 2024-10-27 | End: 2024-11-26

## 2024-10-25 RX ORDER — LORAZEPAM 0.5 MG/1
0.5 TABLET ORAL 3 TIMES DAILY PRN
Qty: 90 TABLET | Refills: 2 | Status: SHIPPED | OUTPATIENT
Start: 2024-10-25 | End: 2025-01-23

## 2024-10-25 RX ORDER — OXYCODONE HYDROCHLORIDE 5 MG/1
5 TABLET ORAL EVERY 8 HOURS PRN
Qty: 90 TABLET | Refills: 0 | Status: SHIPPED | OUTPATIENT
Start: 2024-10-27 | End: 2024-11-26

## 2024-10-25 RX ORDER — DOXYCYCLINE 100 MG/1
100 CAPSULE ORAL 2 TIMES DAILY
Qty: 14 CAPSULE | Refills: 0 | Status: SHIPPED | OUTPATIENT
Start: 2024-10-25 | End: 2024-11-01

## 2024-10-25 RX ORDER — NALOXONE HYDROCHLORIDE 4 MG/.1ML
SPRAY NASAL
COMMUNITY
Start: 2024-05-29

## 2024-10-25 ASSESSMENT — LIFESTYLE VARIABLES: HISTORY_OF_SMOKING: I HAVE NEVER SMOKED

## 2024-10-25 NOTE — PROGRESS NOTES
I performed this visit using realtime telehealth tools, including an audio/video OR telephone connection between the patient listed who was located in the [facility] and myself, Nettie Hernandez (Board certified in the [facility]).  At the start of the visit, I introduced myself as Dr. Rod and verified the patients name, , and current physical location.    If they were currently outside of the state of [place], the visit was ended and the patient was referred to alternative means for evaluation and treatment.   The patient was made aware of the limitations of the telehealth visit.  They will not be physically examined and all issues may not be appropriate for a telehealth visit.  If necessary, an in person referral will be made.      DISCLAIMER:   In preparing for this visit and writing this note, I reviewed previous electronic medical records (labs, imaging and medical charts) of the patient available in the physician portal. Significant findings which helped in decision making are recorded in this encounter charting.    All allergies were reviewed with the patient and all medications reconciled with the patient.    Sinus sxs >1 week--getting worse  H/o sinusitis--   Turned around over weekend (today if Friday)  But now worse  More copious nasal drainage  Getting more green  Going down into lungs--  Pneumonitis from keyrtruda  Pulse ox has been > 95  Tussin dm at night or nyquil  Humidifier to help  During day takes tylenol sinus  Taking allegra and singulare and flonase  In March had OE  Last sinusitis was ~1 year ago  Nausea from drainage--has zofran  No fever chills or ache    All other ROS (-)    Alert in NAD  Eyes clear  No resp distress   No coughing  Very TTP over sinuses  No LAD in neck  Affect wnl    Sinusitis--  Doxycycline capsule usually works better-BID x 7 days  Continue other meds as you have been doing    At the completion of the visit, possible diagnoses and plan was discussed with the  patient as well as recommended treatments, medications prescribed, and when to follow up for in person evaluation. All questions were answered and the patient verbalized understanding.    Limitations to telemedicine include inability to do a complete and accurate physical exam.  Any concerns regarding this were conveyed with the patient and in person follow-up recommended if patient nature of illness does not progress as anticipated during this visit.

## 2024-10-25 NOTE — PATIENT INSTRUCTIONS
"Doxycycline as written twice a day for 7 days    Please send me a [employer] message if you have any questions or concerns.  FOR NON URGENT questions only.  Allow up to 72 hours for response.    If you have prescription issues or other questions you can email   Olga Carmona  Digital Health Coordinator, at   [email or web address]     Rest and drink plenty of fluids    Tylenol and or motrin as needed for pain and fever (unless you have been told not to take these because of your personal medical history)    Discussed options and precautions (complaint specific and may include)  Viral versus bacterial infection; use of medications; possible side effects; appropriate over-the-counter medications; possible complications and /or when to follow-up.    Limitations to telemedicine include inability to do a complete and accurate physical exam.  Any concerns regarding this were conveyed with the patient and in person follow-up recommended if patient nature of illness does not progress as anticipated during this visit.  Red flags requiring in person care were discussed.     if sent for in person care at  or ED,  it was explained why and failure to have \"higher level of care\" further evaluation, and treatment today may lead, but is not limited to negative outcomes, permanent disability, or even death.     If not sent for in person care today, follow-up with your PCP in 2-3 days, sooner if not  improving.    Follow-up immediately if symptoms worsen. If experiencing symptoms including but not limited to lethargy / chest pain / weakness / dizziness / difficulty breathing please call 911 or go to the emergency department for immediate care.     All patient's questions were answered. Patient has good decision making capacity.  They are alert to person, place, time and situation.  Patient has the ability to communicate choice, understand information, consequences, and reason " rationally.    _________________________________________________________________________________________________________________  To connect with a new PCP please visit [email or web address] or call [phone]        FOR THOSE WITH SYMPTOMS OF ILLNESS/UPPER RESPIRATORY INFECTION/COVID    CDC updated Respiratory Infection guidelines:   When you have a respiratory virus, stay home and away from others (including people  you live with who are not sick) Symptoms can include fever, chills, fatigue, cough,  runny nose, and headache (and others).    You may return to work when the following 3 conditions are met:    1) No fever without the use of a fever reducer for 24 hours  2 symptoms are overall improving AND  3) symptoms are mild     When you go back to your normal activities, take added precaution over the next 5 days, such as taking additional steps for  air, hygiene, masks, physical distancing, and/or testing when you will be around other people indoors.    Keep in mind that you may still be able to spread the virus that made you sick, even if you are feeling better. You are likely to be less contagious at this time, depending on factors like how long you were sick or how sick you were.    If you develop a fever or you start to feel worse after you have gone back to normal activities, stay home and away from others again until, for at least 24 hours, both are true: your symptoms are improving overall, and you have not had a fever (and are not using fever-reducing medication). Then take added precaution for the next 5 days.    If you never had symptoms but tested positive for a respiratory virus?, you may be contagious. For the next 5 days: take added precaution, such as taking additional steps for  air, hygiene, masks, physical distancing, and/or testing when you will be around other people indoors. This is especially important to protect people with factors that increase  their risk of severe illness from respiratory viruses.  Avoid immunocompromised, elderly, pregnant women, infants etc     Over-the-counter cold and cough medications    Take Mucinex for cough, drink plenty of fluids with this medication and will help break up congestion making it easier to cough up    For cough can use honey (children ages 1 and up) in hot tea or hot water. I recommend putting this in an insulated cup and carrying it around throughout the day to sip on.  Have it at your bedside at night in case you wake up coughing.  You can also use honey cough drops (adults and older children).    Recommend nasal saline for use in children and adults.  Neti Preston can also be helpful.  (Never used tap water and a Neti pot.  Use distilled water.)    If you have plugged up congested ears or the feeling of fluid in your ears, you can use an over-the-counter nasal steroid spray like fluticasone (brand name Flonase) use 2 sprays into each nostril once or twice a day for 7 days.  This will help open up the eustachian tubes and allow the fluid to drain out of your ears.    Sleeping with your head/chest elevated can help with sinus drainage.    Adults only-can use nasal decongestant (like Afrin) at bedtime to open nasal passages so you can breathe through your nose while you sleep; avoid using for longer than 3 days as this medication can become addicting.  Do not use if you have high blood pressure or high heart rate.    For severe pain or fever in adult-Tylenol (2 extra strength) or ibuprofen (3-4 tabs equals 600 to 800 mg) alternating as needed for pain.  Tylenol doses should be 6 to 8 hours apart and ibuprofen doses should be 6 to 8 hours apart.      Common cold medications for adults explained:    **Cold medications for children are not recommended-only Tylenol, Motrin, honey (only for children older than 1 year), cool-mist humidifier, and nasal saline spray are recommended for children.    Mucinex-(generic name  guaifenesin)-is an expectorant.  This will thin out all the thick mucus.  Must drink plenty of liquids for this medicine to work.    Dextromethorphan (brand name Delsym or DM)-this medicine is a cough suppressant--caution/avoid use if taking SSRI medication because of risk of Serotonin Syndrome    Honey in hot water or tea-this is a natural cough suppressant    Decongestant nasal spray- (eg: Afrin) use for temporary relief of nasal congestion-best when used at bedtime to open up nasal passages so that you are not forced to mouth breathe overnight.    Sudafed (generic name pseudoephedrine-this must be bought from the pharmacist) DO NOT use this medicine if you have high blood pressure as it can raise your blood pressure higher.  Do not use if you have any irregular heart rate.  This medicine can help clear congestion in your sinuses.

## 2024-10-25 NOTE — TELEPHONE ENCOUNTER
OARRS reviewed with no aberrancy or concerns. Patient has follow-up scheduled 11/20/2024. Refills submitted to [facility].

## 2024-11-05 ENCOUNTER — OFFICE VISIT (OUTPATIENT)
Dept: HEMATOLOGY/ONCOLOGY | Facility: HOSPITAL | Age: 44
End: 2024-11-05
Payer: MEDICARE

## 2024-11-05 ENCOUNTER — LAB (OUTPATIENT)
Dept: HEMATOLOGY/ONCOLOGY | Facility: HOSPITAL | Age: 44
End: 2024-11-05
Payer: MEDICARE

## 2024-11-05 VITALS
WEIGHT: 211.86 LBS | TEMPERATURE: 96.6 F | DIASTOLIC BLOOD PRESSURE: 76 MMHG | HEART RATE: 69 BPM | OXYGEN SATURATION: 100 % | SYSTOLIC BLOOD PRESSURE: 131 MMHG | RESPIRATION RATE: 16 BRPM | BODY MASS INDEX: 43.52 KG/M2

## 2024-11-05 DIAGNOSIS — C81.91 HODGKIN LYMPHOMA OF LYMPH NODES OF NECK, UNSPECIFIED HODGKIN LYMPHOMA TYPE (MULTI): ICD-10-CM

## 2024-11-05 DIAGNOSIS — C81.90 HODGKIN LYMPHOMA, UNSPECIFIED HODGKIN LYMPHOMA TYPE, UNSPECIFIED BODY REGION (MULTI): ICD-10-CM

## 2024-11-05 LAB
ALBUMIN SERPL BCP-MCNC: 3.9 G/DL (ref 3.4–5)
ALP SERPL-CCNC: 98 U/L (ref 33–110)
ALT SERPL W P-5'-P-CCNC: 11 U/L (ref 7–45)
ANION GAP SERPL CALC-SCNC: 10 MMOL/L (ref 10–20)
AST SERPL W P-5'-P-CCNC: 15 U/L (ref 9–39)
BASOPHILS # BLD AUTO: 0.06 X10*3/UL (ref 0–0.1)
BASOPHILS NFR BLD AUTO: 1.3 %
BILIRUB SERPL-MCNC: 0.4 MG/DL (ref 0–1.2)
BUN SERPL-MCNC: 17 MG/DL (ref 6–23)
CALCIUM SERPL-MCNC: 8.1 MG/DL (ref 8.6–10.3)
CHLORIDE SERPL-SCNC: 110 MMOL/L (ref 98–107)
CO2 SERPL-SCNC: 22 MMOL/L (ref 21–32)
CREAT SERPL-MCNC: 0.6 MG/DL (ref 0.5–1.05)
EGFRCR SERPLBLD CKD-EPI 2021: >90 ML/MIN/1.73M*2
EOSINOPHIL # BLD AUTO: 0.1 X10*3/UL (ref 0–0.7)
EOSINOPHIL NFR BLD AUTO: 2.1 %
ERYTHROCYTE [DISTWIDTH] IN BLOOD BY AUTOMATED COUNT: 15.6 % (ref 11.5–14.5)
GLUCOSE SERPL-MCNC: 89 MG/DL (ref 74–99)
HCT VFR BLD AUTO: 34 % (ref 36–46)
HGB BLD-MCNC: 11.2 G/DL (ref 12–16)
IMM GRANULOCYTES # BLD AUTO: 0.01 X10*3/UL (ref 0–0.7)
IMM GRANULOCYTES NFR BLD AUTO: 0.2 % (ref 0–0.9)
LDH SERPL L TO P-CCNC: 144 U/L (ref 84–246)
LYMPHOCYTES # BLD AUTO: 1.27 X10*3/UL (ref 1.2–4.8)
LYMPHOCYTES NFR BLD AUTO: 26.9 %
MCH RBC QN AUTO: 29 PG (ref 26–34)
MCHC RBC AUTO-ENTMCNC: 32.9 G/DL (ref 32–36)
MCV RBC AUTO: 88 FL (ref 80–100)
MONOCYTES # BLD AUTO: 0.42 X10*3/UL (ref 0.1–1)
MONOCYTES NFR BLD AUTO: 8.9 %
NEUTROPHILS # BLD AUTO: 2.86 X10*3/UL (ref 1.2–7.7)
NEUTROPHILS NFR BLD AUTO: 60.6 %
NRBC BLD-RTO: 0 /100 WBCS (ref 0–0)
PLATELET # BLD AUTO: 205 X10*3/UL (ref 150–450)
POTASSIUM SERPL-SCNC: 3.8 MMOL/L (ref 3.5–5.3)
PROT SERPL-MCNC: 6.6 G/DL (ref 6.4–8.2)
RBC # BLD AUTO: 3.86 X10*6/UL (ref 4–5.2)
SODIUM SERPL-SCNC: 138 MMOL/L (ref 136–145)
WBC # BLD AUTO: 4.7 X10*3/UL (ref 4.4–11.3)

## 2024-11-05 PROCEDURE — 83615 LACTATE (LD) (LDH) ENZYME: CPT

## 2024-11-05 PROCEDURE — 2500000004 HC RX 250 GENERAL PHARMACY W/ HCPCS (ALT 636 FOR OP/ED): Performed by: NURSE PRACTITIONER

## 2024-11-05 PROCEDURE — 99214 OFFICE O/P EST MOD 30 MIN: CPT | Performed by: NURSE PRACTITIONER

## 2024-11-05 PROCEDURE — 85025 COMPLETE CBC W/AUTO DIFF WBC: CPT

## 2024-11-05 PROCEDURE — 84075 ASSAY ALKALINE PHOSPHATASE: CPT

## 2024-11-05 RX ORDER — HEPARIN 100 UNIT/ML
500 SYRINGE INTRAVENOUS AS NEEDED
Status: DISCONTINUED | OUTPATIENT
Start: 2024-11-05 | End: 2024-11-05 | Stop reason: HOSPADM

## 2024-11-05 RX ORDER — HEPARIN 100 UNIT/ML
500 SYRINGE INTRAVENOUS AS NEEDED
Status: CANCELLED | OUTPATIENT
Start: 2024-11-05

## 2024-11-05 RX ORDER — HEPARIN SODIUM,PORCINE/PF 10 UNIT/ML
50 SYRINGE (ML) INTRAVENOUS AS NEEDED
OUTPATIENT
Start: 2024-11-05

## 2024-11-05 RX ORDER — DOXYCYCLINE 100 MG/1
100 TABLET ORAL 2 TIMES DAILY
Qty: 14 TABLET | Refills: 0 | Status: SHIPPED | OUTPATIENT
Start: 2024-11-05 | End: 2024-11-12

## 2024-11-05 RX ORDER — HEPARIN 100 UNIT/ML
500 SYRINGE INTRAVENOUS AS NEEDED
OUTPATIENT
Start: 2024-11-05

## 2024-11-05 ASSESSMENT — ENCOUNTER SYMPTOMS
NERVOUS/ANXIOUS: 1
COUGH: 1
SHORTNESS OF BREATH: 1
HEADACHES: 1
FATIGUE: 1
FREQUENCY: 1
DEPRESSION: 1

## 2024-11-05 ASSESSMENT — PAIN SCALES - GENERAL: PAINLEVEL_OUTOF10: 3

## 2024-11-05 NOTE — PROGRESS NOTES
Patient ID:  Isaias Chamorro is a 44 y.o. female.  Referring Physician:   Ramya Webber MD PhD  [id number] [place] [address]  Primary Care Provider:  MORRO Jacobson-CNP    Assessment/Plan      11/5/24 HL with JANELLE recurrence s/p aSCT 4/15/14, pembro for post-transplant relapse (c/b pneumonitis). Recommend Mediport removal. Isaias will think about it.   Follow up in 6m unless new concerns arise. Mediport flush every 2m at [place],     Oncology History Overview Note   Hodgkin Lymphoma Diagnosis:  - stage IIB HL, dx 6/2013     ABVD:  - s/p ABVD x6 cycles with persistent disease noted in November 2013     BR:  - Enrolled on clinical trial [id number] with bendamustine and rituximab for 2 cycles  - PET-CT in March 2014 noted a CR     aSCT:  - s/p stem cell mobilization and collection:  collected 3.55 x10(6) CD34 cells per kg.   - s/p aSCT with BEAM preparative regimen  on April 15, 2014. Hospitalization complicated by neutropenic fever, pneumonia, menstrual bleeding, abdominal pain, nausea, vomiting, diarrhea, transaminitis secondary to medications, and electrolyte abnormalities.      Maintenance:  - s/p maintenance Brentuximab per clinical  research trial [id number], given on day 1 of each 21 day cycle.    Completed 16 cycles with CR by PET.     Relapse:  - She had imaging in July 2015 that suggested a possible recurrence. This was histologically confirmed by resection of a mediastinal mass by Dr. Oreilly.      Radiation:  - She began radiation therapy on 9/3/15 which completed on 10/6/15     Relapse:  - Admitted to [facility] (2/26/21) for weakness in left upper extremity.  MRI (2/23/21) demonstrated a tumor in the left paraspinous region and brachial plexus partially filling the C7-T1 neural foramen  (no cord compression), and enlarged left supraclavicular nodes. Ortho/Spine consulted on admit, no surgical intervention needed. PET scan (3/1/21) demonstrated left paraspinal mass, left  supraclavicular node, several cervical LN. Patient underwent a core  biopsy x 3 of the left supraclavicular node, confirmed Classical Hodgkin Lymphoma relapse. Patient was started on Prednisone 50mg daily      Pembro:  - Salvage pembrolizumab given 3/23, 4/13, 5/4, 5/25, 6/18, 7/9/2021  - EOT PET showed Deauville 1 response.  Discussed in tumor board on 8/19/21, reviewed with rad onc; appears that her relapse overlapped some of her prior radiation ports.  Recommended consideration of consolidative radiation.     Radiation:  - 19 fractions planned to recurrence site started in early Oct, 2021 (10/19/21-11/11/21)  - EOT PET 11/29/21 - deauville 1     Maintenance:  - Pembro q3w beginning 2/11/22.  Additional doses 3/4/22, 3/25/22, 5/6/22, 5/27/22, 6/17/22, 7/8/22.  - C/b interstitial pneumonitis (8/2022) requiring O2 supplementation, extended prednisone taper     Other PMH:  - History of protein S deficiency and splenic infarct in 2011 with history of anticoagulation prior to transplant. It has been determined that this is likely due to an acute illness and inflammation at the time of her protein S deficiency diagnosis and  she currently does not require anticoagulation.  - History of monoclonal IgG lambda gammopathy.  - Hypothyroidism.  - Hypertension.  - Depression and bipolar disorder.  - Restless leg syndrome.  - RUE celulitis d/t infiltrated IV during Brentuximab infusion (hospitalized 12/24 -12/30/14) & treated with Clindamycin  - COVID 4/2022  - Uvular swelling spring of 2022, s/p course of steroids from ENT without significant improvement; held pembro after 7/8/22        Hodgkins lymphoma (Multi)   9/17/2015 Initial Diagnosis    Hodgkins lymphoma (Multi)          Other PMH  History of protein S deficiency and splenic infarct in 2011 with history of anticoagulation prior to transplant. It has been determined that this is likely due to an acute illness and inflammation at the time of her protein S deficiency  diagnosis and  she currently does not require anticoagulation.  History of monoclonal IgG lambda gammopathy.  Hypothyroidism.  Hypertension.  Depression and bipolar disorder.  Restless leg syndrome.  ADAIR neritis d/t infiltrated IV during Brentuximab infusion (hospitalized 12/24 -12/30/14) & treated with Clindamycin  COVID 4/2022  Uvular swelling spring of 2022, s/p course of steroids from ENT without significant improvement; held pembro after 7/8/22  Fibromyalgia    Health maintenance  Follows with pulmonary, supp onc, gynecology, PCP, psychiatry/psychology..       Subjective    History of Present Illness:  Isaias presents to the clinic today for routine follow up evaluation.     She reports recent sinus infection with congestion, drainage, headache, pressure. Took Robitussin DM and Tylenol Sinus. Using inhaler for cough. Sleeping better. No fevers.     Energy level fair.     Abilify helping mood. Gained some weight.     Bought a house in [place].    Not working for about 2 years. On disability. 3D printing on the side..     Denies B symptoms: fever, worsening fatigue, LA, drenching night sweats, rash/pruritus.            Review of Systems   Constitutional:  Positive for fatigue (At baseline).   HENT:  Negative.     Eyes: Negative.    Respiratory:  Positive for cough and shortness of breath (Not daily.).    Cardiovascular: Negative.    Gastrointestinal: Negative.    Endocrine: Negative.    Genitourinary:  Positive for frequency.    Musculoskeletal: Negative.         Chronic pain in neck and shoulder.   Skin: Negative.    Neurological:  Positive for headaches (With sinus infection).   Hematological: Negative.    Psychiatric/Behavioral:  Positive for depression (Baseline). The patient is nervous/anxious.             Objective        Physical Exam  Vitals reviewed.   Constitutional:       Appearance: Normal appearance.      Comments: Well appearing.    HENT:      Head: Normocephalic and atraumatic.      Nose:  Nose normal.      Mouth/Throat:      Mouth: Mucous membranes are moist.   Eyes:      Pupils: Pupils are equal, round, and reactive to light.   Cardiovascular:      Rate and Rhythm: Normal rate and regular rhythm.      Pulses: Normal pulses.      Heart sounds: Normal heart sounds.   Pulmonary:      Effort: Pulmonary effort is normal.      Breath sounds: Normal breath sounds.   Abdominal:      General: Abdomen is flat. Bowel sounds are normal.      Palpations: Abdomen is soft.   Musculoskeletal:         General: Normal range of motion.   Lymphadenopathy:      Comments: No palpable cervical, supraclavicular, or axillary LA palpable.   Skin:     General: Skin is warm and dry.   Neurological:      General: No focal deficit present.      Mental Status: She is alert and oriented to person, place, and time.   Psychiatric:         Mood and Affect: Mood normal.

## 2024-11-08 ASSESSMENT — ENCOUNTER SYMPTOMS
HEMATOLOGIC/LYMPHATIC NEGATIVE: 1
MUSCULOSKELETAL NEGATIVE: 1
EYES NEGATIVE: 1
CARDIOVASCULAR NEGATIVE: 1
GASTROINTESTINAL NEGATIVE: 1
ENDOCRINE NEGATIVE: 1

## 2024-11-18 NOTE — PROGRESS NOTES
SUPPORTIVE AND PALLIATIVE ONCOLOGY OUTPATIENT FOLLOW-UP      SERVICE DATE: 11/20/2024    Subjective   HISTORY OF PRESENT ILLNESS: Isaias Chamorro is a 44 y.o. female who presents with past medical history of Hodgkin's Lymphoma, originally diagnosed June 2013, with recent relapse in March of 2021.  She received RT therapy to paraspinal mass, was pursuing Pembrolizumab but developed pneumonitis. Currently on surveillance.   Patient follows with Supportive Oncology for pain and further symptom management.       Pain Assessment:  Pain Score:  0  Location:    Description:      Symptom Assessment:  Pain:a little - patient reports increase in Methadone has greatly helped her pain. She split the AM dose to take 2.5mg AM, 2.5mg midday, and 5mg at bed to better tolerate it as 5mg at once seemed to make her fall asleep. With this she has been able to decrease Oxycodone to 5mg BID. Also continue Topiramate 100mg TID and Tizanidine as needed. Pain is well controlled  Numbness or Tingling in hands/feet/other: none  Sore Muscles/Spasms: none  Headache: a little - reports having headaches more recently attributing this to stress, changes in weather. She is planning to call and schedule acupuncture again with Dr. Beebe to help with this  Dizziness:none  Constipation: a little - taking Linzess daily, uses Senna as needed for further constipation  Diarrhea: none  Nausea: none  Vomiting: none  Lack of Appetite: none   Weight Loss: none  Taste changes: none  Dry Mouth: none  Pain in Mouth/Swallowing: none  Lack of Energy: none  Difficulty Sleeping: none  Worrying: none  Anxiety: a little  Depression: a little - notes she recently started back on abilify and with this has gained some weight which she is stressed about. She wants to start using her bike at home to increase activity to assist with concerns of weight gain and is hopeful to restart Zepbound through her bariatric doctor which will also help her to lose weight.  States she is starting back with a therapist near her new home which she is looking forward to  Shortness of breath: none  Other: none      Information obtained from: chart review and interview of patient  ______________________________________________________________________        Objective     Lab on 11/05/2024   Component Date Value Ref Range Status    LDH 11/05/2024 144  84 - 246 U/L Final    Glucose 11/05/2024 89  74 - 99 mg/dL Final    Sodium 11/05/2024 138  136 - 145 mmol/L Final    Potassium 11/05/2024 3.8  3.5 - 5.3 mmol/L Final    Chloride 11/05/2024 110 (H)  98 - 107 mmol/L Final    Bicarbonate 11/05/2024 22  21 - 32 mmol/L Final    Anion Gap 11/05/2024 10  10 - 20 mmol/L Final    Urea Nitrogen 11/05/2024 17  6 - 23 mg/dL Final    Creatinine 11/05/2024 0.60  0.50 - 1.05 mg/dL Final    eGFR 11/05/2024 >90  >60 mL/min/1.73m*2 Final    Calcium 11/05/2024 8.1 (L)  8.6 - 10.3 mg/dL Final    Albumin 11/05/2024 3.9  3.4 - 5.0 g/dL Final    Alkaline Phosphatase 11/05/2024 98  33 - 110 U/L Final    Total Protein 11/05/2024 6.6  6.4 - 8.2 g/dL Final    AST 11/05/2024 15  9 - 39 U/L Final    Bilirubin, Total 11/05/2024 0.4  0.0 - 1.2 mg/dL Final    ALT 11/05/2024 11  7 - 45 U/L Final    WBC 11/05/2024 4.7  4.4 - 11.3 x10*3/uL Final    nRBC 11/05/2024 0.0  0.0 - 0.0 /100 WBCs Final    RBC 11/05/2024 3.86 (L)  4.00 - 5.20 x10*6/uL Final    Hemoglobin 11/05/2024 11.2 (L)  12.0 - 16.0 g/dL Final    Hematocrit 11/05/2024 34.0 (L)  36.0 - 46.0 % Final    MCV 11/05/2024 88  80 - 100 fL Final    MCH 11/05/2024 29.0  26.0 - 34.0 pg Final    MCHC 11/05/2024 32.9  32.0 - 36.0 g/dL Final    RDW 11/05/2024 15.6 (H)  11.5 - 14.5 % Final    Platelets 11/05/2024 205  150 - 450 x10*3/uL Final    Neutrophils % 11/05/2024 60.6  40.0 - 80.0 % Final    Immature Granulocytes %, Automated 11/05/2024 0.2  0.0 - 0.9 % Final    Lymphocytes % 11/05/2024 26.9  13.0 - 44.0 % Final    Monocytes % 11/05/2024 8.9  2.0 - 10.0 % Final     "Eosinophils % 11/05/2024 2.1  0.0 - 6.0 % Final    Basophils % 11/05/2024 1.3  0.0 - 2.0 % Final    Neutrophils Absolute 11/05/2024 2.86  1.20 - 7.70 x10*3/uL Final    Immature Granulocytes Absolute, Au* 11/05/2024 0.01  0.00 - 0.70 x10*3/uL Final    Lymphocytes Absolute 11/05/2024 1.27  1.20 - 4.80 x10*3/uL Final    Monocytes Absolute 11/05/2024 0.42  0.10 - 1.00 x10*3/uL Final    Eosinophils Absolute 11/05/2024 0.10  0.00 - 0.70 x10*3/uL Final    Basophils Absolute 11/05/2024 0.06  0.00 - 0.10 x10*3/uL Final     PHYSICAL EXAMINATION   Vital Signs:   Vital signs reviewed      4/12/2024    11:10 AM 5/15/2024     1:09 PM 8/14/2024    12:59 PM 8/18/2024     9:27 AM 9/24/2024    12:18 PM 11/5/2024    11:33 AM 11/20/2024    12:54 PM   Vitals   Systolic 104 122 116 128 123 131 112   Diastolic 74 89 80 81 74 76 73   BP Location  Left arm Left arm   Left arm Left arm   Heart Rate  91 88 93 80 69 102   Temp  36 °C (96.8 °F) 36.2 °C (97.2 °F) 36.7 °C (98.1 °F) 36.2 °C (97.2 °F) 35.9 °C (96.6 °F) 36.6 °C (97.9 °F)   Resp  16 20 16 16 16 16   Height 1.486 m (4' 10.5\")    1.486 m (4' 10.5\")     Weight (lb) 215 209.2 201.28 200.62 201 211.86 211.9   BMI 44.17 kg/m2 42.98 kg/m2 41.35 kg/m2 41.22 kg/m2 41.29 kg/m2 43.52 kg/m2 43.53 kg/m2   BSA (m2) 2.01 m2 1.98 m2 1.94 m2 1.94 m2 1.94 m2 1.99 m2 1.99 m2   Visit Report Report Report Report   Report Report     Physical Exam  Vitals reviewed.   Constitutional:       Appearance: Normal appearance.   HENT:      Head: Normocephalic.   Cardiovascular:      Rate and Rhythm: Normal rate and regular rhythm.   Pulmonary:      Effort: Pulmonary effort is normal.   Abdominal:      General: Abdomen is flat.      Palpations: Abdomen is soft.   Musculoskeletal:         General: Normal range of motion.   Skin:     General: Skin is warm and dry.   Neurological:      General: No focal deficit present.      Mental Status: She is alert and oriented to person, place, and time. Mental status is at " baseline.   Psychiatric:         Mood and Affect: Mood normal.         Behavior: Behavior normal.         Thought Content: Thought content normal.         Judgment: Judgment normal.       ASSESSMENT/PLAN    Pain  Pain is: chronic - post cancer therapy  Type: somatic and neuropathic  Home regimen:   - Continue Methadone 5mg BID  - EKG (12/29/23) . EKG (11/20/2024) . Redo with each Methadone increase or yearly  - Decrease to Oxycodone 2.5-5mg [id number] PRN  - Continue Tizanidine 2mg 1-2x per day for muscle spasms  - Continue Topiramate 100mg TID  - Following with Dr Beebe - planning to schedule follow-up for acupuuncture for headaches - also may assist with pain and anxiety  - Pursuing massage therapy/stretching  - Continue Aquatic Therapy/PT  Intolerances/previously tried:   - Unable to take NSAIDs d/t bariatric surgery and Abreu's Esophagus  - Gabapentin - did not tolerate - excessive weight gain  - Pregabalin - did not tolerate  - Duloxetine - did not tolerate in past  - D/C Nortriptyline 10mg once daily at bed due to concerns of serotonin syndrome      Opioid Use  Medication Management:   - OARRS report reviewed with no aberrant behavior; consistent with  prescriptions/records and patient history  - MED 45.  Overdose Risk Score 200.   This has been discussed with patient.   - We will continue to closely monitor the patient for signs of prescription misuse including UDS, OARRS review and subjective reports at each visit.  - Concurrent benzodiazepine use with lorazepam, educated on risks.  - I am a provider who either is or has consulted and collaborated with a provider certified in Hospice and Palliative Medicine and have conducted a face-face visit and examination for this patient.  - Routine Urine Drug Screen: 5/15/2024 appropriately + for prescribed medications and - for illicits.  - Controlled Substance Agreement: 5/15/2024  - Specifically discussed that controlled substance prescriptions will  only be provided by our group as outlined in the completed agreement  - Prescribed naloxone: 5/6/22  - Red Flags: none     Constipation (Chronic in nature)  At risk for constipation related to opioids.  Home regimen:   - Following with Gastro Dr. Borrero  - Continue Linzess 145mcg daily  - Continue Senna-S PRN  - Continue Milk of Magnesia 24% Concentrate - 10mL once daily PRN for severe constipation  - Encouraged trying Bisacoydl suppository in addition for further relief     Nausea (improved)  - Continue Ondansetron 8mg [id number] PRN  - Continue Lorazepam 0.5mg, 1/2-1 tab q8 hours PRN     Altered Mood  Chronic anxiety and depression related to hx of bipolar disorder .  Home regimen:   - Managed by outside provider - appreciate recs  - Following with trauma counselor  - Pursuing acupuncture/massage  - Continue Lamictal 75mg daily  - Continue Sertraline 150mg daily  - Continue Lorazepam 0.5mg, 1/2 to 1 tablet [id number] PRN for anxiety  - Continue Abilify as prescribed by psychiatrist     Supportive and Palliative Oncology Encounter:  Emotional support provided  Coordination of care  Will continue to follow and address symptoms as needed    Next Follow-Up Visit:  Return to clinic in 3 months with Radha Quan CNP. Will align with oncology visits in the future    Signature and billing  Medical complexity was moderate level due to due to complexity of problems, extensive data review, and high risk of management/treatment.  Time was spent on the following: Prep Time, Time Directly with Patient/Family/Caregiver, Documentation Time. Total time spent: 35      Data  Diagnostic tests and information reviewed for today's visit:  Most recent labs and imaging results, Medications     Some elements copied from Palliative Care note on 8/14/2024, the elements have been updated and all reflect current decision making from today, 11/20/2024.    Plan of Care discussed with: Patient    SIGNATURE: MORRO Chaney-CNP    Contact  information:  Supportive and Palliative Oncology  Monday-Friday 8 AM-5 PM  Phone:  [phone], press option #5, then option #1.   Or Epic Secure Chat

## 2024-11-20 ENCOUNTER — OFFICE VISIT (OUTPATIENT)
Dept: PALLIATIVE MEDICINE | Facility: HOSPITAL | Age: 44
End: 2024-11-20
Payer: MEDICARE

## 2024-11-20 VITALS
SYSTOLIC BLOOD PRESSURE: 112 MMHG | BODY MASS INDEX: 43.53 KG/M2 | OXYGEN SATURATION: 97 % | RESPIRATION RATE: 16 BRPM | HEART RATE: 102 BPM | WEIGHT: 211.9 LBS | DIASTOLIC BLOOD PRESSURE: 73 MMHG | TEMPERATURE: 97.9 F

## 2024-11-20 DIAGNOSIS — M54.2 CHRONIC CERVICAL PAIN: ICD-10-CM

## 2024-11-20 DIAGNOSIS — G89.3 CANCER RELATED PAIN: ICD-10-CM

## 2024-11-20 DIAGNOSIS — K59.09 CHRONIC CONSTIPATION: ICD-10-CM

## 2024-11-20 DIAGNOSIS — G62.9 POLYNEUROPATHY: ICD-10-CM

## 2024-11-20 DIAGNOSIS — M19.90 ARTHRITIS: ICD-10-CM

## 2024-11-20 DIAGNOSIS — F41.9 ANXIETY: ICD-10-CM

## 2024-11-20 DIAGNOSIS — C81.90 HODGKIN LYMPHOMA, UNSPECIFIED HODGKIN LYMPHOMA TYPE, UNSPECIFIED BODY REGION (MULTI): ICD-10-CM

## 2024-11-20 DIAGNOSIS — F41.9 ANXIETY AND DEPRESSION: ICD-10-CM

## 2024-11-20 DIAGNOSIS — F32.A ANXIETY AND DEPRESSION: ICD-10-CM

## 2024-11-20 DIAGNOSIS — M79.7 FIBROMYALGIA: ICD-10-CM

## 2024-11-20 DIAGNOSIS — G89.29 CHRONIC CERVICAL PAIN: ICD-10-CM

## 2024-11-20 DIAGNOSIS — Z51.5 PALLIATIVE CARE ENCOUNTER: Primary | ICD-10-CM

## 2024-11-20 DIAGNOSIS — Z79.891 ENCOUNTER FOR MONITORING OPIOID MAINTENANCE THERAPY: ICD-10-CM

## 2024-11-20 DIAGNOSIS — Z51.5 ENCOUNTER FOR PALLIATIVE CARE: ICD-10-CM

## 2024-11-20 DIAGNOSIS — G89.3 CHRONIC PAIN DUE TO NEOPLASM: ICD-10-CM

## 2024-11-20 DIAGNOSIS — Z51.81 ENCOUNTER FOR MONITORING OPIOID MAINTENANCE THERAPY: ICD-10-CM

## 2024-11-20 PROCEDURE — 93005 ELECTROCARDIOGRAM TRACING: CPT | Performed by: NURSE PRACTITIONER

## 2024-11-20 PROCEDURE — 99214 OFFICE O/P EST MOD 30 MIN: CPT | Performed by: NURSE PRACTITIONER

## 2024-11-20 PROCEDURE — 93010 ELECTROCARDIOGRAM REPORT: CPT | Performed by: NURSE PRACTITIONER

## 2024-11-20 RX ORDER — OXYCODONE HYDROCHLORIDE 5 MG/1
5 TABLET ORAL EVERY 12 HOURS PRN
Qty: 60 TABLET | Refills: 0 | Status: SHIPPED | OUTPATIENT
Start: 2024-11-25 | End: 2024-12-25

## 2024-11-20 RX ORDER — METHADONE HYDROCHLORIDE 5 MG/1
TABLET ORAL
Qty: 60 TABLET | Refills: 0 | Status: SHIPPED | OUTPATIENT
Start: 2024-11-25 | End: 2024-12-25

## 2024-11-20 ASSESSMENT — PAIN SCALES - GENERAL: PAINLEVEL_OUTOF10: 0-NO PAIN

## 2024-12-01 DIAGNOSIS — G47.33 OSA (OBSTRUCTIVE SLEEP APNEA): ICD-10-CM

## 2024-12-01 RX ORDER — SEMAGLUTIDE 1 MG/.5ML
1 INJECTION, SOLUTION SUBCUTANEOUS
Qty: 2 ML | Refills: 0 | Status: SHIPPED | OUTPATIENT
Start: 2024-12-01

## 2024-12-18 ENCOUNTER — DOCUMENTATION (OUTPATIENT)
Dept: PRIMARY CARE | Facility: CLINIC | Age: 44
End: 2024-12-18
Payer: COMMERCIAL

## 2024-12-23 ENCOUNTER — TELEPHONE (OUTPATIENT)
Dept: ADMISSION | Facility: HOSPITAL | Age: 44
End: 2024-12-23
Payer: COMMERCIAL

## 2024-12-23 DIAGNOSIS — G89.3 CHRONIC PAIN DUE TO NEOPLASM: ICD-10-CM

## 2024-12-23 DIAGNOSIS — R11.2 NAUSEA AND VOMITING, UNSPECIFIED VOMITING TYPE: ICD-10-CM

## 2024-12-23 DIAGNOSIS — G89.3 CANCER RELATED PAIN: ICD-10-CM

## 2024-12-23 RX ORDER — ONDANSETRON HYDROCHLORIDE 8 MG/1
8 TABLET, FILM COATED ORAL 3 TIMES DAILY PRN
Qty: 90 TABLET | Refills: 0 | Status: SHIPPED | OUTPATIENT
Start: 2024-12-23 | End: 2025-01-22

## 2024-12-23 RX ORDER — METHADONE HYDROCHLORIDE 5 MG/1
TABLET ORAL
Qty: 60 TABLET | Refills: 0 | Status: SHIPPED | OUTPATIENT
Start: 2024-12-24 | End: 2025-01-23

## 2024-12-23 RX ORDER — OXYCODONE HYDROCHLORIDE 5 MG/1
5 TABLET ORAL EVERY 12 HOURS PRN
Qty: 60 TABLET | Refills: 0 | Status: SHIPPED | OUTPATIENT
Start: 2024-12-24 | End: 2025-01-23

## 2024-12-23 NOTE — TELEPHONE ENCOUNTER
Patient last seen by MORRO Cantu on 11/20 with plan to continue methadone 5mg BID and oxycodone 2.5-5mg q12h PRN. Patient also to continue zofran 8mg q8h PRN. OARRS reviewed and no aberrancy noted. On 11/25, patient last filled methadone 5mg #60/30 days and oxycodone 5mg #60/30 days. Prescriptions pended to provider to approve.

## 2024-12-23 NOTE — TELEPHONE ENCOUNTER
Pt requesting refills  Methadone 5mg. 2.5mg in a.m.; 2.5mg midday and 5 mg nightly  Oxycodone 5mg. 1 tablet every 12 hrs prn  Zofran 8mg. 1 tablet TID PRN  Pharmacy: [facility] [address] In [place]  Last FUV 11/20, next FUV 2/14

## 2025-01-17 ENCOUNTER — APPOINTMENT (OUTPATIENT)
Dept: HEMATOLOGY/ONCOLOGY | Facility: CLINIC | Age: 45
End: 2025-01-17
Payer: COMMERCIAL

## 2025-01-28 ENCOUNTER — TELEPHONE (OUTPATIENT)
Dept: ADMISSION | Facility: HOSPITAL | Age: 45
End: 2025-01-28
Payer: COMMERCIAL

## 2025-01-28 DIAGNOSIS — G89.3 CHRONIC PAIN DUE TO NEOPLASM: ICD-10-CM

## 2025-01-28 DIAGNOSIS — F41.9 ANXIETY: ICD-10-CM

## 2025-01-28 DIAGNOSIS — G89.3 CANCER RELATED PAIN: ICD-10-CM

## 2025-01-28 DIAGNOSIS — Z51.5 ENCOUNTER FOR PALLIATIVE CARE: ICD-10-CM

## 2025-01-28 RX ORDER — OXYCODONE HYDROCHLORIDE 5 MG/1
5 TABLET ORAL EVERY 12 HOURS PRN
Qty: 60 TABLET | Refills: 0 | Status: SHIPPED | OUTPATIENT
Start: 2025-01-28 | End: 2025-02-27

## 2025-01-28 RX ORDER — METHADONE HYDROCHLORIDE 5 MG/1
TABLET ORAL
Qty: 60 TABLET | Refills: 0 | Status: SHIPPED | OUTPATIENT
Start: 2025-01-28 | End: 2025-02-27

## 2025-01-28 RX ORDER — LORAZEPAM 0.5 MG/1
0.5 TABLET ORAL 3 TIMES DAILY PRN
Qty: 90 TABLET | Refills: 2 | Status: SHIPPED | OUTPATIENT
Start: 2025-01-28 | End: 2025-04-28

## 2025-01-28 NOTE — TELEPHONE ENCOUNTER
Pt requesting refills  Oxycodone 5mg. 1 tablet every 12 hrs prn  Methadone 5mg. 2.5mg in the a.m., 2.5mg midday, and 5mg at HS.   Lorazepam 0.5mg 1 tablet TID prn  Pharmacy: [facility] [address]  In [place]  Last FYV 11/20, next FUV 2/14.

## 2025-01-31 ENCOUNTER — APPOINTMENT (OUTPATIENT)
Dept: PRIMARY CARE | Facility: CLINIC | Age: 45
End: 2025-01-31
Payer: COMMERCIAL

## 2025-01-31 VITALS
WEIGHT: 219 LBS | BODY MASS INDEX: 44.99 KG/M2 | SYSTOLIC BLOOD PRESSURE: 125 MMHG | RESPIRATION RATE: 16 BRPM | DIASTOLIC BLOOD PRESSURE: 84 MMHG | TEMPERATURE: 97.6 F | HEART RATE: 73 BPM

## 2025-01-31 DIAGNOSIS — F31.32 BIPOLAR AFFECTIVE DISORDER, CURRENTLY DEPRESSED, MODERATE (MULTI): ICD-10-CM

## 2025-01-31 DIAGNOSIS — R73.01 ABNORMAL FASTING GLUCOSE: ICD-10-CM

## 2025-01-31 DIAGNOSIS — Z13.89 GOUT SCREEN: ICD-10-CM

## 2025-01-31 DIAGNOSIS — G47.33 OSA (OBSTRUCTIVE SLEEP APNEA): ICD-10-CM

## 2025-01-31 DIAGNOSIS — Z98.84 STATUS POST BARIATRIC SURGERY: ICD-10-CM

## 2025-01-31 DIAGNOSIS — J45.20 MILD INTERMITTENT ASTHMA WITHOUT COMPLICATION (HHS-HCC): ICD-10-CM

## 2025-01-31 DIAGNOSIS — E03.9 ACQUIRED HYPOTHYROIDISM: ICD-10-CM

## 2025-01-31 DIAGNOSIS — C81.90 HODGKIN LYMPHOMA, UNSPECIFIED HODGKIN LYMPHOMA TYPE, UNSPECIFIED BODY REGION (MULTI): ICD-10-CM

## 2025-01-31 DIAGNOSIS — E28.2 PCOS (POLYCYSTIC OVARIAN SYNDROME): ICD-10-CM

## 2025-01-31 DIAGNOSIS — G89.29 OTHER CHRONIC PAIN: ICD-10-CM

## 2025-01-31 DIAGNOSIS — Z00.01 ENCOUNTER FOR GENERAL ADULT MEDICAL EXAMINATION WITH ABNORMAL FINDINGS: Primary | ICD-10-CM

## 2025-01-31 DIAGNOSIS — Z23 NEED FOR PNEUMOCOCCAL VACCINATION: ICD-10-CM

## 2025-01-31 DIAGNOSIS — Z12.31 ENCOUNTER FOR SCREENING MAMMOGRAM FOR MALIGNANT NEOPLASM OF BREAST: ICD-10-CM

## 2025-01-31 DIAGNOSIS — L65.9 HAIR LOSS: ICD-10-CM

## 2025-01-31 PROCEDURE — G2211 COMPLEX E/M VISIT ADD ON: HCPCS | Performed by: FAMILY MEDICINE

## 2025-01-31 PROCEDURE — 99215 OFFICE O/P EST HI 40 MIN: CPT | Performed by: FAMILY MEDICINE

## 2025-01-31 RX ORDER — CARIPRAZINE 1.5 MG/1
CAPSULE, GELATIN COATED ORAL
COMMUNITY
Start: 2025-01-09

## 2025-01-31 RX ORDER — BENZOCAINE .13; .15; .5; 2 G/100G; G/100G; G/100G; G/100G
2 GEL ORAL DAILY
Qty: 8.6 G | Refills: 11 | Status: SHIPPED | OUTPATIENT
Start: 2025-01-31

## 2025-01-31 RX ORDER — CALCIUM CIT/MAG/D3/ZN/COP/MANG 250-40-125
1 TABLET ORAL DAILY
Qty: 90 TABLET | Refills: 3 | Status: SHIPPED | OUTPATIENT
Start: 2025-01-31 | End: 2026-01-31

## 2025-01-31 RX ORDER — ALBUTEROL SULFATE 90 UG/1
2 INHALANT RESPIRATORY (INHALATION) EVERY 4 HOURS PRN
Qty: 18 G | Refills: 11 | Status: SHIPPED | OUTPATIENT
Start: 2025-01-31 | End: 2026-01-31

## 2025-01-31 RX ORDER — LANOLIN ALCOHOL/MO/W.PET/CERES
1000 CREAM (GRAM) TOPICAL DAILY
Qty: 90 TABLET | Refills: 3 | Status: SHIPPED | OUTPATIENT
Start: 2025-01-31 | End: 2026-01-31

## 2025-01-31 RX ORDER — LEVOTHYROXINE SODIUM 200 UG/1
200 TABLET ORAL
Qty: 90 TABLET | Refills: 3 | Status: SHIPPED | OUTPATIENT
Start: 2025-01-31

## 2025-01-31 RX ORDER — MONTELUKAST SODIUM 10 MG/1
10 TABLET ORAL NIGHTLY
Qty: 90 TABLET | Refills: 3 | Status: SHIPPED | OUTPATIENT
Start: 2025-01-31 | End: 2026-01-31

## 2025-01-31 RX ORDER — FERROUS SULFATE 325(65) MG
325 TABLET, DELAYED RELEASE (ENTERIC COATED) ORAL
Qty: 90 TABLET | Refills: 3 | Status: SHIPPED | OUTPATIENT
Start: 2025-01-31 | End: 2026-01-31

## 2025-01-31 RX ORDER — ACETAMINOPHEN 500 MG
5000 TABLET ORAL DAILY
Qty: 90 TABLET | Refills: 3 | Status: SHIPPED | OUTPATIENT
Start: 2025-01-31 | End: 2025-02-07 | Stop reason: SDUPTHER

## 2025-01-31 RX ORDER — ACETAMINOPHEN AND PHENYLEPHRINE HCL 325; 5 MG/1; MG/1
10 TABLET ORAL DAILY
Qty: 90 CAPSULE | Refills: 3 | Status: SHIPPED | OUTPATIENT
Start: 2025-01-31 | End: 2026-01-31

## 2025-01-31 NOTE — PATIENT INSTRUCTIONS
USE MYCHART.  CALL FOR NEEDS [phone].   KEEP ON MEDICATIONS  KEEP SPECIALTY APPOINTMENTS.    REFER TO DERM FOR HAIR LOSS CARE. CALL DR UGARTE [phone]   REFER ENDOCRINOLOGY IF LABS FAIL TO DIRECT US.    GI CARE S/P [facility] 2020 DR MATT HYMAN NOW FOR CONTINUED CARE CALL [phone]   ONCOLOGY DR CYNDEE HOUGH   REFER ENDO: CALL DR PAUL SIERRA [phone]: PCOS, S/P THYMECTOMY, OBESITY, LYMPHOMA.   CLINICAL PHARM: [facility]ES[facility] NEEDS TOSHIA, ZEPBOUND ETC.    PALLIATIVE CARE KNOWN TO DR HILL

## 2025-01-31 NOTE — PROGRESS NOTES
Subjective   Patient ID: Isaias Chamorro is a 44 y.o. female who presents for New Patient Visit (New to est /Hair loss and weight loss meds).    HPI   New Patient Visit (New to est /Hair loss and weight loss meds).    THYROID DISORDER ADN PT DISLIKES ON Q 12 M TSH FLUP.  HAVING SXS X 1 YR.      S/P HODGKIN LYMPHOMA AT NECK AND WORRIED FOR HER THYROID.      SEEN WITH STUDENT DOCTOR LORE. 1/31/2025      Review of Systems   All other systems reviewed and are negative.      Objective   /84 (BP Location: Left arm, Patient Position: Sitting, BP Cuff Size: Large adult)   Pulse 73   Temp 36.4 °C (97.6 °F) (Temporal)   Resp 16   Wt 99.3 kg (219 lb)   BMI 44.99 kg/m²     Physical Exam  Vitals and nursing note reviewed.   Constitutional:       Appearance: Normal appearance.   HENT:      Head: Normocephalic.      Right Ear: Tympanic membrane, ear canal and external ear normal.      Left Ear: Tympanic membrane, ear canal and external ear normal.      Nose: Nose normal.      Mouth/Throat:      Mouth: Mucous membranes are moist.      Pharynx: Oropharynx is clear.   Eyes:      Conjunctiva/sclera: Conjunctivae normal.      Pupils: Pupils are equal, round, and reactive to light.   Cardiovascular:      Rate and Rhythm: Normal rate and regular rhythm.      Pulses: Normal pulses.      Heart sounds: Normal heart sounds.   Pulmonary:      Effort: Pulmonary effort is normal.      Breath sounds: Normal breath sounds.   Abdominal:      General: Bowel sounds are normal.      Palpations: Abdomen is soft.   Musculoskeletal:         General: Normal range of motion.      Cervical back: Normal range of motion and neck supple.   Skin:     General: Skin is warm and dry.   Neurological:      General: No focal deficit present.      Mental Status: Mental status is at baseline.   Psychiatric:         Mood and Affect: Mood normal.         Behavior: Behavior normal.         Thought Content: Thought content normal.         Judgment:  Judgment normal.         Assessment/Plan   Assessment & Plan  Acquired hypothyroidism    Orders:    TSH with reflex to Free T4 if abnormal; Future    levothyroxine (Synthroid, Levoxyl) 200 mcg tablet; Take 1 tablet (200 mcg) by mouth once daily in the morning. Take before meals.    Referral to Endocrinology; Future    Bipolar affective disorder, currently depressed, moderate (Multi)    Orders:    Referral to Clinical Pharmacy; Future    LELIA (obstructive sleep apnea)         BMI 45.0-49.9, adult (Multi)    Orders:    Referral to Clinical Pharmacy; Future    Topiramate level; Future    tirzepatide, weight loss, (Zepbound) 2.5 mg/0.5 mL injection; Inject 2.5 mg under the skin every 7 days.    Status post bariatric surgery    Orders:    ascorbic acid (Vitamin C) 100 mg tablet; Take 1 tablet (100 mg) by mouth once daily.    biotin 10,000 mcg capsule; Take 1 capsule (10 mg) by mouth once daily.    calcium cit-mag-D3-Zn--maxine (Calcium Citrate Plus) 250 mg-40 mg- 125 unit-3.75mg tablet; Take 1 tablet by mouth once daily.    cholecalciferol (Vitamin D3) 5,000 Units tablet; Take 1 tablet (5,000 Units) by mouth once daily.    Referral to Gastroenterology; Future    Referral to Clinical Pharmacy; Future    Esophagogastroduodenoscopy (EGD); Future    tirzepatide, weight loss, (Zepbound) 2.5 mg/0.5 mL injection; Inject 2.5 mg under the skin every 7 days.    Hodgkin lymphoma, unspecified Hodgkin lymphoma type, unspecified body region (Multi)    Orders:    Vitamin D 25-Hydroxy,Total (for eval of Vitamin D levels); Future    CBC and Auto Differential; Future    ferrous sulfate 325 (65 Fe) MG EC tablet; Take 1 tablet by mouth once daily with breakfast.    Lamotrigine level; Future    Referral To Hematology and Oncology; Future    PCOS (polycystic ovarian syndrome)    Orders:    Vitamin D 25-Hydroxy,Total (for eval of Vitamin D levels); Future    CBC and Auto Differential; Future    ascorbic acid (Vitamin C) 100 mg tablet; Take 1  tablet (100 mg) by mouth once daily.    Referral to Endocrinology; Future    Abnormal fasting glucose    Orders:    Referral to Endocrinology; Future    Hair loss    Orders:    Referral to Dermatology    ascorbic acid (Vitamin C) 100 mg tablet; Take 1 tablet (100 mg) by mouth once daily.    Referral to Endocrinology; Future    Encounter for general adult medical examination with abnormal findings    Orders:    Referral to Dermatology    Vitamin D 25-Hydroxy,Total (for eval of Vitamin D levels); Future    Urinalysis with Reflex Microscopic; Future    TSH with reflex to Free T4 if abnormal; Future    Comprehensive Metabolic Panel; Future    CBC and Auto Differential; Future    Lipid panel; Future    Hepatitis C antibody; Future    Esophagogastroduodenoscopy (EGD); Future    tirzepatide, weight loss, (Zepbound) 2.5 mg/0.5 mL injection; Inject 2.5 mg under the skin every 7 days.    Referral to Endocrinology; Future    Gout screen    Orders:    Uric acid; Future    Other chronic pain    Orders:    Referral to Palliative Care; Future    Topiramate level; Future    Mild intermittent asthma without complication ([facility])    Orders:    albuterol 90 mcg/actuation inhaler; Inhale 2 puffs every 4 hours if needed for wheezing.    budesonide (Rhinocort AQ) 32 mcg/actuation nasal spray; Administer 2 sprays into each nostril once daily.    Vitamin B12; Future    cyanocobalamin (Vitamin B-12) 1,000 mcg tablet; Take 1 tablet (1,000 mcg) by mouth once daily.    montelukast (Singulair) 10 mg tablet; Take 1 tablet (10 mg) by mouth once daily at bedtime.    Encounter for screening mammogram for malignant neoplasm of breast    Orders:    BI mammo bilateral screening tomosynthesis; Future    Need for pneumococcal vaccination

## 2025-02-03 ENCOUNTER — TELEPHONE (OUTPATIENT)
Dept: HEMATOLOGY/ONCOLOGY | Facility: HOSPITAL | Age: 45
End: 2025-02-03
Payer: COMMERCIAL

## 2025-02-03 NOTE — TELEPHONE ENCOUNTER
Team received referral from patient's PCP to see Dr. Webber. Patient is established with Dr. Webber and has FUV scheduled on May. RN called the patient to make sure she did not need seen sooner. She said she has no current issues that need addressed sooner than her FUV for Dr. Webber. She said her PCP did not realize she already had a FUV. She said she will call the office if she needs to be seen sooner

## 2025-02-04 NOTE — ASSESSMENT & PLAN NOTE
Orders:    Vitamin D 25-Hydroxy,Total (for eval of Vitamin D levels); Future    CBC and Auto Differential; Future    ascorbic acid (Vitamin C) 100 mg tablet; Take 1 tablet (100 mg) by mouth once daily.    Referral to Endocrinology; Future

## 2025-02-04 NOTE — ASSESSMENT & PLAN NOTE
Orders:    albuterol 90 mcg/actuation inhaler; Inhale 2 puffs every 4 hours if needed for wheezing.    budesonide (Rhinocort AQ) 32 mcg/actuation nasal spray; Administer 2 sprays into each nostril once daily.    Vitamin B12; Future    cyanocobalamin (Vitamin B-12) 1,000 mcg tablet; Take 1 tablet (1,000 mcg) by mouth once daily.    montelukast (Singulair) 10 mg tablet; Take 1 tablet (10 mg) by mouth once daily at bedtime.

## 2025-02-04 NOTE — ASSESSMENT & PLAN NOTE
Orders:    TSH with reflex to Free T4 if abnormal; Future    levothyroxine (Synthroid, Levoxyl) 200 mcg tablet; Take 1 tablet (200 mcg) by mouth once daily in the morning. Take before meals.    Referral to Endocrinology; Future

## 2025-02-04 NOTE — ASSESSMENT & PLAN NOTE
Orders:    Vitamin D 25-Hydroxy,Total (for eval of Vitamin D levels); Future    CBC and Auto Differential; Future    ferrous sulfate 325 (65 Fe) MG EC tablet; Take 1 tablet by mouth once daily with breakfast.    Lamotrigine level; Future    Referral To Hematology and Oncology; Future

## 2025-02-07 DIAGNOSIS — Z98.84 STATUS POST BARIATRIC SURGERY: Primary | ICD-10-CM

## 2025-02-07 DIAGNOSIS — C81.90 HODGKIN LYMPHOMA, UNSPECIFIED HODGKIN LYMPHOMA TYPE, UNSPECIFIED BODY REGION (MULTI): ICD-10-CM

## 2025-02-07 RX ORDER — ACETAMINOPHEN 500 MG
5000 TABLET ORAL DAILY
Qty: 90 TABLET | Refills: 3 | Status: SHIPPED | OUTPATIENT
Start: 2025-02-07 | End: 2026-02-07

## 2025-02-07 RX ORDER — BISMUTH SUBSALICYLATE 262 MG
1 TABLET,CHEWABLE ORAL DAILY
Qty: 30 TABLET | Refills: 11 | Status: SHIPPED | OUTPATIENT
Start: 2025-02-07 | End: 2026-02-07

## 2025-02-09 LAB
25(OH)D3+25(OH)D2 SERPL-MCNC: 26 NG/ML (ref 30–100)
ALBUMIN SERPL-MCNC: 4 G/DL (ref 3.6–5.1)
ALP SERPL-CCNC: 75 U/L (ref 31–125)
ALT SERPL-CCNC: 14 U/L (ref 6–29)
ANION GAP SERPL CALCULATED.4IONS-SCNC: 8 MMOL/L (CALC) (ref 7–17)
AST SERPL-CCNC: 17 U/L (ref 10–30)
BASOPHILS # BLD AUTO: 51 CELLS/UL (ref 0–200)
BASOPHILS NFR BLD AUTO: 1.3 %
BILIRUB SERPL-MCNC: 0.4 MG/DL (ref 0.2–1.2)
BUN SERPL-MCNC: 15 MG/DL (ref 7–25)
CALCIUM SERPL-MCNC: 8.5 MG/DL (ref 8.6–10.2)
CHLORIDE SERPL-SCNC: 107 MMOL/L (ref 98–110)
CHOLEST SERPL-MCNC: 159 MG/DL
CHOLEST/HDLC SERPL: 3.2 (CALC)
CO2 SERPL-SCNC: 25 MMOL/L (ref 20–32)
CREAT SERPL-MCNC: 0.65 MG/DL (ref 0.5–0.99)
EGFRCR SERPLBLD CKD-EPI 2021: 113 ML/MIN/1.73M2
EOSINOPHIL # BLD AUTO: 215 CELLS/UL (ref 15–500)
EOSINOPHIL NFR BLD AUTO: 5.5 %
ERYTHROCYTE [DISTWIDTH] IN BLOOD BY AUTOMATED COUNT: 15.4 % (ref 11–15)
GLUCOSE SERPL-MCNC: 93 MG/DL (ref 65–99)
HCT VFR BLD AUTO: 36.8 % (ref 35–45)
HCV AB SERPL QL IA: NORMAL
HDLC SERPL-MCNC: 50 MG/DL
HGB BLD-MCNC: 12 G/DL (ref 11.7–15.5)
LAMOTRIGINE SERPL-MCNC: 1 MCG/ML (ref 2.5–15)
LDLC SERPL CALC-MCNC: 89 MG/DL (CALC)
LYMPHOCYTES # BLD AUTO: 1225 CELLS/UL (ref 850–3900)
LYMPHOCYTES NFR BLD AUTO: 31.4 %
MCH RBC QN AUTO: 28.1 PG (ref 27–33)
MCHC RBC AUTO-ENTMCNC: 32.6 G/DL (ref 32–36)
MCV RBC AUTO: 86.2 FL (ref 80–100)
MONOCYTES # BLD AUTO: 374 CELLS/UL (ref 200–950)
MONOCYTES NFR BLD AUTO: 9.6 %
NEUTROPHILS # BLD AUTO: 2036 CELLS/UL (ref 1500–7800)
NEUTROPHILS NFR BLD AUTO: 52.2 %
NONHDLC SERPL-MCNC: 109 MG/DL (CALC)
PLATELET # BLD AUTO: 186 THOUSAND/UL (ref 140–400)
PMV BLD REES-ECKER: 9 FL (ref 7.5–12.5)
POTASSIUM SERPL-SCNC: 4 MMOL/L (ref 3.5–5.3)
PROT SERPL-MCNC: 6.6 G/DL (ref 6.1–8.1)
RBC # BLD AUTO: 4.27 MILLION/UL (ref 3.8–5.1)
SODIUM SERPL-SCNC: 140 MMOL/L (ref 135–146)
TOPIRAMATE SERPL-MCNC: NORMAL UG/ML
TRIGL SERPL-MCNC: 106 MG/DL
TSH SERPL-ACNC: 4.07 MIU/L
URATE SERPL-MCNC: 4.8 MG/DL (ref 2.5–7)
VIT B12 SERPL-MCNC: 392 PG/ML (ref 200–1100)
WBC # BLD AUTO: 3.9 THOUSAND/UL (ref 3.8–10.8)

## 2025-02-10 ENCOUNTER — APPOINTMENT (OUTPATIENT)
Dept: OBSTETRICS AND GYNECOLOGY | Facility: CLINIC | Age: 45
End: 2025-02-10
Payer: COMMERCIAL

## 2025-02-10 VITALS
DIASTOLIC BLOOD PRESSURE: 80 MMHG | WEIGHT: 224 LBS | SYSTOLIC BLOOD PRESSURE: 124 MMHG | HEIGHT: 60 IN | BODY MASS INDEX: 43.98 KG/M2

## 2025-02-10 DIAGNOSIS — Z12.4 CERVICAL CANCER SCREENING: ICD-10-CM

## 2025-02-10 DIAGNOSIS — Z12.39 ENCOUNTER FOR BREAST CANCER SCREENING USING NON-MAMMOGRAM MODALITY: ICD-10-CM

## 2025-02-10 DIAGNOSIS — R87.810 ASCUS WITH POSITIVE HIGH RISK HPV CERVICAL: ICD-10-CM

## 2025-02-10 DIAGNOSIS — R53.83 OTHER FATIGUE: ICD-10-CM

## 2025-02-10 DIAGNOSIS — N95.1 MENOPAUSAL SYMPTOMS: ICD-10-CM

## 2025-02-10 DIAGNOSIS — R87.610 ASCUS WITH POSITIVE HIGH RISK HPV CERVICAL: ICD-10-CM

## 2025-02-10 DIAGNOSIS — Z01.419 VISIT FOR PELVIC EXAM: Primary | ICD-10-CM

## 2025-02-10 PROCEDURE — 87624 HPV HI-RISK TYP POOLED RSLT: CPT

## 2025-02-10 PROCEDURE — 88175 CYTOPATH C/V AUTO FLUID REDO: CPT

## 2025-02-10 PROCEDURE — 99396 PREV VISIT EST AGE 40-64: CPT | Performed by: OBSTETRICS & GYNECOLOGY

## 2025-02-10 PROCEDURE — 1036F TOBACCO NON-USER: CPT | Performed by: OBSTETRICS & GYNECOLOGY

## 2025-02-10 PROCEDURE — 3008F BODY MASS INDEX DOCD: CPT | Performed by: OBSTETRICS & GYNECOLOGY

## 2025-02-10 RX ORDER — ESTRADIOL 0.04 MG/D
1 FILM, EXTENDED RELEASE TRANSDERMAL 2 TIMES WEEKLY
Qty: 8 PATCH | Refills: 11 | Status: SHIPPED | OUTPATIENT
Start: 2025-02-10 | End: 2026-02-10

## 2025-02-10 ASSESSMENT — ENCOUNTER SYMPTOMS
NEUROLOGICAL NEGATIVE: 1
RESPIRATORY NEGATIVE: 1
PSYCHIATRIC NEGATIVE: 1
MUSCULOSKELETAL NEGATIVE: 1
GASTROINTESTINAL NEGATIVE: 1
CONSTITUTIONAL NEGATIVE: 1
CARDIOVASCULAR NEGATIVE: 1

## 2025-02-10 NOTE — PATIENT INSTRUCTIONS
Routine Gynecologic Visit:  You were seen today for a routine gyn visit with normal findings on exam  You were due for a pap smear today. You should still continue to get annual breast and pelvic exams in the office.  An order was placed in the system for mammogram. Please get done at your earliest convenience  If you are having any gynecologic issues in the next year you should call the office. [phone] ([place]) or [phone] ([place])

## 2025-02-10 NOTE — PROGRESS NOTES
Subjective   Isaias Chamorro is a 44 y.o. female who is here for a routine exam. Patient amenorrheic on IUD. Cyclic symptoms include none. No intermenstrual bleeding, spotting, or discharge.     Current contraception: IUD  History of abnormal Pap smear: yes - ASCUS/HPV pos  Family history of uterine or ovarian cancer: no  Regular self breast exam: yes  History of abnormal mammogram: no  Family history of breast cancer: no  History of abnormal lipids: yes - well managed  Menstrual History:  OB History          0    Para   0    Term   0       0    AB   0    Living   0         SAB   0    IAB   0    Ectopic   0    Multiple   0    Live Births   0                  No LMP recorded. (Menstrual status: IUD).         Review of Systems   Constitutional: Negative.    Respiratory: Negative.     Cardiovascular: Negative.    Gastrointestinal: Negative.    Genitourinary: Negative.    Musculoskeletal: Negative.    Neurological: Negative.    Psychiatric/Behavioral: Negative.         Objective   /80   Ht 1.524 m (5')   Wt 102 kg (224 lb)   BMI 43.75 kg/m²     General:   alert, appears stated age, and cooperative   Heart: regular rate and rhythm, S1, S2 normal, no murmur, click, rub or gallop   Lungs: clear to auscultation bilaterally   Abdomen: soft, non-tender, without masses or organomegaly   Pelvic: Vulva normal in appearance without discoloration, rashes, lesions. Vagina is negative for blood, discharge, lesions. Uterus is small, mobile, non tender. There is no cervical motion tenderness, adnexal masses/tenderness. IUD strings are visualized.    Breast: No masses, skin changes, discoloration, tenderness, or nipple drainage bilaterally    Neck: Normal ROM. Thyroid normal size. No masses/tenderness     Lymph: No LAD   Psych: Normal mood/affect     Assessment/Plan   Problem List Items Addressed This Visit       ASCUS with positive high risk HPV cervical    Relevant Orders    THINPREP PAP     Other  Visit Diagnoses       Visit for pelvic exam    -  Primary    Encounter for breast cancer screening using non-mammogram modality        Cervical cancer screening        Relevant Orders    THINPREP PAP    Other fatigue        Relevant Orders    Parathyroid Hormone, Intact    Testosterone,Free and Total    Menopausal symptoms        Discussed clinical course   Discussed common causes of symptoms and recent TSH, E2, FSH labs  Tesosterone ordered.   Discussed HRT, patient considering    Relevant Medications    estradiol (Vivelle-DOT) 0.0375 mg/24 hr           1. Pelvic/breast exam wnl  2. Rec for mammogram given, counseled in breast awareness/self exam  3. Pap/cotest pending  4. IUD in place without complication    RTO 1 year  Ronda Fine, DO

## 2025-02-11 LAB
25(OH)D3+25(OH)D2 SERPL-MCNC: 26 NG/ML (ref 30–100)
ALBUMIN SERPL-MCNC: 4 G/DL (ref 3.6–5.1)
ALP SERPL-CCNC: 75 U/L (ref 31–125)
ALT SERPL-CCNC: 14 U/L (ref 6–29)
ANION GAP SERPL CALCULATED.4IONS-SCNC: 8 MMOL/L (CALC) (ref 7–17)
AST SERPL-CCNC: 17 U/L (ref 10–30)
BASOPHILS # BLD AUTO: 51 CELLS/UL (ref 0–200)
BASOPHILS NFR BLD AUTO: 1.3 %
BILIRUB SERPL-MCNC: 0.4 MG/DL (ref 0.2–1.2)
BUN SERPL-MCNC: 15 MG/DL (ref 7–25)
CALCIUM SERPL-MCNC: 8.5 MG/DL (ref 8.6–10.2)
CHLORIDE SERPL-SCNC: 107 MMOL/L (ref 98–110)
CHOLEST SERPL-MCNC: 159 MG/DL
CHOLEST/HDLC SERPL: 3.2 (CALC)
CO2 SERPL-SCNC: 25 MMOL/L (ref 20–32)
CREAT SERPL-MCNC: 0.65 MG/DL (ref 0.5–0.99)
EGFRCR SERPLBLD CKD-EPI 2021: 113 ML/MIN/1.73M2
EOSINOPHIL # BLD AUTO: 215 CELLS/UL (ref 15–500)
EOSINOPHIL NFR BLD AUTO: 5.5 %
ERYTHROCYTE [DISTWIDTH] IN BLOOD BY AUTOMATED COUNT: 15.4 % (ref 11–15)
GLUCOSE SERPL-MCNC: 93 MG/DL (ref 65–99)
HCT VFR BLD AUTO: 36.8 % (ref 35–45)
HCV AB SERPL QL IA: NORMAL
HDLC SERPL-MCNC: 50 MG/DL
HGB BLD-MCNC: 12 G/DL (ref 11.7–15.5)
LAMOTRIGINE SERPL-MCNC: 1 MCG/ML (ref 2.5–15)
LDLC SERPL CALC-MCNC: 89 MG/DL (CALC)
LYMPHOCYTES # BLD AUTO: 1225 CELLS/UL (ref 850–3900)
LYMPHOCYTES NFR BLD AUTO: 31.4 %
MCH RBC QN AUTO: 28.1 PG (ref 27–33)
MCHC RBC AUTO-ENTMCNC: 32.6 G/DL (ref 32–36)
MCV RBC AUTO: 86.2 FL (ref 80–100)
MONOCYTES # BLD AUTO: 374 CELLS/UL (ref 200–950)
MONOCYTES NFR BLD AUTO: 9.6 %
NEUTROPHILS # BLD AUTO: 2036 CELLS/UL (ref 1500–7800)
NEUTROPHILS NFR BLD AUTO: 52.2 %
NONHDLC SERPL-MCNC: 109 MG/DL (CALC)
PLATELET # BLD AUTO: 186 THOUSAND/UL (ref 140–400)
PMV BLD REES-ECKER: 9 FL (ref 7.5–12.5)
POTASSIUM SERPL-SCNC: 4 MMOL/L (ref 3.5–5.3)
PROT SERPL-MCNC: 6.6 G/DL (ref 6.1–8.1)
RBC # BLD AUTO: 4.27 MILLION/UL (ref 3.8–5.1)
SODIUM SERPL-SCNC: 140 MMOL/L (ref 135–146)
TOPIRAMATE SERPL-MCNC: 1.4 MCG/ML
TRIGL SERPL-MCNC: 106 MG/DL
TSH SERPL-ACNC: 4.07 MIU/L
URATE SERPL-MCNC: 4.8 MG/DL (ref 2.5–7)
VIT B12 SERPL-MCNC: 392 PG/ML (ref 200–1100)
WBC # BLD AUTO: 3.9 THOUSAND/UL (ref 3.8–10.8)

## 2025-02-14 ENCOUNTER — OFFICE VISIT (OUTPATIENT)
Dept: PALLIATIVE MEDICINE | Facility: HOSPITAL | Age: 45
End: 2025-02-14
Payer: COMMERCIAL

## 2025-02-14 ENCOUNTER — APPOINTMENT (OUTPATIENT)
Dept: PHARMACY | Facility: HOSPITAL | Age: 45
End: 2025-02-14
Payer: COMMERCIAL

## 2025-02-14 VITALS
WEIGHT: 217 LBS | BODY MASS INDEX: 42.38 KG/M2 | OXYGEN SATURATION: 98 % | DIASTOLIC BLOOD PRESSURE: 72 MMHG | SYSTOLIC BLOOD PRESSURE: 112 MMHG | HEART RATE: 94 BPM | TEMPERATURE: 98.2 F

## 2025-02-14 DIAGNOSIS — R53.83 OTHER FATIGUE: ICD-10-CM

## 2025-02-14 DIAGNOSIS — F41.9 ANXIETY AND DEPRESSION: ICD-10-CM

## 2025-02-14 DIAGNOSIS — F32.A ANXIETY AND DEPRESSION: ICD-10-CM

## 2025-02-14 DIAGNOSIS — K59.09 CHRONIC CONSTIPATION: ICD-10-CM

## 2025-02-14 DIAGNOSIS — G89.3 CANCER RELATED PAIN: Primary | ICD-10-CM

## 2025-02-14 PROCEDURE — 99214 OFFICE O/P EST MOD 30 MIN: CPT | Performed by: NURSE PRACTITIONER

## 2025-02-14 ASSESSMENT — PAIN SCALES - GENERAL: PAINLEVEL_OUTOF10: 3

## 2025-02-14 NOTE — PROGRESS NOTES
SUPPORTIVE AND PALLIATIVE ONCOLOGY OUTPATIENT FOLLOW-UP    SERVICE DATE: 11/20/2024    Subjective   HISTORY OF PRESENT ILLNESS: Isaias Chamorro is a 44 y.o. female who presents with past medical history of Hodgkin's Lymphoma, originally diagnosed June 2013, with recent relapse in March of 2021.  She received RT therapy to paraspinal mass, was pursuing Pembrolizumab but developed pneumonitis. Currently on surveillance.   Patient follows with Supportive Oncology for pain and further symptom management.       2/14/25: Pain stable. Having issues with fatigue. Sleeping 16-18hrs per day. This started 2 months ago. PCP is working this up.     Pain Assessment:  2-3/10  Neck / left shoulder / radiating to arm to elbow     Symptom Assessment:  Pain:a little   Numbness or Tingling in hands/feet/other: none  Sore Muscles/Spasms: none  Headache: a little   Dizziness:none  Constipation: a little   Diarrhea: none  Nausea: none  Vomiting: none  Lack of Appetite: none   Weight Loss: none  Lack of Energy: very much  Difficulty Sleeping: a little  Worrying: none  Anxiety: a little  Depression: a little  Shortness of breath: none  Other: none      Information obtained from: chart review and interview of patient  ______________________________________________________________________        Objective     CREATININE   Date Value Ref Range Status   02/06/2025 0.65 0.50 - 0.99 mg/dL Final     AST   Date Value Ref Range Status   02/06/2025 17 10 - 30 U/L Final     ALT   Date Value Ref Range Status   02/06/2025 14 6 - 29 U/L Final     HEMOGLOBIN   Date Value Ref Range Status   02/06/2025 12.0 11.7 - 15.5 g/dL Final     PLATELET COUNT   Date Value Ref Range Status   02/06/2025 186 140 - 400 Thousand/uL Final       PHYSICAL EXAMINATION   Vital Signs:       8/18/2024     9:27 AM 9/24/2024    12:18 PM 11/5/2024    11:33 AM 11/20/2024    12:54 PM 1/31/2025     2:24 PM 2/10/2025     1:26 PM 2/14/2025    12:49 PM   Vitals   Systolic 128  "123 131 112 125 124 112   Diastolic 81 74 76 73 84 80 72   BP Location   Left arm Left arm Left arm  Left arm   Heart Rate 93 80 69 102 73  94   Temp 36.7 °C (98.1 °F) 36.2 °C (97.2 °F) 35.9 °C (96.6 °F) 36.6 °C (97.9 °F) 36.4 °C (97.6 °F)  36.8 °C (98.2 °F)   Resp 16 16 16 16 16     Height  1.486 m (4' 10.5\")    1.524 m (5')    Weight (lb) 200.62 201 211.86 211.9 219 224 217   BMI 41.22 kg/m2 41.29 kg/m2 43.52 kg/m2 43.53 kg/m2 44.99 kg/m2 43.75 kg/m2 42.38 kg/m2   BSA (m2) 1.94 m2 1.94 m2 1.99 m2 1.99 m2 2.02 m2 2.08 m2 2.04 m2   Visit Report   Report Report Report Report Report           Physical Exam  Vitals reviewed.   Constitutional:       Appearance: Normal appearance.   HENT:      Head: Normocephalic.   Cardiovascular:      Rate and Rhythm: Normal rate and regular rhythm.   Pulmonary:      Effort: Pulmonary effort is normal.   Abdominal:      General: Abdomen is flat.      Palpations: Abdomen is soft.   Musculoskeletal:         General: Normal range of motion.   Skin:     General: Skin is warm and dry.   Neurological:      General: No focal deficit present.      Mental Status: She is alert and oriented to person, place, and time. Mental status is at baseline.   Psychiatric:         Mood and Affect: Mood normal.         Behavior: Behavior normal.         Thought Content: Thought content normal.         Judgment: Judgment normal.       ASSESSMENT/PLAN    Pain  Pain is: chronic - post cancer therapy  Type: somatic and neuropathic  Home regimen:   - Continue Methadone 2.5/2.5/5mg   - EKG (12/29/23) . EKG (11/20/2024) . Redo with each Methadone increase or yearly  - Continue Oxycodone 2.5-5mg [id number] PRN  - Continue Topiramate 100mg TID  - Following with Dr Beebe - planning to schedule follow-up for acupuuncture for headaches - also may assist with pain and anxiety  - Pursuing massage therapy/stretching  - Continue Aquatic Therapy/PT  Intolerances/previously tried:   - Unable to take NSAIDs d/t " bariatric surgery and Abreu's Esophagus  - Gabapentin - did not tolerate - excessive weight gain  - Pregabalin - did not tolerate  - Duloxetine - did not tolerate in past  - D/C Nortriptyline 10mg once daily at bed due to concerns of serotonin syndrome      Opioid Use  Medication Management:   - OARRS report reviewed with no aberrant behavior; consistent with  prescriptions/records and patient history  - MED 11.25.  Overdose Risk Score 600.   This has been discussed with patient.   - We will continue to closely monitor the patient for signs of prescription misuse including UDS, OARRS review and subjective reports at each visit.  - Concurrent benzodiazepine use with lorazepam, educated on risks.  - I am a provider who either is or has consulted and collaborated with a provider certified in Hospice and Palliative Medicine and have conducted a face-face visit and examination for this patient.  - Routine Urine Drug Screen: 5/15/2024 appropriately + for prescribed medications and - for illicits.  - Controlled Substance Agreement: 5/15/2024  - Specifically discussed that controlled substance prescriptions will only be provided by our group as outlined in the completed agreement  - Prescribed naloxone: 5/6/22  - Red Flags: none     Constipation (Chronic in nature)  At risk for constipation related to opioids.  Home regimen:   - Following with Gastro Dr. Borrero  - Continue Senna-S PRN and fiber gummy prn   - Continue Milk of Magnesia 24% Concentrate - 10mL once daily PRN for severe constipation     Nausea (improved)  - Continue Ondansetron 8mg [id number] PRN  - Continue Lorazepam 0.5mg, 1/2-1 tab q8 hours PRN     Altered Mood  Chronic anxiety and depression related to hx of bipolar disorder .  Home regimen:   - Managed by outside provider - appreciate recs  - Following with trauma counselor  - Pursuing acupuncture/massage  - Continue Lamictal 100mg daily  - Continue Sertraline 100mg daily  - Continue Lorazepam 0.5mg, 1/2 to  1 tablet [id number] PRN for anxiety    Fatigue   - w/o per PCP     Supportive and Palliative Oncology Encounter:  Emotional support provided  Coordination of care  Will continue to follow and address symptoms as needed    Next Follow-Up Visit:  Return to clinic in 3 months     Signature and billing  Medical complexity was moderate level due to due to complexity of problems, extensive data review, and high risk of management/treatment.  Time was spent on the following: Prep Time, Time Directly with Patient/Family/Caregiver, Documentation Time. Total time spent: 35      Data  Diagnostic tests and information reviewed for today's visit:  Most recent labs and imaging results, Medications     Some elements copied from Palliative Care note on 11/202024, the elements have been updated and all reflect current decision making from today, 2/14/2024.    Plan of Care discussed with: Patient    SIGNATURE: MORRO Shahid-CNP    Contact information:  Supportive and Palliative Oncology  Monday-Friday 8 AM-5 PM  Phone:  [phone], press option #5, then option #1.   Or Epic Secure Chat

## 2025-02-15 LAB
PTH-INTACT SERPL-MCNC: 67 PG/ML (ref 16–77)
TESTOST FREE SERPL-MCNC: NORMAL PG/ML
TESTOST SERPL-MCNC: NORMAL NG/DL

## 2025-02-21 LAB
PTH-INTACT SERPL-MCNC: 67 PG/ML (ref 16–77)
TESTOST FREE SERPL-MCNC: 2.1 PG/ML (ref 0.1–6.4)
TESTOST SERPL-MCNC: 18 NG/DL (ref 2–45)

## 2025-02-24 LAB
CYTOLOGY CMNT CVX/VAG CYTO-IMP: NORMAL
HPV HR 12 DNA GENITAL QL NAA+PROBE: NEGATIVE
HPV HR GENOTYPES PNL CVX NAA+PROBE: NEGATIVE
HPV16 DNA SPEC QL NAA+PROBE: NEGATIVE
HPV18 DNA SPEC QL NAA+PROBE: NEGATIVE
LAB AP CONTRACEPTIVE HISTORY: NORMAL
LAB AP HPV GENOTYPE QUESTION: YES
LAB AP HPV HR: NORMAL
LAB AP PREVIOUS ABNORMAL HISTORY: NORMAL
LABORATORY COMMENT REPORT: NORMAL
PATH REPORT.TOTAL CANCER: NORMAL

## 2025-02-28 ENCOUNTER — TELEPHONE (OUTPATIENT)
Dept: ADMISSION | Facility: HOSPITAL | Age: 45
End: 2025-02-28
Payer: COMMERCIAL

## 2025-02-28 DIAGNOSIS — G62.9 POLYNEUROPATHY: ICD-10-CM

## 2025-02-28 DIAGNOSIS — G89.3 CHRONIC PAIN DUE TO NEOPLASM: ICD-10-CM

## 2025-02-28 DIAGNOSIS — G89.3 CANCER RELATED PAIN: ICD-10-CM

## 2025-02-28 RX ORDER — METHADONE HYDROCHLORIDE 5 MG/1
TABLET ORAL
Qty: 60 TABLET | Refills: 0 | Status: SHIPPED | OUTPATIENT
Start: 2025-02-28 | End: 2025-03-30

## 2025-02-28 RX ORDER — TOPIRAMATE 100 MG/1
100 TABLET, FILM COATED ORAL 3 TIMES DAILY
Qty: 90 TABLET | Refills: 2 | Status: SHIPPED | OUTPATIENT
Start: 2025-02-28 | End: 2025-05-29

## 2025-02-28 RX ORDER — OXYCODONE HYDROCHLORIDE 5 MG/1
5 TABLET ORAL EVERY 12 HOURS PRN
Qty: 60 TABLET | Refills: 0 | Status: SHIPPED | OUTPATIENT
Start: 2025-02-28 | End: 2025-03-30

## 2025-02-28 NOTE — TELEPHONE ENCOUNTER
Pt requesting refills  Topiramate 100mg. 1 tablet TID- pt wants to ensure generic tablets ordered not capsules.   Oxycodone 5mg. 1 tablet every 12 hrs prn  Methadone 5mg. 2.5mg in morning, 2.5mg midday and 5 mg at HS  Pharmacy: [facility] in [place]  Last FUV 2/14, next FUV 5/9

## 2025-03-07 ENCOUNTER — INFUSION (OUTPATIENT)
Dept: HEMATOLOGY/ONCOLOGY | Facility: CLINIC | Age: 45
End: 2025-03-07
Payer: COMMERCIAL

## 2025-03-07 VITALS
SYSTOLIC BLOOD PRESSURE: 107 MMHG | BODY MASS INDEX: 40.96 KG/M2 | DIASTOLIC BLOOD PRESSURE: 74 MMHG | OXYGEN SATURATION: 96 % | HEIGHT: 59 IN | RESPIRATION RATE: 16 BRPM | HEART RATE: 88 BPM | WEIGHT: 203.15 LBS | TEMPERATURE: 97 F

## 2025-03-07 DIAGNOSIS — C81.91 HODGKIN LYMPHOMA OF LYMPH NODES OF NECK, UNSPECIFIED HODGKIN LYMPHOMA TYPE (MULTI): ICD-10-CM

## 2025-03-07 DIAGNOSIS — C81.90 HODGKIN LYMPHOMA, UNSPECIFIED HODGKIN LYMPHOMA TYPE, UNSPECIFIED BODY REGION (MULTI): ICD-10-CM

## 2025-03-07 PROCEDURE — 2500000004 HC RX 250 GENERAL PHARMACY W/ HCPCS (ALT 636 FOR OP/ED): Performed by: NURSE PRACTITIONER

## 2025-03-07 PROCEDURE — 96523 IRRIG DRUG DELIVERY DEVICE: CPT

## 2025-03-07 RX ORDER — HEPARIN 100 UNIT/ML
500 SYRINGE INTRAVENOUS AS NEEDED
OUTPATIENT
Start: 2025-03-07

## 2025-03-07 RX ORDER — HEPARIN SODIUM,PORCINE/PF 10 UNIT/ML
50 SYRINGE (ML) INTRAVENOUS AS NEEDED
Status: DISCONTINUED | OUTPATIENT
Start: 2025-03-07 | End: 2025-03-07 | Stop reason: HOSPADM

## 2025-03-07 RX ORDER — HEPARIN SODIUM,PORCINE/PF 10 UNIT/ML
50 SYRINGE (ML) INTRAVENOUS AS NEEDED
OUTPATIENT
Start: 2025-03-07

## 2025-03-07 RX ORDER — HEPARIN 100 UNIT/ML
500 SYRINGE INTRAVENOUS AS NEEDED
Status: DISCONTINUED | OUTPATIENT
Start: 2025-03-07 | End: 2025-03-07 | Stop reason: HOSPADM

## 2025-03-07 RX ADMIN — HEPARIN 500 UNITS: 100 SYRINGE at 13:12

## 2025-03-07 ASSESSMENT — PAIN SCALES - GENERAL: PAINLEVEL_OUTOF10: 3

## 2025-03-07 NOTE — PROGRESS NOTES
[facility] Infusion Note     Isaias Chamorro is a 44 y.o. year old female here today,03/07/25,  in the [facility] infusion center for port flush today. RN educated patient on importance of port flush. Patient acknowledges.  Patient aware of next port flush scheduled.           Medications given:  Administrations This Visit       heparin flush 100 unit/mL syringe 500 Units       Admin Date  03/07/2025 Action  Given Dose  500 Units Route  intra-catheter Documented By  Beckie Lal RN                    Pt tolerated mediport access well and was discharged to home in stable condition. Pt ambulated  off the unit independently with a slow and steady gait. Pt had no further questions or concerns at this time.

## 2025-03-28 ENCOUNTER — PATIENT MESSAGE (OUTPATIENT)
Dept: PRIMARY CARE | Facility: CLINIC | Age: 45
End: 2025-03-28
Payer: COMMERCIAL

## 2025-03-28 DIAGNOSIS — K90.829 SHORT BOWEL SYNDROME, UNSPECIFIED WHETHER COLON IN CONTINUITY: ICD-10-CM

## 2025-03-28 DIAGNOSIS — K59.09 CHRONIC CONSTIPATION: Primary | ICD-10-CM

## 2025-04-04 ENCOUNTER — TELEPHONE (OUTPATIENT)
Dept: ADMISSION | Facility: HOSPITAL | Age: 45
End: 2025-04-04
Payer: COMMERCIAL

## 2025-04-04 DIAGNOSIS — G89.3 CANCER RELATED PAIN: ICD-10-CM

## 2025-04-04 DIAGNOSIS — G89.3 CHRONIC PAIN DUE TO NEOPLASM: ICD-10-CM

## 2025-04-04 RX ORDER — OXYCODONE HYDROCHLORIDE 5 MG/1
5 TABLET ORAL EVERY 12 HOURS PRN
Qty: 60 TABLET | Refills: 0 | Status: SHIPPED | OUTPATIENT
Start: 2025-04-04 | End: 2025-05-04

## 2025-04-04 RX ORDER — METHADONE HYDROCHLORIDE 5 MG/1
TABLET ORAL
Qty: 60 TABLET | Refills: 0 | Status: SHIPPED | OUTPATIENT
Start: 2025-04-04 | End: 2025-05-04

## 2025-04-04 NOTE — TELEPHONE ENCOUNTER
Refills pended to provider for oxycodone IR 5 mg BID 60/30 and methadone 2.5/2.5/5 60/30.  OARRS reviewed.  Due for refill.  Patient to follow up with Radha Quan on 5/9.

## 2025-04-04 NOTE — TELEPHONE ENCOUNTER
Refill Request  Oxycodone 5mg every 12 hrs PRN  Methadone 5mg 2.5mg in AM, 2.5mg evening, and 5mg nightly    Preferred Pharmacy  [facility] [address] [place]

## 2025-04-10 ENCOUNTER — PATIENT MESSAGE (OUTPATIENT)
Dept: PRIMARY CARE | Facility: CLINIC | Age: 45
End: 2025-04-10
Payer: COMMERCIAL

## 2025-04-10 DIAGNOSIS — R73.01 ABNORMAL FASTING GLUCOSE: ICD-10-CM

## 2025-04-10 DIAGNOSIS — Z98.84 STATUS POST BARIATRIC SURGERY: ICD-10-CM

## 2025-04-10 DIAGNOSIS — C81.90 HODGKIN LYMPHOMA, UNSPECIFIED HODGKIN LYMPHOMA TYPE, UNSPECIFIED BODY REGION (MULTI): ICD-10-CM

## 2025-04-10 DIAGNOSIS — G47.33 OSA (OBSTRUCTIVE SLEEP APNEA): Primary | ICD-10-CM

## 2025-04-15 ENCOUNTER — APPOINTMENT (OUTPATIENT)
Facility: CLINIC | Age: 45
End: 2025-04-15
Payer: COMMERCIAL

## 2025-04-15 VITALS — WEIGHT: 203 LBS | HEIGHT: 58 IN | BODY MASS INDEX: 42.61 KG/M2

## 2025-04-15 DIAGNOSIS — Z98.84 STATUS POST BARIATRIC SURGERY: ICD-10-CM

## 2025-04-15 DIAGNOSIS — K22.70 BARRETT'S ESOPHAGUS WITHOUT DYSPLASIA: Primary | ICD-10-CM

## 2025-04-15 DIAGNOSIS — Z12.11 SPECIAL SCREENING FOR MALIGNANT NEOPLASMS, COLON: ICD-10-CM

## 2025-04-15 DIAGNOSIS — K59.01 SLOW TRANSIT CONSTIPATION: ICD-10-CM

## 2025-04-15 PROCEDURE — 99204 OFFICE O/P NEW MOD 45 MIN: CPT | Performed by: INTERNAL MEDICINE

## 2025-04-15 PROCEDURE — 3008F BODY MASS INDEX DOCD: CPT | Performed by: INTERNAL MEDICINE

## 2025-04-15 NOTE — PROGRESS NOTES
Primary Care Provider: Laureano Souza MD  Referring Provider: Laureano Souza MD  My final recommendations will be communicated back to the referring physician and/or primary care provider via shared medical records or via US mail.    Chief Complaint (Reason for visit):   Isaias Chamorro is a 44 y.o. female who presents for establish GI care    ASSESSMENT AND PLAN     Assessment & Plan  Abreu's esophagus without dysplasia  Pt states that she has Abreu's esophagus.  She has been getting upper endoscopies at [facility].  She moved to [place] and wants to establish care here    - I will get records from her previous endoscopy  - EGD with biopsies  Orders:    Esophagogastroduodenoscopy (EGD); Future    Status post bariatric surgery  Patient has history of Kat[place]-en-Y gastric bypass in 2007  Labs show that she is deficient in vitamin D.  She is taking vitamin D supplements    - I will check for other common nutritional deficiencies  Orders:    Referral to Gastroenterology    Vitamin B1, Whole Blood; Future    Vitamin B6; Future    Iron and TIBC; Future    Ferritin; Future    Folate; Future    Vitamin A; Future    Protime-INR; Future    Colonoscopy Screening; High Risk Patient; prior polyps; Future    Slow transit constipation  Patient claims that she has constipation and was on Linzess before.   Her medication review shows that she is on oxycodone as well as a bowel.  Both of these can contribute to constipation as well    - Will ask her to reinitiate the Linzess at 145 mcg dose  -Colonoscopy  -She has to hold the Zepbound for a week prior to her colonoscopy  -Neck step will be to add a prokinetic/OIC medication  Orders:    Colonoscopy Screening; High Risk Patient; prior polyps; Future    linaCLOtide (Linzess) 145 mcg capsule; Take 1 capsule (145 mcg) by mouth once daily in the morning. Take before meals. Do not crush or chew.      I discussed the risks, benefits and  alternative(s) of the procedure(s) with the patient. Pt verbalizes understanding and is willing to proceed. All questions were answered.    Diaz Garcia MD, MS    SUBJECTIVE   HPI: History obtained from patient    44-year-old female who comes to establish care    Pt states that she has Abreu's esophagus.  She has been getting upper endoscopies at [facility].  She moved to [place] and wants to establish care here  It is unclear whether she is on PPI or not    Patient claims that she has constipation and was on Linzess before.   Her medication review shows that she is on oxycodone as well as a bowel.  Both of these can contribute to constipation as well      Past Medical History:   Diagnosis Date    Bipolar disorder, currently in remission, most recent episode unspecified (Multi) 08/17/2016    History of depressed bipolar disorder    Bipolar disorder, unspecified (Multi) 04/04/2014    Bipolar affective    Dorsalgia, unspecified 02/26/2020    Mid back pain    Dysplasia of cervix uteri, unspecified 10/26/2017    Cervical intraepithelial neoplasia (CHRISTINA)    Encounter for follow-up examination after completed treatment for conditions other than malignant neoplasm 09/22/2022    Hospital discharge follow-up    Encounter for gynecological examination (general) (routine) without abnormal findings 09/01/2017    Well woman exam with routine gynecological exam    Gastro-esophageal reflux disease without esophagitis 08/31/2016    Esophageal reflux finding    Hypothyroidism 2006?    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    Moderate cervical dysplasia 12/08/2017    CHRISTINA II (cervical intraepithelial neoplasia II)    Other conditions influencing health status 05/27/2020    Diabetes mellitus type 2, uncontrolled    Other conditions influencing health status 10/26/2017    Encounter for procedure    Other general  symptoms and signs 04/11/2018    Flu-like symptoms    Other long term (current) drug therapy 03/04/2015    Long term use of drug    Other specified disorders of nose and nasal sinuses 12/01/2016    Sinus pressure    Other specified predominantly sexually transmitted diseases     Other specified predominantly sexually transmitted diseases    Otitis media, unspecified, right ear 11/21/2017    Otitis media of right ear    Pain in unspecified joint 05/08/2018    Chronic pain of multiple joints    Peripheral neuropathy 2003    Personal history of antineoplastic chemotherapy 04/02/2014    Personal history of chemotherapy    Personal history of Hodgkin lymphoma     History of Hodgkin's lymphoma    Personal history of other diseases of the circulatory system 12/19/2016    History of hypertension    Personal history of other diseases of the circulatory system 08/23/2017    History of hypertension    Personal history of other diseases of the digestive system 07/31/2017    History of Abreu's esophagus    Personal history of other diseases of the female genital tract 08/17/2016    History of polycystic ovarian syndrome    Personal history of other diseases of the musculoskeletal system and connective tissue 08/07/2020    History of neck pain    Personal history of other diseases of the respiratory system 12/01/2016    History of sore throat    Personal history of other diseases of the respiratory system 12/19/2016    History of influenza    Personal history of other diseases of the respiratory system 07/23/2021    History of acute sinusitis    Personal history of other diseases of the respiratory system 07/05/2016    History of acute bronchitis    Personal history of other diseases of the respiratory system 02/04/2021    History of acute sinusitis    Personal history of other diseases of the respiratory system 04/11/2018    History of acute bronchitis    Personal history of other endocrine, nutritional and metabolic disease  01/19/2016    History of hypothyroidism    Personal history of other endocrine, nutritional and metabolic disease 04/04/2014    History of hypothyroidism    Personal history of other medical treatment     History of screening mammography    Personal history of other specified conditions 10/29/2019    History of fatigue    Personal history of other specified conditions 07/19/2017    History of chronic cough    Personal history of other specified conditions 04/11/2018    History of wheezing    Personal history of other venous thrombosis and embolism     H/O blood clots    Personal history of pneumonia (recurrent) 06/06/2014    History of pneumonia    Prediabetes 11/01/2019    Pre-diabetes    Protein S deficiency (Multi)     Splenic infarct     Type 2 diabetes mellitus 2018    Vasomotor rhinitis 07/31/2017    Non-allergic vasomotor rhinitis    Vitamin D deficiency 2006       Past Surgical History:   Procedure Laterality Date    CT GUIDED IMAGING FOR NEEDLE PLACEMENT  05/31/2013    CT GUIDED IMAGING FOR NEEDLE PLACEMENT [facility]    FOOT SURGERY  01/19/2016    Foot Surgery    GASTRIC RESTRICTION SURGERY  2020    KNEE SURGERY  01/19/2016    Knee Surgery    MR HEAD ANGIO WO IV CONTRAST  03/02/2015    MR HEAD ANGIO WO IV CONTRAST 3/2/2015 [facility]    OTHER SURGICAL HISTORY  12/16/2020    Katy-en-Y gastric bypass    OTHER SURGICAL HISTORY  04/02/2014    Biopsy Bone Marrow    OTHER SURGICAL HISTORY  01/19/2016    Wrist Carpectomy    OTHER SURGICAL HISTORY  04/04/2014    Tunneled Central IV Removal With Port    OTHER SURGICAL HISTORY  04/04/2014    Thoracoscopy Of The Mediastinal Space    TONSILLECTOMY  01/19/2016    Tonsillectomy    WISDOM TOOTH EXTRACTION  1995       Current Outpatient Medications   Medication Sig Dispense Refill    albuterol 90 mcg/actuation inhaler Inhale 2 puffs every 4 hours if needed for wheezing. 18 g 11    biotin 10,000 mcg capsule Take 1 capsule (10 mg) by mouth once daily. 90  capsule 3    calcium cit-mag-D3-Zn--maxine (Calcium Citrate Plus) 250 mg-40 mg- 125 unit-3.75mg tablet Take 1 tablet by mouth once daily. 90 tablet 3    cholecalciferol (Vitamin D3) 5,000 Units tablet Take 1 tablet (5,000 Units) by mouth once daily. 90 tablet 3    cyanocobalamin (Vitamin B-12) 1,000 mcg tablet Take 1 tablet (1,000 mcg) by mouth once daily. 90 tablet 3    estradiol (Vivelle-DOT) 0.0375 mg/24 hr Place 1 patch over 96 hours on the skin 2 times a week. 8 patch 11    ferrous sulfate 325 (65 Fe) MG EC tablet Take 1 tablet by mouth once daily with breakfast. 90 tablet 3    fexofenadine (Allegra) 180 mg tablet Take by mouth.      lamoTRIgine (LaMICtal) 100 mg tablet Take 1 tablet (100 mg) by mouth once daily.      levonorgestrel (Mirena) 21 mcg/24 hours (8 yrs) 52 mg IUD Intrauterine      levothyroxine (Synthroid, Levoxyl) 200 mcg tablet Take 1 tablet (200 mcg) by mouth once daily in the morning. Take before meals. 90 tablet 3    linaCLOtide (Linzess) 72 mcg capsule Take 1 capsule (72 mcg) by mouth once daily in the morning. Take before meals. Do not crush or chew.  CONFIRM DOSE WITH GI SPECIALIST IN APRIL 2025. 90 capsule 3    LORazepam (Ativan) 0.5 mg tablet Take 1 tablet (0.5 mg) by mouth 3 times a day as needed for anxiety. 90 tablet 2    methadone (Dolophine) 5 mg tablet Take 0.5 tablets (2.5 mg) by mouth once daily in the morning AND 0.5 tablets (2.5 mg) once daily in the evening AND 1 tablet (5 mg) once daily at bedtime. 60 tablet 0    montelukast (Singulair) 10 mg tablet Take 1 tablet (10 mg) by mouth once daily at bedtime. 90 tablet 3    multivitamin tablet Take 1 tablet by mouth once daily. 30 tablet 11    oxyCODONE (Roxicodone) 5 mg immediate release tablet Take 1 tablet (5 mg) by mouth every 12 hours if needed for severe pain (7 - 10). 60 tablet 0    sertraline (Zoloft) 100 mg tablet Take 1 tablet (100 mg) by mouth once daily.      tirzepatide, weight loss, (Zepbound) 2.5 mg/0.5 mL injection  "Inject 2.5 mg under the skin every 7 days. 4 each 1    tirzepatide, weight loss, (Zepbound) 5 mg/0.5 mL injection Inject 5 mg under the skin every 7 days. 4 each 2    topiramate (Topamax) 100 mg tablet Take 1 tablet (100 mg) by mouth 3 times a day. 90 tablet 2    ascorbic acid (Vitamin C) 100 mg tablet Take 1 tablet (100 mg) by mouth once daily. 90 tablet 3    budesonide (Rhinocort AQ) 32 mcg/actuation nasal spray Administer 2 sprays into each nostril once daily. 8.6 g 11    naloxone (Narcan) 4 mg/0.1 mL nasal spray ADMINISTER A SINGLE SPRAY INTRANASALLY INTO ONE NOSTRIL. CALL 911. MAY REPEAT X 1.       No current facility-administered medications for this visit.       Allergies   Allergen Reactions    Cephalexin Anaphylaxis, Rash and Unknown    Keytruda [Pembrolizumab] Anaphylaxis    Penicillins Anaphylaxis, Rash and Unknown    Lactose Unknown    Latex Hives and Unknown    Sulfa (Sulfonamide Antibiotics) Other and Unknown    Sulfamethoxazole Unknown    Azithromycin Rash    Clarithromycin Rash and Unknown    Erythromycin Base Rash    Macrolide Antibiotics Rash     OBJECTIVE   PHYSICAL EXAM:  Ht 1.473 m (4' 10\")   Wt 92.1 kg (203 lb)   BMI 42.43 kg/m²      LABS/IMAGING/SCOPES  Lab Results   Component Value Date    WBC 3.9 02/06/2025    HGB 12.0 02/06/2025    HCT 36.8 02/06/2025    MCV 86.2 02/06/2025     02/06/2025     Lab Results   Component Value Date    GLUCOSE 93 02/06/2025    CALCIUM 8.5 (L) 02/06/2025     02/06/2025    K 4.0 02/06/2025    CO2 25 02/06/2025     02/06/2025    BUN 15 02/06/2025    CREATININE 0.65 02/06/2025     Lab Results   Component Value Date    ALT 14 02/06/2025    AST 17 02/06/2025    ALKPHOS 75 02/06/2025    BILITOT 0.4 02/06/2025       === 04/29/24 ===    CT CHEST ABDOMEN PELVIS W IV CONTRAST    - Impression -  CHEST:  1. No intrathoracic enlarged lymphadenopathy. Stable right anterior  mediastinal nodule measuring 1.8 cm which did not show FDG uptake in  prior PET scan " and likely benign and could represent residual thymic  gland.  2. No suspicious pulmonary nodules.  3. Stable upper paramediastinal postradiation changes.    ABDOMEN-PELVIS:  1. No intra abdominopelvic enlarged lymphadenopathy.  2. No suspicious solid organ lesions. No hepatosplenomegaly.      MACRO:  None    Signed by: Silverio Noonan 4/30/2024 7:29 AM  Dictation workstation:   [id number]    Diaz Garcia MD, MS

## 2025-04-15 NOTE — LETTER
April 15, 2025     Laureano Souza MD  [address] [facility] [address]    Patient: Isaias Chamorro   YOB: 1980   Date of Visit: 4/15/2025       Dear Dr. Laureano Souza MD:    Thank you for referring Isaias Chamorro to me for evaluation. Below are my notes for this consultation.  If you have questions, please do not hesitate to call me. I look forward to following your patient along with you.       Sincerely,     Diaz Garcia MD, MS      CC: No Recipients  ______________________________________________________________________________________    Primary Care Provider: Laureano Souza MD  Referring Provider: Laureano Souza MD  My final recommendations will be communicated back to the referring physician and/or primary care provider via shared medical records or via US mail.    Chief Complaint (Reason for visit):   Isaias Chamorro is a 44 y.o. female who presents for establish GI care    ASSESSMENT AND PLAN     Assessment & Plan  Abreu's esophagus without dysplasia  Pt states that she has Abreu's esophagus.  She has been getting upper endoscopies at [facility].  She moved to [place] and wants to establish care here    - I will get records from her previous endoscopy  - EGD with biopsies  Orders:  •  Esophagogastroduodenoscopy (EGD); Future    Status post bariatric surgery  Patient has history of Katy-en-Y gastric bypass in 2007  Labs show that she is deficient in vitamin D.  She is taking vitamin D supplements    - I will check for other common nutritional deficiencies  Orders:  •  Referral to Gastroenterology  •  Vitamin B1, Whole Blood; Future  •  Vitamin B6; Future  •  Iron and TIBC; Future  •  Ferritin; Future  •  Folate; Future  •  Vitamin A; Future  •  Protime-INR; Future  •  Colonoscopy Screening; High Risk Patient; prior polyps; Future    Slow transit constipation  Patient claims that she has constipation and was  on Linzess before.   Her medication review shows that she is on oxycodone as well as a bowel.  Both of these can contribute to constipation as well    - Will ask her to reinitiate the Linzess at 145 mcg dose  -Colonoscopy  -She has to hold the Zepbound for a week prior to her colonoscopy  -Neck step will be to add a prokinetic/OIC medication  Orders:  •  Colonoscopy Screening; High Risk Patient; prior polyps; Future  •  linaCLOtide (Linzess) 145 mcg capsule; Take 1 capsule (145 mcg) by mouth once daily in the morning. Take before meals. Do not crush or chew.      I discussed the risks, benefits and alternative(s) of the procedure(s) with the patient. Pt verbalizes understanding and is willing to proceed. All questions were answered.    Diaz Garcia MD, MS    SUBJECTIVE   HPI: History obtained from patient    44-year-old female who comes to establish care    Pt states that she has Abreu's esophagus.  She has been getting upper endoscopies at [facility].  She moved to [place] and wants to establish care here  It is unclear whether she is on PPI or not    Patient claims that she has constipation and was on Linzess before.   Her medication review shows that she is on oxycodone as well as a bowel.  Both of these can contribute to constipation as well      Past Medical History:   Diagnosis Date   • Bipolar disorder, currently in remission, most recent episode unspecified (Multi) 08/17/2016    History of depressed bipolar disorder   • Bipolar disorder, unspecified (Multi) 04/04/2014    Bipolar affective   • Dorsalgia, unspecified 02/26/2020    Mid back pain   • Dysplasia of cervix uteri, unspecified 10/26/2017    Cervical intraepithelial neoplasia (CHRISTINA)   • Encounter for follow-up examination after completed treatment for conditions other than malignant neoplasm 09/22/2022    Hospital discharge follow-up   • Encounter for gynecological examination (general) (routine) without abnormal findings  09/01/2017    Well woman exam with routine gynecological exam   • Gastro-esophageal reflux disease without esophagitis 08/31/2016    Esophageal reflux finding   • Hypothyroidism 2006?   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    Splenic infarct   • Moderate cervical dysplasia 12/08/2017    CHRISTINA II (cervical intraepithelial neoplasia II)   • Other conditions influencing health status 05/27/2020    Diabetes mellitus type 2, uncontrolled   • Other conditions influencing health status 10/26/2017    Encounter for procedure   • Other general symptoms and signs 04/11/2018    Flu-like symptoms   • Other long term (current) drug therapy 03/04/2015    Long term use of drug   • Other specified disorders of nose and nasal sinuses 12/01/2016    Sinus pressure   • Other specified predominantly sexually transmitted diseases     Other specified predominantly sexually transmitted diseases   • Otitis media, unspecified, right ear 11/21/2017    Otitis media of right ear   • Pain in unspecified joint 05/08/2018    Chronic pain of multiple joints   • Peripheral neuropathy 2003   • Personal history of antineoplastic chemotherapy 04/02/2014    Personal history of chemotherapy   • Personal history of Hodgkin lymphoma     History of Hodgkin's lymphoma   • Personal history of other diseases of the circulatory system 12/19/2016    History of hypertension   • Personal history of other diseases of the circulatory system 08/23/2017    History of hypertension   • Personal history of other diseases of the digestive system 07/31/2017    History of Abreu's esophagus   • Personal history of other diseases of the female genital tract 08/17/2016    History of polycystic ovarian syndrome   • Personal history of other diseases of the musculoskeletal system and connective tissue 08/07/2020    History of neck pain   • Personal history of other diseases of the respiratory system  12/01/2016    History of sore throat   • Personal history of other diseases of the respiratory system 12/19/2016    History of influenza   • Personal history of other diseases of the respiratory system 07/23/2021    History of acute sinusitis   • Personal history of other diseases of the respiratory system 07/05/2016    History of acute bronchitis   • Personal history of other diseases of the respiratory system 02/04/2021    History of acute sinusitis   • Personal history of other diseases of the respiratory system 04/11/2018    History of acute bronchitis   • Personal history of other endocrine, nutritional and metabolic disease 01/19/2016    History of hypothyroidism   • Personal history of other endocrine, nutritional and metabolic disease 04/04/2014    History of hypothyroidism   • Personal history of other medical treatment     History of screening mammography   • Personal history of other specified conditions 10/29/2019    History of fatigue   • Personal history of other specified conditions 07/19/2017    History of chronic cough   • Personal history of other specified conditions 04/11/2018    History of wheezing   • Personal history of other venous thrombosis and embolism     H/O blood clots   • Personal history of pneumonia (recurrent) 06/06/2014    History of pneumonia   • Prediabetes 11/01/2019    Pre-diabetes   • Protein S deficiency (Multi)    • Splenic infarct    • Type 2 diabetes mellitus 2018   • Vasomotor rhinitis 07/31/2017    Non-allergic vasomotor rhinitis   • Vitamin D deficiency 2006       Past Surgical History:   Procedure Laterality Date   • CT GUIDED IMAGING FOR NEEDLE PLACEMENT  05/31/2013    CT GUIDED IMAGING FOR NEEDLE PLACEMENT [facility]   • FOOT SURGERY  01/19/2016    Foot Surgery   • GASTRIC RESTRICTION SURGERY  2020   • KNEE SURGERY  01/19/2016    Knee Surgery   • MR HEAD ANGIO WO IV CONTRAST  03/02/2015    MR HEAD ANGIO WO IV CONTRAST 3/2/2015 [facility]GAC[facility]   • OTHER  SURGICAL HISTORY  12/16/2020    Katy-en-Y gastric bypass   • OTHER SURGICAL HISTORY  04/02/2014    Biopsy Bone Marrow   • OTHER SURGICAL HISTORY  01/19/2016    Wrist Carpectomy   • OTHER SURGICAL HISTORY  04/04/2014    Tunneled Central IV Removal With Port   • OTHER SURGICAL HISTORY  04/04/2014    Thoracoscopy Of The Mediastinal Space   • TONSILLECTOMY  01/19/2016    Tonsillectomy   • WISDOM TOOTH EXTRACTION  1995       Current Outpatient Medications   Medication Sig Dispense Refill   • albuterol 90 mcg/actuation inhaler Inhale 2 puffs every 4 hours if needed for wheezing. 18 g 11   • biotin 10,000 mcg capsule Take 1 capsule (10 mg) by mouth once daily. 90 capsule 3   • calcium cit-mag-D3-Zn--maxine (Calcium Citrate Plus) 250 mg-40 mg- 125 unit-3.75mg tablet Take 1 tablet by mouth once daily. 90 tablet 3   • cholecalciferol (Vitamin D3) 5,000 Units tablet Take 1 tablet (5,000 Units) by mouth once daily. 90 tablet 3   • cyanocobalamin (Vitamin B-12) 1,000 mcg tablet Take 1 tablet (1,000 mcg) by mouth once daily. 90 tablet 3   • estradiol (Vivelle-DOT) 0.0375 mg/24 hr Place 1 patch over 96 hours on the skin 2 times a week. 8 patch 11   • ferrous sulfate 325 (65 Fe) MG EC tablet Take 1 tablet by mouth once daily with breakfast. 90 tablet 3   • fexofenadine (Allegra) 180 mg tablet Take by mouth.     • lamoTRIgine (LaMICtal) 100 mg tablet Take 1 tablet (100 mg) by mouth once daily.     • levonorgestrel (Mirena) 21 mcg/24 hours (8 yrs) 52 mg IUD Intrauterine     • levothyroxine (Synthroid, Levoxyl) 200 mcg tablet Take 1 tablet (200 mcg) by mouth once daily in the morning. Take before meals. 90 tablet 3   • linaCLOtide (Linzess) 72 mcg capsule Take 1 capsule (72 mcg) by mouth once daily in the morning. Take before meals. Do not crush or chew.  CONFIRM DOSE WITH GI SPECIALIST IN APRIL 2025. 90 capsule 3   • LORazepam (Ativan) 0.5 mg tablet Take 1 tablet (0.5 mg) by mouth 3 times a day as needed for anxiety. 90 tablet 2   •  "methadone (Dolophine) 5 mg tablet Take 0.5 tablets (2.5 mg) by mouth once daily in the morning AND 0.5 tablets (2.5 mg) once daily in the evening AND 1 tablet (5 mg) once daily at bedtime. 60 tablet 0   • montelukast (Singulair) 10 mg tablet Take 1 tablet (10 mg) by mouth once daily at bedtime. 90 tablet 3   • multivitamin tablet Take 1 tablet by mouth once daily. 30 tablet 11   • oxyCODONE (Roxicodone) 5 mg immediate release tablet Take 1 tablet (5 mg) by mouth every 12 hours if needed for severe pain (7 - 10). 60 tablet 0   • sertraline (Zoloft) 100 mg tablet Take 1 tablet (100 mg) by mouth once daily.     • tirzepatide, weight loss, (Zepbound) 2.5 mg/0.5 mL injection Inject 2.5 mg under the skin every 7 days. 4 each 1   • tirzepatide, weight loss, (Zepbound) 5 mg/0.5 mL injection Inject 5 mg under the skin every 7 days. 4 each 2   • topiramate (Topamax) 100 mg tablet Take 1 tablet (100 mg) by mouth 3 times a day. 90 tablet 2   • ascorbic acid (Vitamin C) 100 mg tablet Take 1 tablet (100 mg) by mouth once daily. 90 tablet 3   • budesonide (Rhinocort AQ) 32 mcg/actuation nasal spray Administer 2 sprays into each nostril once daily. 8.6 g 11   • naloxone (Narcan) 4 mg/0.1 mL nasal spray ADMINISTER A SINGLE SPRAY INTRANASALLY INTO ONE NOSTRIL. CALL 911. MAY REPEAT X 1.       No current facility-administered medications for this visit.       Allergies   Allergen Reactions   • Cephalexin Anaphylaxis, Rash and Unknown   • Keytruda [Pembrolizumab] Anaphylaxis   • Penicillins Anaphylaxis, Rash and Unknown   • Lactose Unknown   • Latex Hives and Unknown   • Sulfa (Sulfonamide Antibiotics) Other and Unknown   • Sulfamethoxazole Unknown   • Azithromycin Rash   • Clarithromycin Rash and Unknown   • Erythromycin Base Rash   • Macrolide Antibiotics Rash     OBJECTIVE   PHYSICAL EXAM:  Ht 1.473 m (4' 10\")   Wt 92.1 kg (203 lb)   BMI 42.43 kg/m²      LABS/IMAGING/SCOPES  Lab Results   Component Value Date    WBC 3.9 02/06/2025 "    HGB 12.0 02/06/2025    HCT 36.8 02/06/2025    MCV 86.2 02/06/2025     02/06/2025     Lab Results   Component Value Date    GLUCOSE 93 02/06/2025    CALCIUM 8.5 (L) 02/06/2025     02/06/2025    K 4.0 02/06/2025    CO2 25 02/06/2025     02/06/2025    BUN 15 02/06/2025    CREATININE 0.65 02/06/2025     Lab Results   Component Value Date    ALT 14 02/06/2025    AST 17 02/06/2025    ALKPHOS 75 02/06/2025    BILITOT 0.4 02/06/2025       === 04/29/24 ===    CT CHEST ABDOMEN PELVIS W IV CONTRAST    - Impression -  CHEST:  1. No intrathoracic enlarged lymphadenopathy. Stable right anterior  mediastinal nodule measuring 1.8 cm which did not show FDG uptake in  prior PET scan and likely benign and could represent residual thymic  gland.  2. No suspicious pulmonary nodules.  3. Stable upper paramediastinal postradiation changes.    ABDOMEN-PELVIS:  1. No intra abdominopelvic enlarged lymphadenopathy.  2. No suspicious solid organ lesions. No hepatosplenomegaly.      MACRO:  None    Signed by: Silverio Noonan 4/30/2024 7:29 AM  Dictation workstation:   [id number]    Diaz Garcia MD, MS

## 2025-04-15 NOTE — ASSESSMENT & PLAN NOTE
Pt states that she has Abreu's esophagus.  She has been getting upper endoscopies at [facility].  She moved to [place] and wants to establish care here    - I will get records from her previous endoscopy  - EGD with biopsies  Orders:    Esophagogastroduodenoscopy (EGD); Future

## 2025-04-16 RX ORDER — SODIUM, POTASSIUM,MAG SULFATES 17.5-3.13G
SOLUTION, RECONSTITUTED, ORAL ORAL
Qty: 1 EACH | Refills: 0 | Status: SHIPPED | OUTPATIENT
Start: 2025-04-16

## 2025-04-25 ENCOUNTER — APPOINTMENT (OUTPATIENT)
Dept: RADIOLOGY | Facility: CLINIC | Age: 45
End: 2025-04-25
Payer: COMMERCIAL

## 2025-04-25 DIAGNOSIS — Z12.31 ENCOUNTER FOR SCREENING MAMMOGRAM FOR MALIGNANT NEOPLASM OF BREAST: ICD-10-CM

## 2025-04-25 PROCEDURE — 77067 SCR MAMMO BI INCL CAD: CPT

## 2025-04-28 ENCOUNTER — TELEPHONE (OUTPATIENT)
Dept: ADMISSION | Facility: HOSPITAL | Age: 45
End: 2025-04-28
Payer: COMMERCIAL

## 2025-04-28 DIAGNOSIS — F41.9 ANXIETY: ICD-10-CM

## 2025-04-28 DIAGNOSIS — Z51.5 ENCOUNTER FOR PALLIATIVE CARE: ICD-10-CM

## 2025-04-28 RX ORDER — LORAZEPAM 0.5 MG/1
0.5 TABLET ORAL 3 TIMES DAILY PRN
Qty: 90 TABLET | Refills: 2 | Status: SHIPPED | OUTPATIENT
Start: 2025-04-28 | End: 2025-07-27

## 2025-04-28 NOTE — TELEPHONE ENCOUNTER
Reason for Conversation  Med Refill    Background   Pt requesting refill   Lorazepam 0.5mg. 1 tablet TID prn  Pharmacy: [facility] in [place]  Last FUV 2/14, next FUV 5/9

## 2025-04-28 NOTE — TELEPHONE ENCOUNTER
Reason for Conversation  Med Refill      Refill pended to provider for lorazepam 0.5 mg TID 90/3 month supply.  OARRS reviewed.  Due for refill.  Patient to follow up with Radha Quan on 5/9.

## 2025-05-01 ENCOUNTER — OFFICE VISIT (OUTPATIENT)
Dept: PRIMARY CARE | Facility: CLINIC | Age: 45
End: 2025-05-01
Payer: COMMERCIAL

## 2025-05-01 VITALS
DIASTOLIC BLOOD PRESSURE: 69 MMHG | BODY MASS INDEX: 40.72 KG/M2 | HEIGHT: 58 IN | TEMPERATURE: 97.7 F | SYSTOLIC BLOOD PRESSURE: 100 MMHG | RESPIRATION RATE: 16 BRPM | WEIGHT: 194 LBS | HEART RATE: 92 BPM

## 2025-05-01 DIAGNOSIS — E03.9 ACQUIRED HYPOTHYROIDISM: ICD-10-CM

## 2025-05-01 DIAGNOSIS — Z00.01 ENCOUNTER FOR GENERAL ADULT MEDICAL EXAMINATION WITH ABNORMAL FINDINGS: ICD-10-CM

## 2025-05-01 DIAGNOSIS — K59.09 CHRONIC CONSTIPATION: ICD-10-CM

## 2025-05-01 DIAGNOSIS — C81.90 HODGKIN LYMPHOMA, UNSPECIFIED HODGKIN LYMPHOMA TYPE, UNSPECIFIED BODY REGION (MULTI): ICD-10-CM

## 2025-05-01 DIAGNOSIS — Z98.84 STATUS POST BARIATRIC SURGERY: ICD-10-CM

## 2025-05-01 DIAGNOSIS — I10 ESSENTIAL HYPERTENSION: ICD-10-CM

## 2025-05-01 DIAGNOSIS — H92.02 LEFT EAR PAIN: Primary | ICD-10-CM

## 2025-05-01 DIAGNOSIS — Z13.1 SCREENING FOR DIABETES MELLITUS (DM): ICD-10-CM

## 2025-05-01 DIAGNOSIS — G43.709 CHRONIC MIGRAINE W/O AURA W/O STATUS MIGRAINOSUS, NOT INTRACTABLE: ICD-10-CM

## 2025-05-01 PROCEDURE — 3008F BODY MASS INDEX DOCD: CPT | Performed by: FAMILY MEDICINE

## 2025-05-01 PROCEDURE — 1036F TOBACCO NON-USER: CPT | Performed by: FAMILY MEDICINE

## 2025-05-01 PROCEDURE — 99396 PREV VISIT EST AGE 40-64: CPT | Performed by: FAMILY MEDICINE

## 2025-05-01 PROCEDURE — 3074F SYST BP LT 130 MM HG: CPT | Performed by: FAMILY MEDICINE

## 2025-05-01 PROCEDURE — 3078F DIAST BP <80 MM HG: CPT | Performed by: FAMILY MEDICINE

## 2025-05-01 PROCEDURE — G2211 COMPLEX E/M VISIT ADD ON: HCPCS | Performed by: FAMILY MEDICINE

## 2025-05-01 RX ORDER — DOXYCYCLINE 100 MG/1
100 CAPSULE ORAL 2 TIMES DAILY
Qty: 28 CAPSULE | Refills: 0 | Status: SHIPPED | OUTPATIENT
Start: 2025-05-01 | End: 2025-05-15

## 2025-05-01 NOTE — PATIENT INSTRUCTIONS
USE [employer].  CALL FOR NEEDS [phone].   KEEP ON MEDICATIONS:    DOXYCYCLINE FOR SINUS INFECTION.  SAMPLED UBRELVY FOR [facility]RAINE [facility]AP[facility] AND ORDERED FOR PREVENTIVE EVERY OTHER DAY SCHEDULED DOSE.    KEEP SPECIALTY APPOINTMENTS.    CALL ENT DR TELLY BENJAMIN: FOR EAR PAIN RELIEF.    CALL  NEUROLOGIST DR LOGAN ORTEGA [phone] EVAL AND TREAT CHRONIC PERSISTENT MIGRAINES   KEEP [facility] ON[facility].

## 2025-05-01 NOTE — PROGRESS NOTES
"Subjective   Patient ID: Isaias Chamorro is a 44 y.o. female who presents for Earache (Bilateral ear pain ) and Migraine (For about 2 months ).    Earache     Migraine   Associated symptoms include ear pain.      Earache (Bilateral ear pain ) and Migraine (For about 2 months  10 DAYS OF EAR PAIN AND TUGGING AT THEM   AVOIDING Q-TIPS.    ? SWEET OIL USE?  NO   APPLYING ICE TO EAR FOR COMFORT AND OTC.    WARM PACKS ALSO.    SUDAFED SEEM TO HEALTH.    ANY ENT CARE:  SUCTIONED IN PAST.      INTERVAL GI CARE AND LINZESS WAS INCREASED AND EGD AND COLONOSCOPY IN 1 MONTH 01 JUNE 2025.      REASSURING MAMMO.        CHART NOTES:  3/28/25: FOR CONSTIPATION:   1--I SENT THE LINZESS 72 MG FOR 90 DAYS.  CONFIRM DOSE WITH GI SPECIALIST IN APRIL 2025.    2--HYDRATE, INCREASE FIBER AND CAN ALSO ATTACK FROM BELOW: AFTER MEALS TRY WIPING BRISKLY or A LUBRICATED FINGER INTO ANUS, OR A GLYCERINE SUPPOSITORY or FLEETS ENEMA IN THAT ORDER UNTIL BM IS ACHIEVED.      THYMUS FOR HODGKINS LYMPHOMA NOT THYROIDECTOMY.  1/31/25 PGS    Review of Systems   HENT:  Positive for ear pain.        Objective   /69   Pulse 92   Temp 36.5 °C (97.7 °F) (Temporal)   Resp 16   Ht 1.473 m (4' 10\")   Wt 88 kg (194 lb)   BMI 40.55 kg/m²     Physical Exam  Vitals and nursing note reviewed.   Constitutional:       Appearance: Normal appearance.      Comments: OBESE PALE COMPOSED WF GOOD HUMOR AND HXIAN.  GLASSES AND FAIR TEETH.  DEBRIS IN RT EAC AND OK LEFT TM.  TENDERNESS AT BRIDGE OF NOSE ? SPHENOID SINUSITS.  SAMPLED NURTEC DURING VISIT FOR EFFECT AND OBSERVED.  SUPPLE NECK NO LAD NOTED.  CLEAR CHST CVS EXAMS SOFT OBES ABD TIGHT WAIST BAND CONSTRICTS ABD.  NO DEP EDEMA.     HENT:      Head: Normocephalic.      Right Ear: Tympanic membrane, ear canal and external ear normal.      Left Ear: Tympanic membrane, ear canal and external ear normal.      Nose: Nose normal.      Mouth/Throat:      Mouth: Mucous membranes are moist.      Pharynx: " Oropharynx is clear.   Eyes:      Conjunctiva/sclera: Conjunctivae normal.      Pupils: Pupils are equal, round, and reactive to light.   Cardiovascular:      Rate and Rhythm: Normal rate and regular rhythm.      Pulses: Normal pulses.      Heart sounds: Normal heart sounds.   Pulmonary:      Effort: Pulmonary effort is normal.      Breath sounds: Normal breath sounds.   Abdominal:      General: Bowel sounds are normal.      Palpations: Abdomen is soft.   Musculoskeletal:         General: Normal range of motion.      Cervical back: Normal range of motion and neck supple.   Skin:     General: Skin is warm and dry.   Neurological:      General: No focal deficit present.      Mental Status: Mental status is at baseline.   Psychiatric:         Mood and Affect: Mood normal.         Behavior: Behavior normal.         Thought Content: Thought content normal.         Judgment: Judgment normal.     LESS WINCING AFTER THE NURTEC IN OFFICE SAMPLE TRIAL RESULT  GOOD.      Assessment/Plan   Assessment & Plan  Hodgkin lymphoma, unspecified Hodgkin lymphoma type, unspecified body region (Multi)         Chronic constipation         Acquired hypothyroidism         Essential hypertension         Left ear pain    Orders:    Referral to ENT; Future    Follow Up In Primary Care - Established; Future    doxycycline (Vibramycin) 100 mg capsule; Take 1 capsule (100 mg) by mouth 2 times a day for 14 days. Take with at least 8 ounces (large glass) of water, do not lie down for 30 minutes after    Chronic migraine w/o aura w/o status migrainosus, not intractable    Orders:    Referral to Neurology; Future    rimegepant (NURTEC) 75 mg tablet,disintegrating; Dissolve 1 tablet (75 mg) in the mouth every other day.    ubrogepant (Ubrelvy) 100 mg tablet; Take 1 tablet (100 mg) by mouth 1 time for 1 dose.    Encounter for general adult medical examination with abnormal findings    Orders:    Follow Up In Primary Care - Health Maintenance;  Future    Status post bariatric surgery         Screening for diabetes mellitus (DM)    Orders:    Hemoglobin A1c; Future

## 2025-05-07 NOTE — PROGRESS NOTES
Patient ID:  Isaias Chamorro is a 44 y.o. female.  Referring Physician:   Gilma Donohue, APRN-CNP  [id number] [place] [address]  Primary Care Provider:  Laureano Souza MD    Oncology History Overview Note   Hodgkin Lymphoma Diagnosis:  - stage IIB HL, dx 6/2013     ABVD:  - s/p ABVD x6 cycles with persistent disease noted in November 2013     BR:  - Enrolled on clinical trial [id number] with bendamustine and rituximab for 2 cycles  - PET-CT in March 2014 noted a CR     aSCT:  - s/p stem cell mobilization and collection:  collected 3.55 x10(6) CD34 cells per kg.   - s/p aSCT with BEAM preparative regimen  on April 15, 2014. Hospitalization complicated by neutropenic fever, pneumonia, menstrual bleeding, abdominal pain, nausea, vomiting, diarrhea, transaminitis secondary to medications, and electrolyte abnormalities.      Maintenance:  - s/p maintenance Brentuximab per clinical  research trial [id number], given on day 1 of each 21 day cycle.    Completed 16 cycles with CR by PET.     Relapse:  - She had imaging in July 2015 that suggested a possible recurrence. This was histologically confirmed by resection of a mediastinal mass by Dr. Oreilly.      Radiation:  - She began radiation therapy on 9/3/15 which completed on 10/6/15     Relapse:  - Admitted to [facility] (2/26/21) for weakness in left upper extremity.  MRI (2/23/21) demonstrated a tumor in the left paraspinous region and brachial plexus partially filling the C7-T1 neural foramen  (no cord compression), and enlarged left supraclavicular nodes. Ortho/Spine consulted on admit, no surgical intervention needed. PET scan (3/1/21) demonstrated left paraspinal mass, left supraclavicular node, several cervical LN. Patient underwent a core  biopsy x 3 of the left supraclavicular node, confirmed Classical Hodgkin Lymphoma relapse. Patient was started on Prednisone 50mg daily      Pembro:  - Salvage pembrolizumab given 3/23, 4/13, 5/4, 5/25, 6/18,  7/9/2021  - EOT PET showed Deauville 1 response.  Discussed in tumor board on 8/19/21, reviewed with rad onc; appears that her relapse overlapped some of her prior radiation ports.  Recommended consideration of consolidative radiation.     Radiation:  - 19 fractions planned to recurrence site started in early Oct, 2021 (10/19/21-11/11/21)  - EOT PET 11/29/21 - deauville 1     Maintenance:  - Pembro q3w beginning 2/11/22.  Additional doses 3/4/22, 3/25/22, 5/6/22, 5/27/22, 6/17/22, 7/8/22.  - C/b interstitial pneumonitis (8/2022) requiring O2 supplementation, extended prednisone taper     Other PMH:  - History of protein S deficiency and splenic infarct in 2011 with history of anticoagulation prior to transplant. It has been determined that this is likely due to an acute illness and inflammation at the time of her protein S deficiency diagnosis and  she currently does not require anticoagulation.  - History of monoclonal IgG lambda gammopathy.  - Hypothyroidism.  - Hypertension.  - Depression and bipolar disorder.  - Restless leg syndrome.  - RUE celulitis d/t infiltrated IV during Brentuximab infusion (hospitalized 12/24 -12/30/14) & treated with Clindamycin  - COVID 4/2022  - Uvular swelling spring of 2022, s/p course of steroids from ENT without significant improvement; held pembro after 7/8/22        Hodgkins lymphoma (Multi)   9/17/2015 Initial Diagnosis    Hodgkins lymphoma (Multi)          Assessment/Plan      5/9/25:  HL with recurrence s/p aSCT 4/15/14, pembro for post-transplant relapse (c/b pneumonitis). Recommend Mediport removal; she is agreeable if she can verify JANELLE status prior.      HL  - No active tx or maintenance currently  - CT c/a/p to confirm JANELLE status prior to Mediport removal  - Remove Mediport if no evidence of active lymphoma    Post-aSCT  - Completed post-transplant vaccines  - Last PFTs 10/2/23 (after pembro pneumonitis)    Headaches/facial pain  - Following with ENT  - Sees neurology  next week  - Consider imaging pending d/w neurology    Health maintenance  Follows with pulmonary, supp onc, gynecology, PCP, psychiatry/psychology..     Mediport  - Cont flushes q8w until removal      Subjective    History of Present Illness:  Isaias presents to the clinic today for routine follow up evaluation.     Over the last few months, she has developed migraines with facial pain, and has been unable to breathe when lying flat for the last few years (sleeps in a chair).  Was evaluated by ENT and was dx with allergies and enlarged nasal turbinates.  Planning sinus surgery with Dr. Lee ([facility]).      Denies B symptoms: fever, worsening fatigue, LA, drenching night sweats, rash/pruritus.            Review of Systems   Constitutional: Negative.    HENT:           Sinus/facial pain, allergies   Eyes: Negative.    Respiratory: Negative.     Cardiovascular: Negative.    Gastrointestinal: Negative.    Endocrine: Negative.    Genitourinary: Negative.     Musculoskeletal: Negative.    Skin: Negative.    Neurological:  Positive for headaches.   Hematological: Negative.    Psychiatric/Behavioral:  The patient is nervous/anxious.        Other PMH  History of protein S deficiency and splenic infarct in 2011 with history of anticoagulation prior to transplant. It has been determined that this is likely due to an acute illness and inflammation at the time of her protein S deficiency diagnosis and  she currently does not require anticoagulation.  History of monoclonal IgG lambda gammopathy.  Hypothyroidism.  Hypertension.  Depression and bipolar disorder.  Restless leg syndrome.  RUE celulitis d/t infiltrated IV during Brentuximab infusion (hospitalized 12/24 -12/30/14) & treated with Clindamycin  COVID 4/2022  Uvular swelling spring of 2022, s/p course of steroids from ENT without significant improvement; held pembro after 7/8/22  Fibromyalgia     Objective      There were no vitals taken for this  visit.    Physical Exam  Vitals reviewed.   Constitutional:       Appearance: Normal appearance.      Comments: Well appearing.    HENT:      Head: Normocephalic and atraumatic.      Nose: Nose normal.      Mouth/Throat:      Mouth: Mucous membranes are moist.   Eyes:      Pupils: Pupils are equal, round, and reactive to light.   Cardiovascular:      Rate and Rhythm: Normal rate and regular rhythm.      Pulses: Normal pulses.      Heart sounds: Normal heart sounds.   Pulmonary:      Effort: Pulmonary effort is normal.      Breath sounds: Normal breath sounds.   Abdominal:      General: Abdomen is flat. Bowel sounds are normal.      Palpations: Abdomen is soft.   Musculoskeletal:         General: Normal range of motion.   Lymphadenopathy:      Comments: No palpable cervical, supraclavicular, or axillary LA palpable.   Skin:     General: Skin is warm and dry.   Neurological:      General: No focal deficit present.      Mental Status: She is alert and oriented to person, place, and time.   Psychiatric:         Mood and Affect: Mood normal.       Results from last 7 days   Lab Units 05/09/25  1020   WBC AUTO x10*3/uL 5.5   HEMOGLOBIN g/dL 12.7   HEMATOCRIT % 38.4   PLATELETS AUTO x10*3/uL 184   NEUTROS PCT AUTO % 56.8   LYMPHS PCT AUTO % 31.9   MONOS PCT AUTO % 8.2   EOS PCT AUTO % 2.2       Results from last 7 days   Lab Units 05/09/25  1020   SODIUM mmol/L 136   POTASSIUM mmol/L 3.9   CHLORIDE mmol/L 106   CO2 mmol/L 23   BUN mg/dL 24*   CREATININE mg/dL 0.69   CALCIUM mg/dL 8.7   PROTEIN TOTAL g/dL 6.9   BILIRUBIN TOTAL mg/dL 0.4   ALK PHOS U/L 80   ALT U/L 11   AST U/L 15   GLUCOSE mg/dL 84

## 2025-05-09 ENCOUNTER — OFFICE VISIT (OUTPATIENT)
Dept: HEMATOLOGY/ONCOLOGY | Facility: HOSPITAL | Age: 45
End: 2025-05-09
Payer: COMMERCIAL

## 2025-05-09 ENCOUNTER — LAB (OUTPATIENT)
Dept: HEMATOLOGY/ONCOLOGY | Facility: HOSPITAL | Age: 45
End: 2025-05-09
Payer: COMMERCIAL

## 2025-05-09 ENCOUNTER — OFFICE VISIT (OUTPATIENT)
Dept: PALLIATIVE MEDICINE | Facility: HOSPITAL | Age: 45
End: 2025-05-09
Payer: COMMERCIAL

## 2025-05-09 VITALS
HEART RATE: 93 BPM | DIASTOLIC BLOOD PRESSURE: 65 MMHG | TEMPERATURE: 97.3 F | SYSTOLIC BLOOD PRESSURE: 109 MMHG | BODY MASS INDEX: 39.67 KG/M2 | RESPIRATION RATE: 18 BRPM | OXYGEN SATURATION: 99 % | WEIGHT: 189.82 LBS

## 2025-05-09 DIAGNOSIS — G89.3 CANCER RELATED PAIN: ICD-10-CM

## 2025-05-09 DIAGNOSIS — C81.90 HODGKIN LYMPHOMA, UNSPECIFIED HODGKIN LYMPHOMA TYPE, UNSPECIFIED BODY REGION (MULTI): ICD-10-CM

## 2025-05-09 DIAGNOSIS — Z79.891 ENCOUNTER FOR MONITORING OPIOID MAINTENANCE THERAPY: Primary | ICD-10-CM

## 2025-05-09 DIAGNOSIS — C81.70 OTHER CLASSICAL HODGKIN LYMPHOMA, UNSPECIFIED BODY REGION (MULTI): ICD-10-CM

## 2025-05-09 DIAGNOSIS — F32.A ANXIETY AND DEPRESSION: ICD-10-CM

## 2025-05-09 DIAGNOSIS — G89.3 CHRONIC PAIN DUE TO NEOPLASM: ICD-10-CM

## 2025-05-09 DIAGNOSIS — F41.9 ANXIETY AND DEPRESSION: ICD-10-CM

## 2025-05-09 DIAGNOSIS — R53.0 NEOPLASTIC MALIGNANT RELATED FATIGUE: ICD-10-CM

## 2025-05-09 DIAGNOSIS — K59.03 CONSTIPATION DUE TO PAIN MEDICATION THERAPY: ICD-10-CM

## 2025-05-09 DIAGNOSIS — Z51.81 ENCOUNTER FOR MONITORING OPIOID MAINTENANCE THERAPY: Primary | ICD-10-CM

## 2025-05-09 DIAGNOSIS — C81.91 HODGKIN LYMPHOMA OF LYMPH NODES OF NECK, UNSPECIFIED HODGKIN LYMPHOMA TYPE (MULTI): ICD-10-CM

## 2025-05-09 PROBLEM — F11.10 OPIOID ABUSE: Status: RESOLVED | Noted: 2023-09-12 | Resolved: 2025-05-09

## 2025-05-09 LAB
ALBUMIN SERPL BCP-MCNC: 4.1 G/DL (ref 3.4–5)
ALP SERPL-CCNC: 80 U/L (ref 33–110)
ALT SERPL W P-5'-P-CCNC: 11 U/L (ref 7–45)
AMPHETAMINES UR QL SCN: ABNORMAL
ANION GAP SERPL CALC-SCNC: 11 MMOL/L (ref 10–20)
AST SERPL W P-5'-P-CCNC: 15 U/L (ref 9–39)
BARBITURATES UR QL SCN: ABNORMAL
BASOPHILS # BLD AUTO: 0.04 X10*3/UL (ref 0–0.1)
BASOPHILS NFR BLD AUTO: 0.7 %
BENZODIAZ UR QL SCN: ABNORMAL
BILIRUB SERPL-MCNC: 0.4 MG/DL (ref 0–1.2)
BUN SERPL-MCNC: 24 MG/DL (ref 6–23)
BZE UR QL SCN: ABNORMAL
CALCIUM SERPL-MCNC: 8.7 MG/DL (ref 8.6–10.3)
CANNABINOIDS UR QL SCN: ABNORMAL
CHLORIDE SERPL-SCNC: 106 MMOL/L (ref 98–107)
CO2 SERPL-SCNC: 23 MMOL/L (ref 21–32)
CREAT SERPL-MCNC: 0.69 MG/DL (ref 0.5–1.05)
EGFRCR SERPLBLD CKD-EPI 2021: >90 ML/MIN/1.73M*2
EOSINOPHIL # BLD AUTO: 0.12 X10*3/UL (ref 0–0.7)
EOSINOPHIL NFR BLD AUTO: 2.2 %
ERYTHROCYTE [DISTWIDTH] IN BLOOD BY AUTOMATED COUNT: 15.3 % (ref 11.5–14.5)
EST. AVERAGE GLUCOSE BLD GHB EST-MCNC: 100 MG/DL
FENTANYL+NORFENTANYL UR QL SCN: ABNORMAL
FERRITIN SERPL-MCNC: 22 NG/ML (ref 8–150)
FOLATE SERPL-MCNC: 8.8 NG/ML
GLUCOSE SERPL-MCNC: 84 MG/DL (ref 74–99)
HBA1C MFR BLD: 5.1 % (ref ?–5.7)
HCT VFR BLD AUTO: 38.4 % (ref 36–46)
HCV AB SER QL: NONREACTIVE
HGB BLD-MCNC: 12.7 G/DL (ref 12–16)
IMM GRANULOCYTES # BLD AUTO: 0.01 X10*3/UL (ref 0–0.7)
IMM GRANULOCYTES NFR BLD AUTO: 0.2 % (ref 0–0.9)
INR PPP: 1.1 (ref 0.9–1.1)
IRON SATN MFR SERPL: 16 % (ref 25–45)
IRON SERPL-MCNC: 66 UG/DL (ref 35–150)
LYMPHOCYTES # BLD AUTO: 1.76 X10*3/UL (ref 1.2–4.8)
LYMPHOCYTES NFR BLD AUTO: 31.9 %
MCH RBC QN AUTO: 28.3 PG (ref 26–34)
MCHC RBC AUTO-ENTMCNC: 33.1 G/DL (ref 32–36)
MCV RBC AUTO: 86 FL (ref 80–100)
METHADONE UR QL SCN: ABNORMAL
MONOCYTES # BLD AUTO: 0.45 X10*3/UL (ref 0.1–1)
MONOCYTES NFR BLD AUTO: 8.2 %
NEUTROPHILS # BLD AUTO: 3.13 X10*3/UL (ref 1.2–7.7)
NEUTROPHILS NFR BLD AUTO: 56.8 %
NRBC BLD-RTO: 0 /100 WBCS (ref 0–0)
OPIATES UR QL SCN: ABNORMAL
OXYCODONE+OXYMORPHONE UR QL SCN: ABNORMAL
PCP UR QL SCN: ABNORMAL
PLATELET # BLD AUTO: 184 X10*3/UL (ref 150–450)
POTASSIUM SERPL-SCNC: 3.9 MMOL/L (ref 3.5–5.3)
PROT SERPL-MCNC: 6.9 G/DL (ref 6.4–8.2)
PROTHROMBIN TIME: 11.7 SECONDS (ref 9.8–12.4)
RBC # BLD AUTO: 4.49 X10*6/UL (ref 4–5.2)
SODIUM SERPL-SCNC: 136 MMOL/L (ref 136–145)
TIBC SERPL-MCNC: 409 UG/DL (ref 240–445)
UIBC SERPL-MCNC: 343 UG/DL (ref 110–370)
WBC # BLD AUTO: 5.5 X10*3/UL (ref 4.4–11.3)

## 2025-05-09 PROCEDURE — 83540 ASSAY OF IRON: CPT | Performed by: INTERNAL MEDICINE

## 2025-05-09 PROCEDURE — 80053 COMPREHEN METABOLIC PANEL: CPT

## 2025-05-09 PROCEDURE — 82728 ASSAY OF FERRITIN: CPT | Performed by: INTERNAL MEDICINE

## 2025-05-09 PROCEDURE — 36591 DRAW BLOOD OFF VENOUS DEVICE: CPT

## 2025-05-09 PROCEDURE — 85025 COMPLETE CBC W/AUTO DIFF WBC: CPT

## 2025-05-09 PROCEDURE — 1036F TOBACCO NON-USER: CPT | Performed by: NURSE PRACTITIONER

## 2025-05-09 PROCEDURE — 99215 OFFICE O/P EST HI 40 MIN: CPT | Performed by: STUDENT IN AN ORGANIZED HEALTH CARE EDUCATION/TRAINING PROGRAM

## 2025-05-09 PROCEDURE — 86803 HEPATITIS C AB TEST: CPT | Performed by: FAMILY MEDICINE

## 2025-05-09 PROCEDURE — 84425 ASSAY OF VITAMIN B-1: CPT | Performed by: INTERNAL MEDICINE

## 2025-05-09 PROCEDURE — 1036F TOBACCO NON-USER: CPT | Performed by: STUDENT IN AN ORGANIZED HEALTH CARE EDUCATION/TRAINING PROGRAM

## 2025-05-09 PROCEDURE — 85610 PROTHROMBIN TIME: CPT | Performed by: INTERNAL MEDICINE

## 2025-05-09 PROCEDURE — 99214 OFFICE O/P EST MOD 30 MIN: CPT | Performed by: NURSE PRACTITIONER

## 2025-05-09 PROCEDURE — 2500000004 HC RX 250 GENERAL PHARMACY W/ HCPCS (ALT 636 FOR OP/ED): Mod: JZ | Performed by: NURSE PRACTITIONER

## 2025-05-09 PROCEDURE — 83036 HEMOGLOBIN GLYCOSYLATED A1C: CPT | Performed by: FAMILY MEDICINE

## 2025-05-09 PROCEDURE — 84590 ASSAY OF VITAMIN A: CPT | Performed by: INTERNAL MEDICINE

## 2025-05-09 PROCEDURE — 80307 DRUG TEST PRSMV CHEM ANLYZR: CPT | Performed by: NURSE PRACTITIONER

## 2025-05-09 PROCEDURE — 82746 ASSAY OF FOLIC ACID SERUM: CPT | Performed by: FAMILY MEDICINE

## 2025-05-09 RX ORDER — HEPARIN 100 UNIT/ML
500 SYRINGE INTRAVENOUS AS NEEDED
OUTPATIENT
Start: 2025-05-09

## 2025-05-09 RX ORDER — HEPARIN 100 UNIT/ML
500 SYRINGE INTRAVENOUS AS NEEDED
Status: DISCONTINUED | OUTPATIENT
Start: 2025-05-09 | End: 2025-05-09 | Stop reason: HOSPADM

## 2025-05-09 RX ORDER — HEPARIN SODIUM,PORCINE/PF 10 UNIT/ML
50 SYRINGE (ML) INTRAVENOUS AS NEEDED
OUTPATIENT
Start: 2025-05-09

## 2025-05-09 RX ORDER — METHADONE HYDROCHLORIDE 5 MG/1
TABLET ORAL
Qty: 60 TABLET | Refills: 0 | Status: SHIPPED | OUTPATIENT
Start: 2025-05-09 | End: 2025-06-08

## 2025-05-09 RX ORDER — CYCLOBENZAPRINE HCL 5 MG
5 TABLET ORAL NIGHTLY PRN
COMMUNITY

## 2025-05-09 RX ORDER — OXYCODONE HYDROCHLORIDE 5 MG/1
5 TABLET ORAL EVERY 12 HOURS PRN
Qty: 60 TABLET | Refills: 0 | Status: SHIPPED | OUTPATIENT
Start: 2025-05-09 | End: 2025-06-08

## 2025-05-09 RX ADMIN — HEPARIN 500 UNITS: 100 SYRINGE at 10:21

## 2025-05-09 ASSESSMENT — PAIN SCALES - GENERAL: PAINLEVEL_OUTOF10: 5

## 2025-05-09 ASSESSMENT — ENCOUNTER SYMPTOMS
CARDIOVASCULAR NEGATIVE: 1
RESPIRATORY NEGATIVE: 1
MUSCULOSKELETAL NEGATIVE: 1
EYES NEGATIVE: 1
ENDOCRINE NEGATIVE: 1
HEADACHES: 1
GASTROINTESTINAL NEGATIVE: 1
CONSTITUTIONAL NEGATIVE: 1
NERVOUS/ANXIOUS: 1
HEMATOLOGIC/LYMPHATIC NEGATIVE: 1

## 2025-05-09 NOTE — PROGRESS NOTES
[facility] OUTPATIENT FOLLOW-UP    SERVICE DATE: 11/20/2024    Subjective   HISTORY OF PRESENT ILLNESS: Isaias Chamorro is a 44 y.o. female who presents with past medical history of Hodgkin's Lymphoma, originally diagnosed June 2013, with recent relapse in March of 2021.  She received RT therapy to paraspinal mass, was pursuing Pembrolizumab but developed pneumonitis. Currently on surveillance.   Patient follows with [facility] for pain and further symptom management.       2/14/25: Pain stable. Having issues with fatigue. Sleeping 16-18hrs per day. This started 2 months ago. PCP is working this up.     5/9/25: Has upcoming sinus surgery 8/4. Seeing neurology for migraines. Fatigue is better overall.     Pain Assessment:  Mild  Neck / left shoulder / radiating to arm to elbow     Symptom Assessment:  Pain:a little   Numbness or Tingling in hands/feet/other: none  Sore Muscles/Spasms: none  Headache: a little   Dizziness:none  Constipation: a little   Diarrhea: none  Nausea: none  Vomiting: none  Lack of Appetite: none   Weight Loss: a little - intentional   Lack of Energy: a little   Difficulty Sleeping: a little  Worrying: none  Anxiety: a little  Depression: a little  Shortness of breath: none  Other: none      Information obtained from: chart review and interview of patient  ______________________________________________________________________        Objective     CREATININE   Date Value Ref Range Status   02/06/2025 0.65 0.50 - 0.99 mg/dL Final     AST   Date Value Ref Range Status   02/06/2025 17 10 - 30 U/L Final     ALT   Date Value Ref Range Status   02/06/2025 14 6 - 29 U/L Final     HEMOGLOBIN   Date Value Ref Range Status   02/06/2025 12.0 11.7 - 15.5 g/dL Final     PLATELET COUNT   Date Value Ref Range Status   02/06/2025 186 140 - 400 Thousand/uL Final       PHYSICAL EXAMINATION   Vital Signs:       1/31/2025     2:24 PM 2/10/2025     1:26 PM 2/14/2025     "12:49 PM 3/7/2025     1:06 PM 4/15/2025    12:46 PM 5/1/2025     9:18 AM 5/9/2025    10:43 AM   Vitals   Systolic 125 124 112 107  100 109   Diastolic 84 80 72 74  69 65   BP Location Left arm  Left arm    Left arm   Heart Rate 73  94 88  92 93   Temp 36.4 °C (97.6 °F)  36.8 °C (98.2 °F) 36.1 °C (97 °F)  36.5 °C (97.7 °F) 36.3 °C (97.3 °F)   Resp 16   16  16 18   Height  1.524 m (5')  1.492 m (4' 10.74\") 1.473 m (4' 10\") 1.473 m (4' 10\")    Weight (lb) 219 224 217 203.15 203 194 189.82   BMI 44.99 kg/m2 43.75 kg/m2 42.38 kg/m2 41.4 kg/m2 42.43 kg/m2 40.55 kg/m2 39.67 kg/m2   BSA (m2) 2.02 m2 2.08 m2 2.04 m2 1.95 m2 1.94 m2 1.9 m2 1.88 m2   Visit Report Report Report Report  Report Report Report    Report             Physical Exam  Vitals reviewed.   Constitutional:       Appearance: Normal appearance.   HENT:      Head: Normocephalic.   Cardiovascular:      Rate and Rhythm: Normal rate and regular rhythm.   Pulmonary:      Effort: Pulmonary effort is normal.   Abdominal:      General: Abdomen is flat.   Musculoskeletal:         General: Normal range of motion.   Skin:     General: Skin is warm and dry.   Neurological:      General: No focal deficit present.      Mental Status: She is alert. Mental status is at baseline.   Psychiatric:         Mood and Affect: Mood normal.         Behavior: Behavior normal.         Thought Content: Thought content normal.         Judgment: Judgment normal.       ASSESSMENT/PLAN    Pain  Pain is: chronic - post cancer therapy  Type: somatic and neuropathic  Home regimen:   - Continue Methadone 2.5/2.5/5mg   - EKG (12/29/23) . EKG (11/20/2024) . Redo with each Methadone increase or yearly  - Continue Oxycodone 2.5-5mg [id number] PRN  - Continue Topiramate - takes 300mg at bedtime   - Following with Dr Beebe - planning to schedule follow-up for acupuuncture for headaches - also may assist with pain and anxiety  - FU neurology   - Pursuing massage therapy/stretching  - Continue " Aquatic Therapy/PT    Intolerances/previously tried:   - Unable to take NSAIDs d/t bariatric surgery and Abreu's Esophagus  - Gabapentin - did not tolerate - excessive weight gain  - Pregabalin - did not tolerate  - Duloxetine - did not tolerate in past  - D/C Nortriptyline 10mg once daily at bed due to concerns of serotonin syndrome      Opioid Use  Medication Management:   - OARRS report reviewed with no aberrant behavior; consistent with  prescriptions/records and patient history  - MED 10.  Overdose Risk Score 490.   This has been discussed with patient.   - We will continue to closely monitor the patient for signs of prescription misuse including UDS, OARRS review and subjective reports at each visit.  - Concurrent benzodiazepine use with lorazepam, educated on risks.  - I am a provider who either is or has consulted and collaborated with a provider certified in Hospice and Palliative Medicine and have conducted a face-face visit and examination for this patient.  - Routine Urine Drug Screen: 5/9/25 and pending   - Controlled Substance Agreement: 5/9/25  - Specifically discussed that controlled substance prescriptions will only be provided by our group as outlined in the completed agreement  - Prescribed naloxone: 5/6/22  - Red Flags: none     Constipation (Chronic in nature)  At risk for constipation related to opioids.  Home regimen:   - Following with Gastro  - Continue Senna-S PRN and fiber gummy prn   - Continue linzess 145mcg daily      Nausea (improved)  - Continue Ondansetron 8mg [id number] PRN  - Continue Lorazepam 0.5mg, 1/2-1 tab q8 hours PRN     Altered Mood  Chronic anxiety and depression related to hx of bipolar disorder .  Home regimen:   - Managed by outside provider - appreciate recs  - Following with trauma counselor  - Pursuing acupuncture/massage  - Continue Lamictal 100mg daily  - Continue Sertraline 100mg daily  - Continue Lorazepam 0.5mg, 1/2 to 1 tablet [id number] PRN for  anxiety    Fatigue   - improving     Next Follow-Up Visit:  Return to clinic in 3 months     Signature and billing  Medical complexity was moderate level due to due to complexity of problems, extensive data review, and high risk of management/treatment.  Time was spent on the following: Prep Time, Time Directly with Patient/Family/Caregiver, Documentation Time. Total time spent: 30      Data  Diagnostic tests and information reviewed for today's visit:  Most recent labs and imaging results, Medications     Some elements copied from Palliative Care note on 2/14/25, the elements have been updated and all reflect current decision making from today, 5/9/25.    Plan of Care discussed with: Patient and RN    SIGNATURE: Radha Quan, APRN-CNP    Contact information:  [facility]  Monday-Friday 8 AM-5 PM  Phone:  [phone], press option #5, then option #1.   Or Epic Secure Chat

## 2025-05-12 ENCOUNTER — TELEPHONE (OUTPATIENT)
Dept: PRIMARY CARE | Facility: CLINIC | Age: 45
End: 2025-05-12
Payer: COMMERCIAL

## 2025-05-13 ENCOUNTER — OFFICE VISIT (OUTPATIENT)
Dept: NEUROLOGY | Facility: HOSPITAL | Age: 45
End: 2025-05-13
Payer: COMMERCIAL

## 2025-05-13 VITALS — DIASTOLIC BLOOD PRESSURE: 71 MMHG | SYSTOLIC BLOOD PRESSURE: 102 MMHG | HEART RATE: 91 BPM

## 2025-05-13 DIAGNOSIS — G43.709 CHRONIC MIGRAINE W/O AURA W/O STATUS MIGRAINOSUS, NOT INTRACTABLE: ICD-10-CM

## 2025-05-13 DIAGNOSIS — R20.0 FACIAL NUMBNESS: Primary | ICD-10-CM

## 2025-05-13 DIAGNOSIS — M54.2 NECK PAIN: ICD-10-CM

## 2025-05-13 LAB
6MAM UR CFM-MCNC: <25 NG/ML
ANNOTATION COMMENT IMP: NORMAL
CODEINE UR CFM-MCNC: <50 NG/ML
EDDP UR CFM-MCNC: >1000 NG/ML
HYDROCODONE CTO UR CFM-MCNC: <25 NG/ML
HYDROMORPHONE UR CFM-MCNC: <25 NG/ML
METHADONE UR CFM-MCNC: >1000 NG/ML
MORPHINE UR CFM-MCNC: <50 NG/ML
NORHYDROCODONE UR CFM-MCNC: <25 NG/ML
NOROXYCODONE UR CFM-MCNC: 175 NG/ML
OXYCODONE UR CFM-MCNC: <25 NG/ML
OXYMORPHONE UR CFM-MCNC: 61 NG/ML
RETINYL PALMITATE SERPL-MCNC: <0.02 MG/L (ref 0–0.1)
VIT A SERPL-MCNC: 0.6 MG/L (ref 0.3–1.2)
VIT B1 PYROPHOSHATE BLD-SCNC: 86 NMOL/L (ref 70–180)

## 2025-05-13 PROCEDURE — 99215 OFFICE O/P EST HI 40 MIN: CPT | Performed by: PSYCHIATRY & NEUROLOGY

## 2025-05-13 PROCEDURE — G2211 COMPLEX E/M VISIT ADD ON: HCPCS | Performed by: PSYCHIATRY & NEUROLOGY

## 2025-05-13 PROCEDURE — 1036F TOBACCO NON-USER: CPT | Performed by: PSYCHIATRY & NEUROLOGY

## 2025-05-13 PROCEDURE — 99205 OFFICE O/P NEW HI 60 MIN: CPT | Performed by: PSYCHIATRY & NEUROLOGY

## 2025-05-13 ASSESSMENT — ENCOUNTER SYMPTOMS
LOSS OF SENSATION IN FEET: 0
DEPRESSION: 0
OCCASIONAL FEELINGS OF UNSTEADINESS: 0

## 2025-05-13 ASSESSMENT — PAIN SCALES - GENERAL: PAINLEVEL_OUTOF10: 4

## 2025-05-13 NOTE — PROGRESS NOTES
In-clinic visit    Visit type: provider referral: Dr Souza    PCP: Laureano Souza MD.    Subjective     Isaias Chamorro is a 44 y.o. year old right handed female who presents with Migraine (Ref by Dr Ling).    Patient is accompanied by none.     HPI    States here having 2-fold problem. 1 is   having left face pain/numbness, thought to be possibly related to allergies and sinuses. Also feel there's migraine component.    Describes pain/numb that goes up left side of head/forehead/jaw/cheek/tip of nose. 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 States above symptoms seemed to have started with this. Facial pain progressively getting worse.    No throbbing. (+) light/sound sensitivity. (-) n/v.    Also has pain in back of neck >1 year.    HA 3-4x/week  Back of neck pain/pressure constant    No recent neuro consult. Saw neuro when much younger for RLS etc.    S/p GBP--wt gain  S/p amitriptyline--wt gain    Nurtec started 2 weeks ago by PCP, seemed to have lessened the pain some.    ENT wants to do turbinate resection, and was telling her likely to help with some of the facial symptoms she is having.    (+) asthma. No kidney stone. No heart problem. No glaucoma.    (+) eye check recently, no issues contributing to HA per pt.    No brain scan.    On LTG 100mg daily, methadone (1 year, prior was on fentanyl)--for chronic pain (related to tumor), oxycodone prn  On Nurtec every other day--just started 2 weeks ago  On topiramate 300mg at bedtime--for nerve pain related to brachial plexus  Was given samples for Ubrelvy--never took it, never had to    No smoking. No alcohol. No street drug use.      Review of Systems   Constitutional:  Negative for appetite change, chills and fever.   HENT:  Negative for ear pain and nosebleeds.    Eyes:  Negative for discharge and itching.   Respiratory:  Negative for choking and chest tightness.    Cardiovascular:  Negative for chest pain and palpitations.    Gastrointestinal:  Negative for abdominal distention, abdominal pain and nausea.   Endocrine: Negative for cold intolerance and heat intolerance.   Genitourinary:  Negative for difficulty urinating and dysuria.   Musculoskeletal:  Negative for gait problem and myalgias.   Neurological:  Negative for dizziness, seizures.     Problem List[1]    Medical History[2]  Surgical History[3]  Social History     Tobacco Use    Smoking status: Never     Passive exposure: Never    Smokeless tobacco: Never   Substance Use Topics    Alcohol use: Not Currently     family history includes Aortic aneurysm in her father and another family member; Abreu's esophagus in her mother; COPD in her father and mother; Clotting disorder in her father; Coronary artery disease in her father; Depression in her brother, father, and mother; Diabetes in her maternal grandmother, mother's sister, and other family members; Fibromyalgia in her mother; Graves' disease in her mother's sister; Hyperlipidemia in her father and mother; Hypertension in her father; Hypothyroidism in her maternal grandmother; Idiopathic PAH (pulmonary arterial hypertension) in her maternal grandmother and another family member; Kidney cancer in her mother's brother, mother's sister, and another family member; Lung cancer in her father and paternal grandmother; Melanoma in her parent; Mental illness in an other family member; Neuropathy in her maternal grandmother and another family member; Pulmonary fibrosis in her maternal grandmother and mother's sister; Rheum arthritis in her mother's sister and another family member; Thyroid disease in her maternal grandmother.    Allergies[4]  Current Medications[5]    Objective     /71   Pulse 91     Awake, alert, oriented x3, in no distress  Well-nourished, ambulatory independently  No leg edema    (+) spasm bilat upper trapezius    Mental status exam as above, conversant   Fair fund of knowledge  Recent/remote memory  fair  Fair attention span  Pupils round reactive to light, 3-4 mm, (-) RAPD   Fundoscopic examination was attempted but fundus was not visualized bilaterally   Full EOMs intact, no nystagmus, no ptosis   V1 to V3 sensation is intact   No facial droop   Hearing grossly intact   No dysarthria  Good shoulder shrug bilaterally   Tongue is midline     Motor strength is 5/5 on all extremities, tone/bulk normal   Reflexes 1+ on all 4 extremities except absent on B ankles, downgoing toes bilaterally   Sensation is intact to light touch, vibration on all 4 extremities   Finger to nose test intact bilaterally   Negative Romberg sign   Normal gait       Lab Results   Component Value Date    WBC 5.5 05/09/2025    RBC 4.49 05/09/2025    HGB 12.7 05/09/2025    HCT 38.4 05/09/2025     05/09/2025     05/09/2025    K 3.9 05/09/2025     05/09/2025    BUN 24 (H) 05/09/2025    CREATININE 0.69 05/09/2025    EGFR >90 05/09/2025    CALCIUM 8.7 05/09/2025    ALKPHOS 80 05/09/2025    AST 15 05/09/2025    ALT 11 05/09/2025    MG 2.13 08/05/2024    RJQVFLUU01 392 02/06/2025    VITD25 26 (L) 02/06/2025    HGBA1C 5.1 05/09/2025    LDLCALC 89 02/06/2025    CHOL 159 02/06/2025    HDL 50 02/06/2025    TRIG 106 02/06/2025    TSH 4.07 02/06/2025        MR CERVICAL SPINE W AND WO IV CONTRAST 02/23/2021    Narrative  MRN: [id number]  Patient Name: MO RIGGS    STUDY:  MR CERVICAL WO/W CONTRAST;  2/23/2021 9:20 am    INDICATION:  A 40-year-old female with history of Hodgkin's lymphoma; presents  with ongoing neck pain which radiates to left upper extremity; MRI of  the cervical spine is requested for further characterization.    COMPARISON:  CT of the neck dated 07/07/2014;    ACCESSION NUMBER(S):  [id number]    ORDERING CLINICIAN:  BILL MOHR    TECHNIQUE:  Sagittal T1, T2, STIR, axial T1 and axial T2 weighted images were  acquired through the cervical spine.  Additional T1 weighted images  were obtained after  intravenous administration of 18 mL of MultiHance  gadolinium base contrast.    FINDINGS:  The 1st rib and adjacent brachial plexus are partially encased by an  ill-defined approximately 2.5 cm diameter enhancing mass which  partially fills the left C7-T1 neural foramen extending to the  lateral recess. This extends inferiorly at least to the left T2  paraspinous region. The most inferior extent is not imaged.    There is an additional 2.3 cm lobulated mass/enlarged left  supraclavicular lymph node as well.    The T2 weighted images are at least moderately degraded by motion  artifact.    Alignment: There is straightening of the normal lordotic curvature of  the cervical spine, which may be due to position or muscle spasm.  There is 0.2 cm retrolisthesis of C4 on C5 and C5 on C6.    Vertebrae/Intervertebral Discs: The vertebral bodies demonstrate  expected height.  There are mild degenerative endplate signal changes  along the inferior plates of C3 and C4, otherwise the marrow signal  is within normal limits.    There is mild multilevel intervertebral disc desiccation, without  significant disc height loss.    Cervical spinal cord: There is suggestion of diffuse increased T2  signal within the ventral aspect of the cervical spinal cord, which  is likely artifactual. Otherwise, the cervical spinal cord  demonstrates normal size and morphology, without evidence of abnormal  enhancement.    C1-C2: The cervicomedullary junction appears unremarkable. There is  no spinal canal stenosis.    C2-C3: There is no posterior disc contour abnormality. There is no  measurable spinal canal or neural foraminal stenosis.    C3-C4: There is central posterior disc herniation which effaces the  ventral thecal sac and indents on the spinal cord, with mild spinal  canal narrowing. There is no measurable neural foraminal stenosis.    C4-C5: There is right paracentral osteophytic spurring, which effaces  the ventral thecal sac and indents  on the spinal cord, with mild  spinal canal narrowing. There is mild right-sided neural foraminal  due to the osteophytic spurring.    C5-C6: There is diffuse posterior disc bulging with mild osteophytic  spurring, which effaces the ventral thecal sac and contributes to  mild spinal canal narrowing. There is mild right-sided and moderate  left-sided neural foraminal narrowing due to bulging disc material.    C6-C7: There is minimal diffuse posterior disc bulging without  measurable spinal canal stenosis. There is no measurable neural  foraminal narrowing bilaterally.    C7-T1: There is no posterior disc contour abnormality. There is no  measurable spinal canal or neural foraminal stenosis.    The upper thoracic vertebrae and spinal canal are unremarkable.    The prevertebral and posterior paraspinous soft tissues are within  normal limits.    Impression  1. Tumor extends in the left paraspinous region and brachial plexus  partially filling the left C7-T1 neural foramen. This is associated  with an enlarged supraclavicular node/mass. PET-CT may be helpful for  further evaluation.    2. Moderate left-sided neural foraminal stenosis at C5-C6 due to  bulging disc material, with additional spondylitic changes of the  cervical spine as described above.    The [employer] system was used to transmit the results to Dr. Lambert.    I personally reviewed the images/study and Dr. Pillai's  interpretation and I agree with the findings as stated. This study  was analyzed and interpreted at [facility], [place], [place].      Assessment/Plan     L facial paresthesias  Symptoms apparently started with Hodgkin's around 2001 and has been present for some time, worsening recently  I am unsure pt does have component of migraine--though states Nurte every other day seems helping  Given lymphoma hx, will check MRI brain wwo to also eval for poss cranial nerve involvement  Pt already on topiramate  300mg at bedtime for pain as well    2.   Bilateral posterior neck/trapezius spasm/pain  Unclear if tumor has any contribution to this  Discussed, try neck exercises/massage therapy  Pt wants massage therapy referral thru [facility]    3. Chronic pain related to tumor/L brachial plexus issue  Seeing palliative oncology    Plans:  Do MRI brain wwo  Take care of neck/shoulder/trapezius--massages--[facility] referral  Continue topiramate/Nurtec every other day c/o prescriber    Rtc 2 mo    All questions were answered.  Pt knows how to contact my office in case pt has any questions or concerns.    Mele Sheehan MD              [1]   Patient Active Problem List  Diagnosis    Allergic rhinitis    Anxiety    Arthritis    ASCUS with positive high risk HPV cervical    Abreu esophagus    Bipolar affective disorder (Multi)    Cervical radiculopathy    Chronic cervical pain    Chronic constipation    CHRISTINA III (cervical intraepithelial neoplasia grade III) with severe dysplasia    Hirsutism    HLD (hyperlipidemia)    Hodgkins lymphoma (Multi)    Hypothyroidism    IUD (intrauterine device) in place    Mild intermittent asthma    PCOS (polycystic ovarian syndrome)    S/P bariatric surgery    Seasonal allergies    Vitamin D deficiency    Anxiety and depression    Chronic hypokalemia    Eczema    Hypertrophic scar    Asthma    Cough variant asthma (HHS-HCC)    Intestinal malabsorption    Intractable neuropathic pain of hand    Polyneuropathy    Splenic infarction    Status post bone marrow transplant (Multi)    Thoracic degenerative disc disease    Urinary, incontinence, stress female    Uvular swelling    Vocal cord dysfunction    Fibromyalgia    Anemia in other chronic diseases classified elsewhere    Mediastinal mass    Abnormal fasting glucose   [2]   Past Medical History:  Diagnosis Date    Bipolar disorder, currently in remission, most recent episode unspecified (Multi) 08/17/2016    History of depressed bipolar disorder     Bipolar disorder, unspecified (Multi) 04/04/2014    Bipolar affective    Dorsalgia, unspecified 02/26/2020    Mid back pain    Dysplasia of cervix uteri, unspecified 10/26/2017    Cervical intraepithelial neoplasia (CHRISTINA)    Encounter for follow-up examination after completed treatment for conditions other than malignant neoplasm 09/22/2022    Hospital discharge follow-up    Encounter for gynecological examination (general) (routine) without abnormal findings 09/01/2017    Well woman exam with routine gynecological exam    Gastro-esophageal reflux disease without esophagitis 08/31/2016    Esophageal reflux finding    Hx antineoplastic chemo June 2013    Hypothyroidism 2006?    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    Moderate cervical dysplasia 12/08/2017    CHRISTINA II (cervical intraepithelial neoplasia II)    Other conditions influencing health status 05/27/2020    Diabetes mellitus type 2, uncontrolled    Other conditions influencing health status 10/26/2017    Encounter for procedure    Other general symptoms and signs 04/11/2018    Flu-like symptoms    Other long term (current) drug therapy 03/04/2015    Long term use of drug    Other specified disorders of nose and nasal sinuses 12/01/2016    Sinus pressure    Other specified predominantly sexually transmitted diseases     Other specified predominantly sexually transmitted diseases    Otitis media, unspecified, right ear 11/21/2017    Otitis media of right ear    Pain in unspecified joint 05/08/2018    Chronic pain of multiple joints    Peripheral neuropathy 2003    Personal history of antineoplastic chemotherapy 04/02/2014    Personal history of chemotherapy    Personal history of Hodgkin lymphoma     History of Hodgkin's lymphoma    Personal history of irradiation July 2015 and Sept 2021    Personal history of other diseases of the circulatory system 12/19/2016     History of hypertension    Personal history of other diseases of the circulatory system 08/23/2017    History of hypertension    Personal history of other diseases of the digestive system 07/31/2017    History of Abreu's esophagus    Personal history of other diseases of the female genital tract 08/17/2016    History of polycystic ovarian syndrome    Personal history of other diseases of the musculoskeletal system and connective tissue 08/07/2020    History of neck pain    Personal history of other diseases of the respiratory system 12/01/2016    History of sore throat    Personal history of other diseases of the respiratory system 12/19/2016    History of influenza    Personal history of other diseases of the respiratory system 07/23/2021    History of acute sinusitis    Personal history of other diseases of the respiratory system 07/05/2016    History of acute bronchitis    Personal history of other diseases of the respiratory system 02/04/2021    History of acute sinusitis    Personal history of other diseases of the respiratory system 04/11/2018    History of acute bronchitis    Personal history of other endocrine, nutritional and metabolic disease 01/19/2016    History of hypothyroidism    Personal history of other endocrine, nutritional and metabolic disease 04/04/2014    History of hypothyroidism    Personal history of other medical treatment     History of screening mammography    Personal history of other specified conditions 10/29/2019    History of fatigue    Personal history of other specified conditions 07/19/2017    History of chronic cough    Personal history of other specified conditions 04/11/2018    History of wheezing    Personal history of other venous thrombosis and embolism     H/O blood clots    Personal history of pneumonia (recurrent) 06/06/2014    History of pneumonia    Prediabetes 11/01/2019    Pre-diabetes    Protein S deficiency (Multi)     Splenic infarct     Type 2 diabetes mellitus  2018    Vasomotor rhinitis 07/31/2017    Non-allergic vasomotor rhinitis    Vitamin D deficiency 2006   [3]   Past Surgical History:  Procedure Laterality Date    CT GUIDED IMAGING FOR NEEDLE PLACEMENT  05/31/2013    CT GUIDED IMAGING FOR NEEDLE PLACEMENT [facility]    FOOT SURGERY  01/19/2016    Foot Surgery    GASTRIC RESTRICTION SURGERY  2020    KNEE SURGERY  01/19/2016    Knee Surgery    MR HEAD ANGIO WO IV CONTRAST  03/02/2015    MR HEAD ANGIO WO IV CONTRAST 3/2/2015 [facility]    OTHER SURGICAL HISTORY  12/16/2020    Katy-en-Y gastric bypass    OTHER SURGICAL HISTORY  04/02/2014    Biopsy Bone Marrow    OTHER SURGICAL HISTORY  01/19/2016    Wrist Carpectomy    OTHER SURGICAL HISTORY  04/04/2014    Tunneled Central IV Removal With Port    OTHER SURGICAL HISTORY  04/04/2014    Thoracoscopy Of The Mediastinal Space    TONSILLECTOMY  2004    Tonsillectomy    WISDOM TOOTH EXTRACTION  1995   [4]   Allergies  Allergen Reactions    Cephalexin Anaphylaxis, Rash and Unknown    Keytruda [Pembrolizumab] Anaphylaxis    Penicillins Anaphylaxis, Rash and Unknown    Lactose Unknown    Latex Hives and Unknown    Sulfa (Sulfonamide Antibiotics) Other and Unknown    Sulfamethoxazole Unknown    Azithromycin Rash    Clarithromycin Rash and Unknown    Erythromycin Base Rash    Macrolide Antibiotics Rash   [5]   Current Outpatient Medications:     albuterol 90 mcg/actuation inhaler, Inhale 2 puffs every 4 hours if needed for wheezing., Disp: 18 g, Rfl: 11    biotin 10,000 mcg capsule, Take 1 capsule (10 mg) by mouth once daily., Disp: 90 capsule, Rfl: 3    calcium cit-mag-D3-Zn--maxine (Calcium Citrate Plus) 250 mg-40 mg- 125 unit-3.75mg tablet, Take 1 tablet by mouth once daily., Disp: 90 tablet, Rfl: 3    cholecalciferol (Vitamin D3) 5,000 Units tablet, Take 1 tablet (5,000 Units) by mouth once daily., Disp: 90 tablet, Rfl: 3    cyanocobalamin (Vitamin B-12) 1,000 mcg tablet, Take 1 tablet (1,000 mcg) by mouth  once daily., Disp: 90 tablet, Rfl: 3    cyclobenzaprine (Flexeril) 5 mg tablet, Take 1 tablet (5 mg) by mouth as needed at bedtime for muscle spasms., Disp: , Rfl:     estradiol (Vivelle-DOT) 0.0375 mg/24 hr, Place 1 patch over 96 hours on the skin 2 times a week., Disp: 8 patch, Rfl: 11    lamoTRIgine (LaMICtal) 100 mg tablet, Take 1 tablet (100 mg) by mouth once daily., Disp: , Rfl:     levonorgestrel (Mirena) 21 mcg/24 hours (8 yrs) 52 mg IUD, Intrauterine, Disp: , Rfl:     levothyroxine (Synthroid, Levoxyl) 200 mcg tablet, Take 1 tablet (200 mcg) by mouth once daily in the morning. Take before meals., Disp: 90 tablet, Rfl: 3    linaCLOtide (Linzess) 145 mcg capsule, Take 1 capsule (145 mcg) by mouth once daily in the morning. Take before meals. Do not crush or chew., Disp: 90 capsule, Rfl: 3    LORazepam (Ativan) 0.5 mg tablet, Take 1 tablet (0.5 mg) by mouth 3 times a day as needed for anxiety., Disp: 90 tablet, Rfl: 2    methadone (Dolophine) 5 mg tablet, Take 0.5 tablets (2.5 mg) by mouth once daily in the morning AND 0.5 tablets (2.5 mg) once daily in the evening AND 1 tablet (5 mg) once daily at bedtime., Disp: 60 tablet, Rfl: 0    multivitamin tablet, Take 1 tablet by mouth once daily., Disp: 30 tablet, Rfl: 11    naloxone (Narcan) 4 mg/0.1 mL nasal spray, ADMINISTER A SINGLE SPRAY INTRANASALLY INTO ONE NOSTRIL. CALL 911. MAY REPEAT X 1., Disp: , Rfl:     oxyCODONE (Roxicodone) 5 mg immediate release tablet, Take 1 tablet (5 mg) by mouth every 12 hours if needed for severe pain (7 - 10)., Disp: 60 tablet, Rfl: 0    rimegepant (NURTEC) 75 mg tablet,disintegrating, Dissolve 1 tablet (75 mg) in the mouth every other day., Disp: 16 tablet, Rfl: 11    sertraline (Zoloft) 100 mg tablet, Take 1 tablet (100 mg) by mouth once daily., Disp: , Rfl:     sodium,potassium,mag sulfates (Suprep) 17.5-3.13-1.6 gram solution, Take one bottle beginning at 6pm night before procedure and then take the other bottle 5 hours  before procedure time as directed per instruction sheet, Disp: 1 each, Rfl: 0    tirzepatide, weight loss, (Zepbound) 5 mg/0.5 mL injection, Inject 5 mg under the skin every 7 days., Disp: 4 each, Rfl: 2    topiramate (Topamax) 100 mg tablet, Take 1 tablet (100 mg) by mouth 3 times a day., Disp: 90 tablet, Rfl: 2    ascorbic acid (Vitamin C) 100 mg tablet, Take 1 tablet (100 mg) by mouth once daily. (Patient not taking: Reported on 5/13/2025), Disp: 90 tablet, Rfl: 3    budesonide (Rhinocort AQ) 32 mcg/actuation nasal spray, Administer 2 sprays into each nostril once daily. (Patient not taking: Reported on 5/13/2025), Disp: 8.6 g, Rfl: 11    doxycycline (Vibramycin) 100 mg capsule, Take 1 capsule (100 mg) by mouth 2 times a day for 14 days. Take with at least 8 ounces (large glass) of water, do not lie down for 30 minutes after (Patient not taking: Reported on 5/13/2025), Disp: 28 capsule, Rfl: 0    ferrous sulfate 325 (65 Fe) MG EC tablet, Take 1 tablet by mouth once daily with breakfast. (Patient not taking: Reported on 5/13/2025), Disp: 90 tablet, Rfl: 3    fexofenadine (Allegra) 180 mg tablet, Take by mouth. (Patient not taking: Reported on 5/13/2025), Disp: , Rfl:     montelukast (Singulair) 10 mg tablet, Take 1 tablet (10 mg) by mouth once daily at bedtime. (Patient not taking: Reported on 5/13/2025), Disp: 90 tablet, Rfl: 3

## 2025-05-13 NOTE — LETTER
May 13, 2025     Laureano Souza MD  [address] [facility] [place] [address]    Patient: Isaias Chamorro   YOB: 1980   Date of Visit: 5/13/2025       Dear Dr. Laureano Souza MD:    Thank you for referring Isaias Chamorro to me for evaluation. Below are my notes for this consultation.  If you have questions, please do not hesitate to call me. I look forward to following your patient along with you.       Sincerely,     Mele Sheehan MD      CC: Ramya Webber MD PhD  ______________________________________________________________________________________    In-clinic visit    Visit type: provider referral: Dr Souza    PCP: Laureano Souza MD.    Subjective    Isaias Chamorro is a 44 y.o. year old right handed female who presents with Migraine (Ref by Dr Ling).    Patient is accompanied by none.     HPI    States here having 2-fold problem. 1 is   having left face pain/numbness, thought to be possibly related to allergies and sinuses. Also feel there's migraine component.    Describes pain/numb that goes up left side of head/forehead/jaw/cheek/tip of nose. 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 States above symptoms seemed to have started with this. Facial pain progressively getting worse.    No throbbing. (+) light/sound sensitivity. (-) n/v.    Also has pain in back of neck >1 year.    HA 3-4x/week  Back of neck pain/pressure constant    No recent neuro consult. Saw neuro when much younger for RLS etc.    S/p GBP--wt gain  S/p amitriptyline--wt gain    Nurtec started 2 weeks ago by PCP, seemed to have lessened the pain some.    ENT wants to do turbinate resection, and was telling her likely to help with some of the facial symptoms she is having.    (+) asthma. No kidney stone. No heart problem. No glaucoma.    (+) eye check recently, no issues contributing to HA per pt.    No brain scan.    On LTG 100mg daily, methadone (1 year, prior  was on fentanyl)--for chronic pain (related to tumor), oxycodone prn  On Nurtec every other day--just started 2 weeks ago  On topiramate 300mg at bedtime--for nerve pain related to brachial plexus  Was given samples for Ubrelvy--never took it, never had to    No smoking. No alcohol. No street drug use.      Review of Systems   Constitutional:  Negative for appetite change, chills and fever.   HENT:  Negative for ear pain and nosebleeds.    Eyes:  Negative for discharge and itching.   Respiratory:  Negative for choking and chest tightness.    Cardiovascular:  Negative for chest pain and palpitations.   Gastrointestinal:  Negative for abdominal distention, abdominal pain and nausea.   Endocrine: Negative for cold intolerance and heat intolerance.   Genitourinary:  Negative for difficulty urinating and dysuria.   Musculoskeletal:  Negative for gait problem and myalgias.   Neurological:  Negative for dizziness, seizures.     Problem List[1]    Medical History[2]  Surgical History[3]  Social History     Tobacco Use   • Smoking status: Never     Passive exposure: Never   • Smokeless tobacco: Never   Substance Use Topics   • Alcohol use: Not Currently     family history includes Aortic aneurysm in her father and another family member; Abreu's esophagus in her mother; COPD in her father and mother; Clotting disorder in her father; Coronary artery disease in her father; Depression in her brother, father, and mother; Diabetes in her maternal grandmother, mother's sister, and other family members; Fibromyalgia in her mother; Graves' disease in her mother's sister; Hyperlipidemia in her father and mother; Hypertension in her father; Hypothyroidism in her maternal grandmother; Idiopathic PAH (pulmonary arterial hypertension) in her maternal grandmother and another family member; Kidney cancer in her mother's brother, mother's sister, and another family member; Lung cancer in her father and paternal grandmother; Melanoma in her  parent; Mental illness in an other family member; Neuropathy in her maternal grandmother and another family member; Pulmonary fibrosis in her maternal grandmother and mother's sister; Rheum arthritis in her mother's sister and another family member; Thyroid disease in her maternal grandmother.    Allergies[4]  Current Medications[5]    Objective    /71   Pulse 91     Awake, alert, oriented x3, in no distress  Well-nourished, ambulatory independently  No leg edema    (+) spasm bilat upper trapezius    Mental status exam as above, conversant   Fair fund of knowledge  Recent/remote memory fair  Fair attention span  Pupils round reactive to light, 3-4 mm, (-) RAPD   Fundoscopic examination was attempted but fundus was not visualized bilaterally   Full EOMs intact, no nystagmus, no ptosis   V1 to V3 sensation is intact   No facial droop   Hearing grossly intact   No dysarthria  Good shoulder shrug bilaterally   Tongue is midline     Motor strength is 5/5 on all extremities, tone/bulk normal   Reflexes 1+ on all 4 extremities except absent on B ankles, downgoing toes bilaterally   Sensation is intact to light touch, vibration on all 4 extremities   Finger to nose test intact bilaterally   Negative Romberg sign   Normal gait       Lab Results   Component Value Date    WBC 5.5 05/09/2025    RBC 4.49 05/09/2025    HGB 12.7 05/09/2025    HCT 38.4 05/09/2025     05/09/2025     05/09/2025    K 3.9 05/09/2025     05/09/2025    BUN 24 (H) 05/09/2025    CREATININE 0.69 05/09/2025    EGFR >90 05/09/2025    CALCIUM 8.7 05/09/2025    ALKPHOS 80 05/09/2025    AST 15 05/09/2025    ALT 11 05/09/2025    MG 2.13 08/05/2024    WWLTINAG79 392 02/06/2025    VITD25 26 (L) 02/06/2025    HGBA1C 5.1 05/09/2025    LDLCALC 89 02/06/2025    CHOL 159 02/06/2025    HDL 50 02/06/2025    TRIG 106 02/06/2025    TSH 4.07 02/06/2025        MR CERVICAL SPINE W AND WO IV CONTRAST 02/23/2021    Narrative  MRN: [id number]  Patient  Name: STEVEJanesBELEMMO DOWELL    STUDY:  MR CERVICAL WO/W CONTRAST;  2/23/2021 9:20 am    INDICATION:  A 40-year-old female with history of Hodgkin's lymphoma; presents  with ongoing neck pain which radiates to left upper extremity; MRI of  the cervical spine is requested for further characterization.    COMPARISON:  CT of the neck dated 07/07/2014;    ACCESSION NUMBER(S):  [id number]    ORDERING CLINICIAN:  BILL MOHR    TECHNIQUE:  Sagittal T1, T2, STIR, axial T1 and axial T2 weighted images were  acquired through the cervical spine.  Additional T1 weighted images  were obtained after intravenous administration of 18 mL of MultiHance  gadolinium base contrast.    FINDINGS:  The 1st rib and adjacent brachial plexus are partially encased by an  ill-defined approximately 2.5 cm diameter enhancing mass which  partially fills the left C7-T1 neural foramen extending to the  lateral recess. This extends inferiorly at least to the left T2  paraspinous region. The most inferior extent is not imaged.    There is an additional 2.3 cm lobulated mass/enlarged left  supraclavicular lymph node as well.    The T2 weighted images are at least moderately degraded by motion  artifact.    Alignment: There is straightening of the normal lordotic curvature of  the cervical spine, which may be due to position or muscle spasm.  There is 0.2 cm retrolisthesis of C4 on C5 and C5 on C6.    Vertebrae/Intervertebral Discs: The vertebral bodies demonstrate  expected height.  There are mild degenerative endplate signal changes  along the inferior plates of C3 and C4, otherwise the marrow signal  is within normal limits.    There is mild multilevel intervertebral disc desiccation, without  significant disc height loss.    Cervical spinal cord: There is suggestion of diffuse increased T2  signal within the ventral aspect of the cervical spinal cord, which  is likely artifactual. Otherwise, the cervical spinal cord  demonstrates normal size and  morphology, without evidence of abnormal  enhancement.    C1-C2: The cervicomedullary junction appears unremarkable. There is  no spinal canal stenosis.    C2-C3: There is no posterior disc contour abnormality. There is no  measurable spinal canal or neural foraminal stenosis.    C3-C4: There is central posterior disc herniation which effaces the  ventral thecal sac and indents on the spinal cord, with mild spinal  canal narrowing. There is no measurable neural foraminal stenosis.    C4-C5: There is right paracentral osteophytic spurring, which effaces  the ventral thecal sac and indents on the spinal cord, with mild  spinal canal narrowing. There is mild right-sided neural foraminal  due to the osteophytic spurring.    C5-C6: There is diffuse posterior disc bulging with mild osteophytic  spurring, which effaces the ventral thecal sac and contributes to  mild spinal canal narrowing. There is mild right-sided and moderate  left-sided neural foraminal narrowing due to bulging disc material.    C6-C7: There is minimal diffuse posterior disc bulging without  measurable spinal canal stenosis. There is no measurable neural  foraminal narrowing bilaterally.    C7-T1: There is no posterior disc contour abnormality. There is no  measurable spinal canal or neural foraminal stenosis.    The upper thoracic vertebrae and spinal canal are unremarkable.    The prevertebral and posterior paraspinous soft tissues are within  normal limits.    Impression  1. Tumor extends in the left paraspinous region and brachial plexus  partially filling the left C7-T1 neural foramen. This is associated  with an enlarged supraclavicular node/mass. PET-CT may be helpful for  further evaluation.    2. Moderate left-sided neural foraminal stenosis at C5-C6 due to  bulging disc material, with additional spondylitic changes of the  cervical spine as described above.    The [employer] system was used to transmit the results to Dr. Lambert.    GENEVA  personally reviewed the images/study and Dr. Pillai's  interpretation and I agree with the findings as stated. This study  was analyzed and interpreted at [facility], [place], [place].      Assessment/Plan    L facial paresthesias  Symptoms apparently started with Hodgkin's around 2001 and has been present for some time, worsening recently  I am unsure pt does have component of migraine--though states Nurtec every other day seems helping  Given lymphoma hx, will check MRI brain wwo to also eval for poss cranial nerve involvement  Pt already on topiramate 300mg at bedtime for pain as well    2.   Bilateral posterior neck/trapezius spasm/pain  Unclear if tumor has any contribution to this  Discussed, try neck exercises/massage therapy  Pt wants massage therapy referral thru [facility]    3. Chronic pain related to tumor/L brachial plexus issue  Seeing palliative oncology    Plans:  Do MRI brain wwo  Take care of neck/shoulder/trapezius--massages--[facility] referral  Continue topiramate/Nurtec every other day c/o prescriber    Rtc 2 mo    All questions were answered.  Pt knows how to contact my office in case pt has any questions or concerns.    Mele Sheehan MD              [1]  Patient Active Problem List  Diagnosis   • Allergic rhinitis   • Anxiety   • Arthritis   • ASCUS with positive high risk HPV cervical   • Abreu esophagus   • Bipolar affective disorder (Multi)   • Cervical radiculopathy   • Chronic cervical pain   • Chronic constipation   • CHRISTINA III (cervical intraepithelial neoplasia grade III) with severe dysplasia   • Hirsutism   • HLD (hyperlipidemia)   • Hodgkins lymphoma (Multi)   • Hypothyroidism   • IUD (intrauterine device) in place   • Mild intermittent asthma   • PCOS (polycystic ovarian syndrome)   • S/P bariatric surgery   • Seasonal allergies   • Vitamin D deficiency   • Anxiety and depression   • Chronic hypokalemia   • Eczema   • Hypertrophic scar    • Asthma   • Cough variant asthma ([facility])   • Intestinal malabsorption   • Intractable neuropathic pain of hand   • Polyneuropathy   • Splenic infarction   • Status post bone marrow transplant (Multi)   • Thoracic degenerative disc disease   • Urinary, incontinence, stress female   • Uvular swelling   • Vocal cord dysfunction   • Fibromyalgia   • Anemia in other chronic diseases classified elsewhere   • Mediastinal mass   • Abnormal fasting glucose   [2]  Past Medical History:  Diagnosis Date   • Bipolar disorder, currently in remission, most recent episode unspecified (Multi) 08/17/2016    History of depressed bipolar disorder   • Bipolar disorder, unspecified (Multi) 04/04/2014    Bipolar affective   • Dorsalgia, unspecified 02/26/2020    Mid back pain   • Dysplasia of cervix uteri, unspecified 10/26/2017    Cervical intraepithelial neoplasia (CHRISTINA)   • Encounter for follow-up examination after completed treatment for conditions other than malignant neoplasm 09/22/2022    Hospital discharge follow-up   • Encounter for gynecological examination (general) (routine) without abnormal findings 09/01/2017    Well woman exam with routine gynecological exam   • Gastro-esophageal reflux disease without esophagitis 08/31/2016    Esophageal reflux finding   • Hx antineoplastic chemo June 2013   • Hypothyroidism 2006?   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    Splenic infarct   • Moderate cervical dysplasia 12/08/2017    CHRISTINA II (cervical intraepithelial neoplasia II)   • Other conditions influencing health status 05/27/2020    Diabetes mellitus type 2, uncontrolled   • Other conditions influencing health status 10/26/2017    Encounter for procedure   • Other general symptoms and signs 04/11/2018    Flu-like symptoms   • Other long term (current) drug therapy 03/04/2015    Long term use of drug   • Other specified disorders of nose and nasal  sinuses 12/01/2016    Sinus pressure   • Other specified predominantly sexually transmitted diseases     Other specified predominantly sexually transmitted diseases   • Otitis media, unspecified, right ear 11/21/2017    Otitis media of right ear   • Pain in unspecified joint 05/08/2018    Chronic pain of multiple joints   • Peripheral neuropathy 2003   • Personal history of antineoplastic chemotherapy 04/02/2014    Personal history of chemotherapy   • Personal history of Hodgkin lymphoma     History of Hodgkin's lymphoma   • Personal history of irradiation July 2015 and Sept 2021   • Personal history of other diseases of the circulatory system 12/19/2016    History of hypertension   • Personal history of other diseases of the circulatory system 08/23/2017    History of hypertension   • Personal history of other diseases of the digestive system 07/31/2017    History of Abreu's esophagus   • Personal history of other diseases of the female genital tract 08/17/2016    History of polycystic ovarian syndrome   • Personal history of other diseases of the musculoskeletal system and connective tissue 08/07/2020    History of neck pain   • Personal history of other diseases of the respiratory system 12/01/2016    History of sore throat   • Personal history of other diseases of the respiratory system 12/19/2016    History of influenza   • Personal history of other diseases of the respiratory system 07/23/2021    History of acute sinusitis   • Personal history of other diseases of the respiratory system 07/05/2016    History of acute bronchitis   • Personal history of other diseases of the respiratory system 02/04/2021    History of acute sinusitis   • Personal history of other diseases of the respiratory system 04/11/2018    History of acute bronchitis   • Personal history of other endocrine, nutritional and metabolic disease 01/19/2016    History of hypothyroidism   • Personal history of other endocrine, nutritional and  metabolic disease 04/04/2014    History of hypothyroidism   • Personal history of other medical treatment     History of screening mammography   • Personal history of other specified conditions 10/29/2019    History of fatigue   • Personal history of other specified conditions 07/19/2017    History of chronic cough   • Personal history of other specified conditions 04/11/2018    History of wheezing   • Personal history of other venous thrombosis and embolism     H/O blood clots   • Personal history of pneumonia (recurrent) 06/06/2014    History of pneumonia   • Prediabetes 11/01/2019    Pre-diabetes   • Protein S deficiency (Multi)    • Splenic infarct    • Type 2 diabetes mellitus 2018   • Vasomotor rhinitis 07/31/2017    Non-allergic vasomotor rhinitis   • Vitamin D deficiency 2006   [3]  Past Surgical History:  Procedure Laterality Date   • CT GUIDED IMAGING FOR NEEDLE PLACEMENT  05/31/2013    CT GUIDED IMAGING FOR NEEDLE PLACEMENT [facility]   • FOOT SURGERY  01/19/2016    Foot Surgery   • GASTRIC RESTRICTION SURGERY  2020   • KNEE SURGERY  01/19/2016    Knee Surgery   • MR HEAD ANGIO WO IV CONTRAST  03/02/2015    MR HEAD ANGIO WO IV CONTRAST 3/2/2015 [facility]LLARY LEGAC[facility]   • OTHER SURGICAL HISTORY  12/16/2020    Katy-en-Y gastric bypass   • OTHER SURGICAL HISTORY  04/02/2014    Biopsy Bone Marrow   • OTHER SURGICAL HISTORY  01/19/2016    Wrist Carpectomy   • OTHER SURGICAL HISTORY  04/04/2014    Tunneled Central IV Removal With Port   • OTHER SURGICAL HISTORY  04/04/2014    Thoracoscopy Of The Mediastinal Space   • TONSILLECTOMY  2004    Tonsillectomy   • WISDOM TOOTH EXTRACTION  1995   [4]  Allergies  Allergen Reactions   • Cephalexin Anaphylaxis, Rash and Unknown   • Keytruda [Pembrolizumab] Anaphylaxis   • Penicillins Anaphylaxis, Rash and Unknown   • Lactose Unknown   • Latex Hives and Unknown   • Sulfa (Sulfonamide Antibiotics) Other and Unknown   • Sulfamethoxazole Unknown   • Azithromycin Rash    • Clarithromycin Rash and Unknown   • Erythromycin Base Rash   • Macrolide Antibiotics Rash   [5]    Current Outpatient Medications:   •  albuterol 90 mcg/actuation inhaler, Inhale 2 puffs every 4 hours if needed for wheezing., Disp: 18 g, Rfl: 11  •  biotin 10,000 mcg capsule, Take 1 capsule (10 mg) by mouth once daily., Disp: 90 capsule, Rfl: 3  •  calcium cit-mag-D3-Zn--maxine (Calcium Citrate Plus) 250 mg-40 mg- 125 unit-3.75mg tablet, Take 1 tablet by mouth once daily., Disp: 90 tablet, Rfl: 3  •  cholecalciferol (Vitamin D3) 5,000 Units tablet, Take 1 tablet (5,000 Units) by mouth once daily., Disp: 90 tablet, Rfl: 3  •  cyanocobalamin (Vitamin B-12) 1,000 mcg tablet, Take 1 tablet (1,000 mcg) by mouth once daily., Disp: 90 tablet, Rfl: 3  •  cyclobenzaprine (Flexeril) 5 mg tablet, Take 1 tablet (5 mg) by mouth as needed at bedtime for muscle spasms., Disp: , Rfl:   •  estradiol (Vivelle-DOT) 0.0375 mg/24 hr, Place 1 patch over 96 hours on the skin 2 times a week., Disp: 8 patch, Rfl: 11  •  lamoTRIgine (LaMICtal) 100 mg tablet, Take 1 tablet (100 mg) by mouth once daily., Disp: , Rfl:   •  levonorgestrel (Mirena) 21 mcg/24 hours (8 yrs) 52 mg IUD, Intrauterine, Disp: , Rfl:   •  levothyroxine (Synthroid, Levoxyl) 200 mcg tablet, Take 1 tablet (200 mcg) by mouth once daily in the morning. Take before meals., Disp: 90 tablet, Rfl: 3  •  linaCLOtide (Linzess) 145 mcg capsule, Take 1 capsule (145 mcg) by mouth once daily in the morning. Take before meals. Do not crush or chew., Disp: 90 capsule, Rfl: 3  •  LORazepam (Ativan) 0.5 mg tablet, Take 1 tablet (0.5 mg) by mouth 3 times a day as needed for anxiety., Disp: 90 tablet, Rfl: 2  •  methadone (Dolophine) 5 mg tablet, Take 0.5 tablets (2.5 mg) by mouth once daily in the morning AND 0.5 tablets (2.5 mg) once daily in the evening AND 1 tablet (5 mg) once daily at bedtime., Disp: 60 tablet, Rfl: 0  •  multivitamin tablet, Take 1 tablet by mouth once daily.,  Disp: 30 tablet, Rfl: 11  •  naloxone (Narcan) 4 mg/0.1 mL nasal spray, ADMINISTER A SINGLE SPRAY INTRANASALLY INTO ONE NOSTRIL. CALL 911. MAY REPEAT X 1., Disp: , Rfl:   •  oxyCODONE (Roxicodone) 5 mg immediate release tablet, Take 1 tablet (5 mg) by mouth every 12 hours if needed for severe pain (7 - 10)., Disp: 60 tablet, Rfl: 0  •  rimegepant (NURTEC) 75 mg tablet,disintegrating, Dissolve 1 tablet (75 mg) in the mouth every other day., Disp: 16 tablet, Rfl: 11  •  sertraline (Zoloft) 100 mg tablet, Take 1 tablet (100 mg) by mouth once daily., Disp: , Rfl:   •  sodium,potassium,mag sulfates (Suprep) 17.5-3.13-1.6 gram solution, Take one bottle beginning at 6pm night before procedure and then take the other bottle 5 hours before procedure time as directed per instruction sheet, Disp: 1 each, Rfl: 0  •  tirzepatide, weight loss, (Zepbound) 5 mg/0.5 mL injection, Inject 5 mg under the skin every 7 days., Disp: 4 each, Rfl: 2  •  topiramate (Topamax) 100 mg tablet, Take 1 tablet (100 mg) by mouth 3 times a day., Disp: 90 tablet, Rfl: 2  •  ascorbic acid (Vitamin C) 100 mg tablet, Take 1 tablet (100 mg) by mouth once daily. (Patient not taking: Reported on 5/13/2025), Disp: 90 tablet, Rfl: 3  •  budesonide (Rhinocort AQ) 32 mcg/actuation nasal spray, Administer 2 sprays into each nostril once daily. (Patient not taking: Reported on 5/13/2025), Disp: 8.6 g, Rfl: 11  •  doxycycline (Vibramycin) 100 mg capsule, Take 1 capsule (100 mg) by mouth 2 times a day for 14 days. Take with at least 8 ounces (large glass) of water, do not lie down for 30 minutes after (Patient not taking: Reported on 5/13/2025), Disp: 28 capsule, Rfl: 0  •  ferrous sulfate 325 (65 Fe) MG EC tablet, Take 1 tablet by mouth once daily with breakfast. (Patient not taking: Reported on 5/13/2025), Disp: 90 tablet, Rfl: 3  •  fexofenadine (Allegra) 180 mg tablet, Take by mouth. (Patient not taking: Reported on 5/13/2025), Disp: , Rfl:   •  montelukast  (Singulair) 10 mg tablet, Take 1 tablet (10 mg) by mouth once daily at bedtime. (Patient not taking: Reported on 5/13/2025), Disp: 90 tablet, Rfl: 3       [1]  Patient Active Problem List  Diagnosis   • Allergic rhinitis   • Anxiety   • Arthritis   • ASCUS with positive high risk HPV cervical   • Abreu esophagus   • Bipolar affective disorder (Multi)   • Cervical radiculopathy   • Chronic cervical pain   • Chronic constipation   • CHRISTINA III (cervical intraepithelial neoplasia grade III) with severe dysplasia   • Hirsutism   • HLD (hyperlipidemia)   • Hodgkins lymphoma (Multi)   • Hypothyroidism   • IUD (intrauterine device) in place   • Mild intermittent asthma   • PCOS (polycystic ovarian syndrome)   • S/P bariatric surgery   • Seasonal allergies   • Vitamin D deficiency   • Anxiety and depression   • Chronic hypokalemia   • Eczema   • Hypertrophic scar   • Asthma   • Cough variant asthma ([facility])   • Intestinal malabsorption   • Intractable neuropathic pain of hand   • Polyneuropathy   • Splenic infarction   • Status post bone marrow transplant (Multi)   • Thoracic degenerative disc disease   • Urinary, incontinence, stress female   • Uvular swelling   • Vocal cord dysfunction   • Fibromyalgia   • Anemia in other chronic diseases classified elsewhere   • Mediastinal mass   • Abnormal fasting glucose   [2]  Past Medical History:  Diagnosis Date   • Bipolar disorder, currently in remission, most recent episode unspecified (Multi) 08/17/2016    History of depressed bipolar disorder   • Bipolar disorder, unspecified (Multi) 04/04/2014    Bipolar affective   • Dorsalgia, unspecified 02/26/2020    Mid back pain   • Dysplasia of cervix uteri, unspecified 10/26/2017    Cervical intraepithelial neoplasia (CHRISTINA)   • Encounter for follow-up examination after completed treatment for conditions other than malignant neoplasm 09/22/2022    Hospital discharge follow-up   • Encounter for gynecological examination (general)  (routine) without abnormal findings 09/01/2017    Well woman exam with routine gynecological exam   • Gastro-esophageal reflux disease without esophagitis 08/31/2016    Esophageal reflux finding   • Hx antineoplastic chemo June 2013   • Hypothyroidism 2006?   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    Splenic infarct   • Moderate cervical dysplasia 12/08/2017    CHRISTINA II (cervical intraepithelial neoplasia II)   • Other conditions influencing health status 05/27/2020    Diabetes mellitus type 2, uncontrolled   • Other conditions influencing health status 10/26/2017    Encounter for procedure   • Other general symptoms and signs 04/11/2018    Flu-like symptoms   • Other long term (current) drug therapy 03/04/2015    Long term use of drug   • Other specified disorders of nose and nasal sinuses 12/01/2016    Sinus pressure   • Other specified predominantly sexually transmitted diseases     Other specified predominantly sexually transmitted diseases   • Otitis media, unspecified, right ear 11/21/2017    Otitis media of right ear   • Pain in unspecified joint 05/08/2018    Chronic pain of multiple joints   • Peripheral neuropathy 2003   • Personal history of antineoplastic chemotherapy 04/02/2014    Personal history of chemotherapy   • Personal history of Hodgkin lymphoma     History of Hodgkin's lymphoma   • Personal history of irradiation July 2015 and Sept 2021   • Personal history of other diseases of the circulatory system 12/19/2016    History of hypertension   • Personal history of other diseases of the circulatory system 08/23/2017    History of hypertension   • Personal history of other diseases of the digestive system 07/31/2017    History of Abreu's esophagus   • Personal history of other diseases of the female genital tract 08/17/2016    History of polycystic ovarian syndrome   • Personal history of other diseases of the  musculoskeletal system and connective tissue 08/07/2020    History of neck pain   • Personal history of other diseases of the respiratory system 12/01/2016    History of sore throat   • Personal history of other diseases of the respiratory system 12/19/2016    History of influenza   • Personal history of other diseases of the respiratory system 07/23/2021    History of acute sinusitis   • Personal history of other diseases of the respiratory system 07/05/2016    History of acute bronchitis   • Personal history of other diseases of the respiratory system 02/04/2021    History of acute sinusitis   • Personal history of other diseases of the respiratory system 04/11/2018    History of acute bronchitis   • Personal history of other endocrine, nutritional and metabolic disease 01/19/2016    History of hypothyroidism   • Personal history of other endocrine, nutritional and metabolic disease 04/04/2014    History of hypothyroidism   • Personal history of other medical treatment     History of screening mammography   • Personal history of other specified conditions 10/29/2019    History of fatigue   • Personal history of other specified conditions 07/19/2017    History of chronic cough   • Personal history of other specified conditions 04/11/2018    History of wheezing   • Personal history of other venous thrombosis and embolism     H/O blood clots   • Personal history of pneumonia (recurrent) 06/06/2014    History of pneumonia   • Prediabetes 11/01/2019    Pre-diabetes   • Protein S deficiency (Multi)    • Splenic infarct    • Type 2 diabetes mellitus 2018   • Vasomotor rhinitis 07/31/2017    Non-allergic vasomotor rhinitis   • Vitamin D deficiency 2006   [3]  Past Surgical History:  Procedure Laterality Date   • CT GUIDED IMAGING FOR NEEDLE PLACEMENT  05/31/2013    CT GUIDED IMAGING FOR NEEDLE PLACEMENT [facility]   • FOOT SURGERY  01/19/2016    Foot Surgery   • GASTRIC RESTRICTION SURGERY  2020   • KNEE SURGERY   01/19/2016    Knee Surgery   • MR HEAD ANGIO WO IV CONTRAST  03/02/2015    MR HEAD ANGIO WO IV CONTRAST 3/2/2015 [facility]   • OTHER SURGICAL HISTORY  12/16/2020    Katy-en-Y gastric bypass   • OTHER SURGICAL HISTORY  04/02/2014    Biopsy Bone Marrow   • OTHER SURGICAL HISTORY  01/19/2016    Wrist Carpectomy   • OTHER SURGICAL HISTORY  04/04/2014    Tunneled Central IV Removal With Port   • OTHER SURGICAL HISTORY  04/04/2014    Thoracoscopy Of The Mediastinal Space   • TONSILLECTOMY  2004    Tonsillectomy   • WISDOM TOOTH EXTRACTION  1995   [4]  Allergies  Allergen Reactions   • Cephalexin Anaphylaxis, Rash and Unknown   • Keytruda [Pembrolizumab] Anaphylaxis   • Penicillins Anaphylaxis, Rash and Unknown   • Lactose Unknown   • Latex Hives and Unknown   • Sulfa (Sulfonamide Antibiotics) Other and Unknown   • Sulfamethoxazole Unknown   • Azithromycin Rash   • Clarithromycin Rash and Unknown   • Erythromycin Base Rash   • Macrolide Antibiotics Rash   [5]    Current Outpatient Medications:   •  albuterol 90 mcg/actuation inhaler, Inhale 2 puffs every 4 hours if needed for wheezing., Disp: 18 g, Rfl: 11  •  biotin 10,000 mcg capsule, Take 1 capsule (10 mg) by mouth once daily., Disp: 90 capsule, Rfl: 3  •  calcium cit-mag-D3-Zn--maxine (Calcium Citrate Plus) 250 mg-40 mg- 125 unit-3.75mg tablet, Take 1 tablet by mouth once daily., Disp: 90 tablet, Rfl: 3  •  cholecalciferol (Vitamin D3) 5,000 Units tablet, Take 1 tablet (5,000 Units) by mouth once daily., Disp: 90 tablet, Rfl: 3  •  cyanocobalamin (Vitamin B-12) 1,000 mcg tablet, Take 1 tablet (1,000 mcg) by mouth once daily., Disp: 90 tablet, Rfl: 3  •  cyclobenzaprine (Flexeril) 5 mg tablet, Take 1 tablet (5 mg) by mouth as needed at bedtime for muscle spasms., Disp: , Rfl:   •  estradiol (Vivelle-DOT) 0.0375 mg/24 hr, Place 1 patch over 96 hours on the skin 2 times a week., Disp: 8 patch, Rfl: 11  •  lamoTRIgine (LaMICtal) 100 mg tablet, Take 1 tablet  (100 mg) by mouth once daily., Disp: , Rfl:   •  levonorgestrel (Mirena) 21 mcg/24 hours (8 yrs) 52 mg IUD, Intrauterine, Disp: , Rfl:   •  levothyroxine (Synthroid, Levoxyl) 200 mcg tablet, Take 1 tablet (200 mcg) by mouth once daily in the morning. Take before meals., Disp: 90 tablet, Rfl: 3  •  linaCLOtide (Linzess) 145 mcg capsule, Take 1 capsule (145 mcg) by mouth once daily in the morning. Take before meals. Do not crush or chew., Disp: 90 capsule, Rfl: 3  •  LORazepam (Ativan) 0.5 mg tablet, Take 1 tablet (0.5 mg) by mouth 3 times a day as needed for anxiety., Disp: 90 tablet, Rfl: 2  •  methadone (Dolophine) 5 mg tablet, Take 0.5 tablets (2.5 mg) by mouth once daily in the morning AND 0.5 tablets (2.5 mg) once daily in the evening AND 1 tablet (5 mg) once daily at bedtime., Disp: 60 tablet, Rfl: 0  •  multivitamin tablet, Take 1 tablet by mouth once daily., Disp: 30 tablet, Rfl: 11  •  naloxone (Narcan) 4 mg/0.1 mL nasal spray, ADMINISTER A SINGLE SPRAY INTRANASALLY INTO ONE NOSTRIL. CALL 911. MAY REPEAT X 1., Disp: , Rfl:   •  oxyCODONE (Roxicodone) 5 mg immediate release tablet, Take 1 tablet (5 mg) by mouth every 12 hours if needed for severe pain (7 - 10)., Disp: 60 tablet, Rfl: 0  •  rimegepant (NURTEC) 75 mg tablet,disintegrating, Dissolve 1 tablet (75 mg) in the mouth every other day., Disp: 16 tablet, Rfl: 11  •  sertraline (Zoloft) 100 mg tablet, Take 1 tablet (100 mg) by mouth once daily., Disp: , Rfl:   •  sodium,potassium,mag sulfates (Suprep) 17.5-3.13-1.6 gram solution, Take one bottle beginning at 6pm night before procedure and then take the other bottle 5 hours before procedure time as directed per instruction sheet, Disp: 1 each, Rfl: 0  •  tirzepatide, weight loss, (Zepbound) 5 mg/0.5 mL injection, Inject 5 mg under the skin every 7 days., Disp: 4 each, Rfl: 2  •  topiramate (Topamax) 100 mg tablet, Take 1 tablet (100 mg) by mouth 3 times a day., Disp: 90 tablet, Rfl: 2  •  ascorbic acid  (Vitamin C) 100 mg tablet, Take 1 tablet (100 mg) by mouth once daily. (Patient not taking: Reported on 5/13/2025), Disp: 90 tablet, Rfl: 3  •  budesonide (Rhinocort AQ) 32 mcg/actuation nasal spray, Administer 2 sprays into each nostril once daily. (Patient not taking: Reported on 5/13/2025), Disp: 8.6 g, Rfl: 11  •  doxycycline (Vibramycin) 100 mg capsule, Take 1 capsule (100 mg) by mouth 2 times a day for 14 days. Take with at least 8 ounces (large glass) of water, do not lie down for 30 minutes after (Patient not taking: Reported on 5/13/2025), Disp: 28 capsule, Rfl: 0  •  ferrous sulfate 325 (65 Fe) MG EC tablet, Take 1 tablet by mouth once daily with breakfast. (Patient not taking: Reported on 5/13/2025), Disp: 90 tablet, Rfl: 3  •  fexofenadine (Allegra) 180 mg tablet, Take by mouth. (Patient not taking: Reported on 5/13/2025), Disp: , Rfl:   •  montelukast (Singulair) 10 mg tablet, Take 1 tablet (10 mg) by mouth once daily at bedtime. (Patient not taking: Reported on 5/13/2025), Disp: 90 tablet, Rfl: 3

## 2025-05-13 NOTE — PATIENT INSTRUCTIONS
Do MRI brain wwo  Take care of neck/shoulder/trapezius--massages--Keshawn referral  Continue topiramate/Nurtec every other day c/o prescriber    Rtc 2 mo

## 2025-06-02 ENCOUNTER — APPOINTMENT (OUTPATIENT)
Dept: GASTROENTEROLOGY | Facility: EXTERNAL LOCATION | Age: 45
End: 2025-06-02
Payer: COMMERCIAL

## 2025-06-02 DIAGNOSIS — Z83.719 FAMILY HISTORY OF COLON POLYPS, UNSPECIFIED: ICD-10-CM

## 2025-06-02 DIAGNOSIS — K22.70 BARRETT'S ESOPHAGUS WITHOUT DYSPLASIA: Primary | ICD-10-CM

## 2025-06-02 DIAGNOSIS — Z98.84 STATUS POST BARIATRIC SURGERY: ICD-10-CM

## 2025-06-02 DIAGNOSIS — K59.01 SLOW TRANSIT CONSTIPATION: ICD-10-CM

## 2025-06-02 DIAGNOSIS — Z12.11 COLON CANCER SCREENING: ICD-10-CM

## 2025-06-02 DIAGNOSIS — R19.4 CHANGE IN BOWEL HABIT: ICD-10-CM

## 2025-06-02 PROCEDURE — 88305 TISSUE EXAM BY PATHOLOGIST: CPT | Performed by: PATHOLOGY

## 2025-06-02 PROCEDURE — 88305 TISSUE EXAM BY PATHOLOGIST: CPT

## 2025-06-02 PROCEDURE — 45378 DIAGNOSTIC COLONOSCOPY: CPT | Performed by: INTERNAL MEDICINE

## 2025-06-02 PROCEDURE — 43239 EGD BIOPSY SINGLE/MULTIPLE: CPT | Performed by: INTERNAL MEDICINE

## 2025-06-03 ENCOUNTER — LAB REQUISITION (OUTPATIENT)
Dept: LAB | Facility: HOSPITAL | Age: 45
End: 2025-06-03
Payer: COMMERCIAL

## 2025-06-03 DIAGNOSIS — Z98.84 BARIATRIC SURGERY STATUS: ICD-10-CM

## 2025-06-04 ENCOUNTER — HOSPITAL ENCOUNTER (OUTPATIENT)
Dept: RADIOLOGY | Facility: HOSPITAL | Age: 45
Discharge: HOME | End: 2025-06-04
Payer: COMMERCIAL

## 2025-06-04 DIAGNOSIS — R20.0 FACIAL NUMBNESS: ICD-10-CM

## 2025-06-04 PROCEDURE — 2550000001 HC RX 255 CONTRASTS: Mod: JW | Performed by: PSYCHIATRY & NEUROLOGY

## 2025-06-04 PROCEDURE — 70553 MRI BRAIN STEM W/O & W/DYE: CPT

## 2025-06-04 PROCEDURE — A9575 INJ GADOTERATE MEGLUMI 0.1ML: HCPCS | Mod: JW | Performed by: PSYCHIATRY & NEUROLOGY

## 2025-06-04 RX ORDER — GADOTERATE MEGLUMINE 376.9 MG/ML
16 INJECTION INTRAVENOUS
Status: COMPLETED | OUTPATIENT
Start: 2025-06-04 | End: 2025-06-04

## 2025-06-04 RX ADMIN — GADOTERATE MEGLUMINE 16 ML: 376.9 INJECTION INTRAVENOUS at 13:38

## 2025-06-05 ENCOUNTER — HOSPITAL ENCOUNTER (OUTPATIENT)
Dept: RADIOLOGY | Facility: CLINIC | Age: 45
Discharge: HOME | End: 2025-06-05
Payer: COMMERCIAL

## 2025-06-05 DIAGNOSIS — C81.70 OTHER CLASSICAL HODGKIN LYMPHOMA, UNSPECIFIED BODY REGION (MULTI): ICD-10-CM

## 2025-06-05 PROCEDURE — 74177 CT ABD & PELVIS W/CONTRAST: CPT

## 2025-06-05 PROCEDURE — 2550000001 HC RX 255 CONTRASTS: Performed by: STUDENT IN AN ORGANIZED HEALTH CARE EDUCATION/TRAINING PROGRAM

## 2025-06-05 RX ADMIN — IOHEXOL 75 ML: 350 INJECTION, SOLUTION INTRAVENOUS at 11:24

## 2025-06-11 ENCOUNTER — TELEPHONE (OUTPATIENT)
Dept: HEMATOLOGY/ONCOLOGY | Facility: HOSPITAL | Age: 45
End: 2025-06-11
Payer: COMMERCIAL

## 2025-06-11 ENCOUNTER — PATIENT MESSAGE (OUTPATIENT)
Dept: PRIMARY CARE | Facility: CLINIC | Age: 45
End: 2025-06-11
Payer: COMMERCIAL

## 2025-06-11 DIAGNOSIS — G89.3 CHRONIC PAIN DUE TO NEOPLASM: ICD-10-CM

## 2025-06-11 DIAGNOSIS — G43.101 MIGRAINE WITH AURA AND WITH STATUS MIGRAINOSUS, NOT INTRACTABLE: Primary | ICD-10-CM

## 2025-06-11 DIAGNOSIS — G89.3 CANCER RELATED PAIN: ICD-10-CM

## 2025-06-11 RX ORDER — OXYCODONE HYDROCHLORIDE 5 MG/1
5 TABLET ORAL EVERY 12 HOURS PRN
Qty: 60 TABLET | Refills: 0 | Status: SHIPPED | OUTPATIENT
Start: 2025-06-11 | End: 2025-07-11

## 2025-06-11 RX ORDER — METHADONE HYDROCHLORIDE 5 MG/1
TABLET ORAL
Qty: 60 TABLET | Refills: 0 | Status: SHIPPED | OUTPATIENT
Start: 2025-06-11 | End: 2025-07-11

## 2025-06-11 NOTE — TELEPHONE ENCOUNTER
Patient last seen by MORRO Quan on 5/9 with plan to continue methadone 2.5/2.5/5mg and oxycodone 2.5-5mg q12h PRN. Follow up visit is scheduled for 7/25. OARRS reviewed and no aberrancy noted. On 5/9 patient last filled methadone 5mg #60/30 days and oxycodone 5mg #60/30 days. Prescriptions pended to provider to approve.

## 2025-06-11 NOTE — TELEPHONE ENCOUNTER
Refill requests received for the following medications:    Methadone 5mg  Oxycodone 5mg    Preferred pharmacy is [facility] in [place].    Message sent to Palliative Care team.

## 2025-06-13 ENCOUNTER — PATIENT MESSAGE (OUTPATIENT)
Dept: PRIMARY CARE | Facility: CLINIC | Age: 45
End: 2025-06-13
Payer: COMMERCIAL

## 2025-06-13 DIAGNOSIS — C81.90 HODGKIN LYMPHOMA, UNSPECIFIED HODGKIN LYMPHOMA TYPE, UNSPECIFIED BODY REGION (MULTI): ICD-10-CM

## 2025-06-13 DIAGNOSIS — G47.33 OSA (OBSTRUCTIVE SLEEP APNEA): ICD-10-CM

## 2025-06-13 DIAGNOSIS — J45.20 MILD INTERMITTENT ASTHMA WITHOUT COMPLICATION (HHS-HCC): Primary | ICD-10-CM

## 2025-06-13 LAB
LABORATORY COMMENT REPORT: NORMAL
PATH REPORT.FINAL DX SPEC: NORMAL
PATH REPORT.GROSS SPEC: NORMAL
PATH REPORT.RELEVANT HX SPEC: NORMAL
PATH REPORT.TOTAL CANCER: NORMAL

## 2025-06-13 RX ORDER — ALBUTEROL SULFATE 90 UG/1
2 INHALANT RESPIRATORY (INHALATION) EVERY 4 HOURS PRN
Qty: 18 G | Refills: 11 | Status: SHIPPED | OUTPATIENT
Start: 2025-06-13 | End: 2026-06-13

## 2025-06-16 DIAGNOSIS — K22.70 BARRETT'S ESOPHAGUS WITHOUT DYSPLASIA: Primary | ICD-10-CM

## 2025-06-16 RX ORDER — OMEPRAZOLE 40 MG/1
40 CAPSULE, DELAYED RELEASE ORAL DAILY
Qty: 90 CAPSULE | Refills: 1 | Status: SHIPPED | OUTPATIENT
Start: 2025-06-16 | End: 2026-06-16

## 2025-06-16 NOTE — RESULT ENCOUNTER NOTE
Dear Ms. Zhao Chamorro:    This message is to inform you the result of the biopsies taken during your recent EGD (upper endoscopy).    Your esophageal biopsies show evidence of Abreu's esophagus.  Abreu's esophagus is thought to be a pre-malignant condition of the esophagus.  This implies that you have had acid reflux for many years and requires daily medication to control acid. There was no dysplasia present in your biopsies.    I recommend an upper endoscopy in THREE (3) years for surveillance.      We hope this information is helpful to you. Your health and the quality care you receive are important to us. Please call our office should you have any questions or concerns.    Sincerely,    Diaz Garcia MD [facility]  Phone: [phone], option [phone]  Fax: [phone]

## 2025-07-02 ENCOUNTER — PATIENT MESSAGE (OUTPATIENT)
Dept: PRIMARY CARE | Facility: CLINIC | Age: 45
End: 2025-07-02
Payer: COMMERCIAL

## 2025-07-02 DIAGNOSIS — C81.90 HODGKIN LYMPHOMA, UNSPECIFIED HODGKIN LYMPHOMA TYPE, UNSPECIFIED BODY REGION (MULTI): ICD-10-CM

## 2025-07-02 DIAGNOSIS — R73.01 ABNORMAL FASTING GLUCOSE: ICD-10-CM

## 2025-07-02 DIAGNOSIS — Z98.84 STATUS POST BARIATRIC SURGERY: ICD-10-CM

## 2025-07-02 DIAGNOSIS — G47.33 OSA (OBSTRUCTIVE SLEEP APNEA): Primary | ICD-10-CM

## 2025-07-02 DIAGNOSIS — Z94.81 STATUS POST BONE MARROW TRANSPLANT (MULTI): ICD-10-CM

## 2025-07-02 RX ORDER — ONDANSETRON 4 MG/1
4 TABLET, ORALLY DISINTEGRATING ORAL EVERY 8 HOURS PRN
Qty: 20 TABLET | Refills: 0 | Status: SHIPPED | OUTPATIENT
Start: 2025-07-02 | End: 2025-07-09

## 2025-07-07 ENCOUNTER — TELEPHONE (OUTPATIENT)
Dept: HEMATOLOGY/ONCOLOGY | Facility: HOSPITAL | Age: 45
End: 2025-07-07
Payer: COMMERCIAL

## 2025-07-07 DIAGNOSIS — G89.3 CANCER RELATED PAIN: ICD-10-CM

## 2025-07-07 DIAGNOSIS — G89.3 CHRONIC PAIN DUE TO NEOPLASM: ICD-10-CM

## 2025-07-07 RX ORDER — OXYCODONE HYDROCHLORIDE 5 MG/1
5 TABLET ORAL EVERY 12 HOURS PRN
Qty: 60 TABLET | Refills: 0 | Status: SHIPPED | OUTPATIENT
Start: 2025-07-10 | End: 2025-08-09

## 2025-07-07 RX ORDER — METHADONE HYDROCHLORIDE 5 MG/1
TABLET ORAL
Qty: 60 TABLET | Refills: 0 | Status: SHIPPED | OUTPATIENT
Start: 2025-07-10 | End: 2025-08-09

## 2025-07-07 NOTE — TELEPHONE ENCOUNTER
Refill request received for the following medications:    Methadone 5mg  Oxycodone 5mg    Preferred pharmacy is [facility] in [place].    Message sent to Palliative Care team,.

## 2025-07-07 NOTE — TELEPHONE ENCOUNTER
Patient last seen by MORRO Quan on 5/9 with plan to continue oxycodone 2.5-5mg q12h PRN and methadone 2.5/2.5/5mg. Follow up visit is scheduled for 7/22. OARRS reviewed and no aberrancy noted. On 6/11 patient last filled oxycodone 5mg #60/30 days and methadone 5mg #60/30 days. Prescriptions pended to provider to approve.

## 2025-07-17 ENCOUNTER — APPOINTMENT (OUTPATIENT)
Dept: ENDOCRINOLOGY | Facility: CLINIC | Age: 45
End: 2025-07-17
Payer: COMMERCIAL

## 2025-07-17 VITALS
HEART RATE: 113 BPM | HEIGHT: 58 IN | DIASTOLIC BLOOD PRESSURE: 76 MMHG | BODY MASS INDEX: 37.41 KG/M2 | SYSTOLIC BLOOD PRESSURE: 108 MMHG | WEIGHT: 178.2 LBS

## 2025-07-17 DIAGNOSIS — L65.9 HAIR LOSS: ICD-10-CM

## 2025-07-17 DIAGNOSIS — E03.8 OTHER SPECIFIED HYPOTHYROIDISM: Primary | ICD-10-CM

## 2025-07-17 DIAGNOSIS — E61.1 IRON DEFICIENCY: ICD-10-CM

## 2025-07-17 DIAGNOSIS — E55.9 VITAMIN D DEFICIENCY: ICD-10-CM

## 2025-07-17 PROCEDURE — 3008F BODY MASS INDEX DOCD: CPT | Performed by: INTERNAL MEDICINE

## 2025-07-17 PROCEDURE — 99204 OFFICE O/P NEW MOD 45 MIN: CPT | Performed by: INTERNAL MEDICINE

## 2025-07-17 PROCEDURE — 1036F TOBACCO NON-USER: CPT | Performed by: INTERNAL MEDICINE

## 2025-07-17 RX ORDER — LURASIDONE HYDROCHLORIDE 40 MG/1
TABLET, FILM COATED ORAL
COMMUNITY
Start: 2025-07-06

## 2025-07-17 ASSESSMENT — ENCOUNTER SYMPTOMS
FATIGUE: 1
TREMORS: 1
ROS SKIN COMMENTS: DRY SKIN
NERVOUS/ANXIOUS: 1

## 2025-07-17 NOTE — PATIENT INSTRUCTIONS
Thank you for choosing [facility]  for your health care needs.  If you have any questions, concerns or medical needs, please feel free to contact our office at [phone].    Please ensure you complete your blood work one week before the next scheduled appointment.    To continue Levothyroxine 200mcg po daily   Take levothyroxine on an empty stomach with water alone, 30-60 minutes before eating or taking other medications, 4 hours before any calcium or iron supplement.  To obtain blood tests today   Will follow up with results

## 2025-07-17 NOTE — LETTER
July 17, 2025     Laureano Souza MD  [address] [facility]  [place] [facility] [address]    Patient: Isaias Chamorro   YOB: 1980   Date of Visit: 7/17/2025       Dear Dr. Laureano Souza MD:    Thank you for referring Isaias Chamorro to me for evaluation. Below are my notes for this consultation.  If you have questions, please do not hesitate to call me. I look forward to following your patient along with you.       Sincerely,     Naomie Ramírez MD      CC: No Recipients  ______________________________________________________________________________________    Subjective   Isaias Chamorro is a 44 y.o. female who I am asked to see in consultation for evaluation of thyroid function.    The patient states that she was diagnosed in 2010.  She was stable on 75mcg po daily at that time.  She gained 40+ pounds.      She was diagnosed with Hodgkin's lymphoma and has had three recurrences in ten years.  She received chest radiation x 2, chemotherapy, stem cell therapy and immunologic agents.      Her previous PCP recommended cytomel but this was never commenced.      Current Regimen  Levothyroxine 200mcg po daily     Symptoms related to thyroid disease are as listed below:  Energy levels -  fatigued   Sleep -  for tubrinate reduction surgery; has not slept well in years; LELIA resolved following gastric bypass; not refreshing   Daytime naps - admits   Temperature intolerances -  chronically cold; thermostat set to 68 degrees  Bowel movements -  constipated; on Linzess; hard consistency; uses stool softener   Hair changes -  admits to shedding; started 3 years ago; saw dermatology who started spironolactone which was initially helpful but now is not   Nail changes - nail curling   Skin changes -  dry; complexion is dull; patches of discoloration   Palpitations - admits with anxiety or randomly   Tremors -admits since being on oxygen > 2 years ago   Weight changes -   "gastric bypass in 2020 and lost 100 pounds    Family  Aunt -Graves'   Grandmother - thyroid disease     She has been taking 5000 of Vitamin D though she has felt better on the prescribed once weekly dose.      She was on Keytruda and developed pneumonitis.  She was intubated during COVID and was on oxygen for one year.  She was on Prednisone 80mg for one year and gained 60 pounds.      She was started on Zepbound in February.  Her dose was increased to 7.5mg yesterday.  She has lost 40 pounds thus far.      Type 2 diabetes mellitus resolved following gastric bypass       Review of Systems   Constitutional:  Positive for fatigue.   HENT:          Dry  mouth    Skin:         Dry skin    Neurological:  Positive for tremors.   Psychiatric/Behavioral:  The patient is nervous/anxious.         Depression    All other systems reviewed and are negative.      Objective   /76   Pulse (!) 113   Ht (!) 1.473 m (4' 10\")   Wt 80.8 kg (178 lb 3.2 oz)   BMI 37.24 kg/m²    Physical Exam  Vitals and nursing note reviewed.   Constitutional:       General: She is not in acute distress.     Appearance: Normal appearance. She is obese.   HENT:      Head: Normocephalic and atraumatic.      Nose: Nose normal.      Mouth/Throat:      Mouth: Mucous membranes are moist.     Eyes:      Extraocular Movements: Extraocular movements intact.       Cardiovascular:      Rate and Rhythm: Normal rate and regular rhythm.   Pulmonary:      Effort: Pulmonary effort is normal.      Breath sounds: Normal breath sounds.     Musculoskeletal:         General: Normal range of motion.     Skin:     General: Skin is warm.     Neurological:      Mental Status: She is alert and oriented to person, place, and time.      Deep Tendon Reflexes: Reflexes normal.      Comments: No tremors to outstretched hands     Psychiatric:         Mood and Affect: Mood normal.         Lab Results   Component Value Date    TSH 4.07 02/06/2025    FREET4 1.00 03/12/2024 "       Assessment/Plan   44 year old female presents for the evaluation and further management of hypothyroidism.    Hypothyroidism  To continue Levothyroxine 200mcg po daily   Take levothyroxine on an empty stomach with water alone, 30-60 minutes before eating or taking other medications, 4 hours before any calcium or iron supplement.  To obtain blood tests today       Vitamin D deficiency  To obtain a Vitamin D level     Iron deficiency  To obtain iron panel     Hair loss  To obtain blood tests today    Will follow up with results   16-Dec-2017 04:37 18-Dec-2017 19-Dec-2017 16-Dec-2017 05:00

## 2025-07-17 NOTE — PROGRESS NOTES
Subjective   Isaias Chamorro is a 44 y.o. female who I am asked to see in consultation for evaluation of thyroid function.    The patient states that she was diagnosed in 2010.  She was stable on 75mcg po daily at that time.  She gained 40+ pounds.      She was diagnosed with Hodgkin's lymphoma and has had three recurrences in ten years.  She received chest radiation x 2, chemotherapy, stem cell therapy and immunologic agents.      Her previous PCP recommended cytomel but this was never commenced.      Current Regimen  Levothyroxine 200mcg po daily     Symptoms related to thyroid disease are as listed below:  Energy levels -  fatigued   Sleep -  for tubrinate reduction surgery; has not slept well in years; LELIA resolved following gastric bypass; not refreshing   Daytime naps - admits   Temperature intolerances -  chronically cold; thermostat set to 68 degrees  Bowel movements -  constipated; on Linzess; hard consistency; uses stool softener   Hair changes -  admits to shedding; started 3 years ago; saw dermatology who started spironolactone which was initially helpful but now is not   Nail changes - nail curling   Skin changes -  dry; complexion is dull; patches of discoloration   Palpitations - admits with anxiety or randomly   Tremors -admits since being on oxygen > 2 years ago   Weight changes -  gastric bypass in 2020 and lost 100 pounds    Family  Aunt -Graves'   Grandmother - thyroid disease     She has been taking 5000 of Vitamin D though she has felt better on the prescribed once weekly dose.      She was on Keytruda and developed pneumonitis.  She was intubated during COVID and was on oxygen for one year.  She was on Prednisone 80mg for one year and gained 60 pounds.      She was started on Zepbound in February.  Her dose was increased to 7.5mg yesterday.  She has lost 40 pounds thus far.      Type 2 diabetes mellitus resolved following gastric bypass       Review of Systems   Constitutional:   "Positive for fatigue.   HENT:          Dry  mouth    Skin:         Dry skin    Neurological:  Positive for tremors.   Psychiatric/Behavioral:  The patient is nervous/anxious.         Depression    All other systems reviewed and are negative.      Objective   /76   Pulse (!) 113   Ht (!) 1.473 m (4' 10\")   Wt 80.8 kg (178 lb 3.2 oz)   BMI 37.24 kg/m²    Physical Exam  Vitals and nursing note reviewed.   Constitutional:       General: She is not in acute distress.     Appearance: Normal appearance. She is obese.   HENT:      Head: Normocephalic and atraumatic.      Nose: Nose normal.      Mouth/Throat:      Mouth: Mucous membranes are moist.     Eyes:      Extraocular Movements: Extraocular movements intact.       Cardiovascular:      Rate and Rhythm: Normal rate and regular rhythm.   Pulmonary:      Effort: Pulmonary effort is normal.      Breath sounds: Normal breath sounds.     Musculoskeletal:         General: Normal range of motion.     Skin:     General: Skin is warm.     Neurological:      Mental Status: She is alert and oriented to person, place, and time.      Deep Tendon Reflexes: Reflexes normal.      Comments: No tremors to outstretched hands     Psychiatric:         Mood and Affect: Mood normal.         Lab Results   Component Value Date    TSH 4.07 02/06/2025    FREET4 1.00 03/12/2024       Assessment/Plan   44 year old female presents for the evaluation and further management of hypothyroidism.    Hypothyroidism  To continue Levothyroxine 200mcg po daily   Take levothyroxine on an empty stomach with water alone, 30-60 minutes before eating or taking other medications, 4 hours before any calcium or iron supplement.  To obtain blood tests today       Vitamin D deficiency  To obtain a Vitamin D level     Iron deficiency  To obtain iron panel     Hair loss  To obtain blood tests today    Will follow up with results    "

## 2025-07-18 LAB
25(OH)D3+25(OH)D2 SERPL-MCNC: 24 NG/ML (ref 30–100)
GLIADIN IGA SER IA-ACNC: <1 U/ML
GLIADIN IGG SER IA-ACNC: <1 U/ML
IGA SERPL-MCNC: 89 MG/DL (ref 47–310)
IRON SATN MFR SERPL: 15 % (CALC) (ref 16–45)
IRON SERPL-MCNC: 64 MCG/DL (ref 40–190)
T3 SERPL-MCNC: 81 NG/DL (ref 76–181)
T4 FREE SERPL-MCNC: 1.5 NG/DL (ref 0.8–1.8)
THYROPEROXIDASE AB SERPL-ACNC: 16 IU/ML
TIBC SERPL-MCNC: 428 MCG/DL (CALC) (ref 250–450)
TSH SERPL-ACNC: 0.02 MIU/L
TTG IGA SER-ACNC: <1 U/ML
TTG IGG SER-ACNC: <1 U/ML

## 2025-07-18 NOTE — ASSESSMENT & PLAN NOTE
To continue Levothyroxine 200mcg po daily   Take levothyroxine on an empty stomach with water alone, 30-60 minutes before eating or taking other medications, 4 hours before any calcium or iron supplement.  To obtain blood tests today

## 2025-07-22 ENCOUNTER — APPOINTMENT (OUTPATIENT)
Dept: PALLIATIVE MEDICINE | Facility: HOSPITAL | Age: 45
End: 2025-07-22
Payer: COMMERCIAL

## 2025-07-22 ENCOUNTER — TELEPHONE (OUTPATIENT)
Dept: HEMATOLOGY/ONCOLOGY | Facility: CLINIC | Age: 45
End: 2025-07-22

## 2025-07-22 VITALS
OXYGEN SATURATION: 100 % | DIASTOLIC BLOOD PRESSURE: 74 MMHG | HEART RATE: 108 BPM | SYSTOLIC BLOOD PRESSURE: 111 MMHG | WEIGHT: 179.9 LBS | RESPIRATION RATE: 18 BRPM | TEMPERATURE: 97.5 F | BODY MASS INDEX: 37.6 KG/M2

## 2025-07-22 DIAGNOSIS — K59.03 CONSTIPATION DUE TO PAIN MEDICATION THERAPY: Primary | ICD-10-CM

## 2025-07-22 DIAGNOSIS — G62.9 POLYNEUROPATHY: ICD-10-CM

## 2025-07-22 DIAGNOSIS — F41.9 ANXIETY: ICD-10-CM

## 2025-07-22 DIAGNOSIS — G47.09 OTHER INSOMNIA: ICD-10-CM

## 2025-07-22 DIAGNOSIS — Z51.5 ENCOUNTER FOR PALLIATIVE CARE: ICD-10-CM

## 2025-07-22 PROCEDURE — 99214 OFFICE O/P EST MOD 30 MIN: CPT | Performed by: NURSE PRACTITIONER

## 2025-07-22 RX ORDER — LORAZEPAM 0.5 MG/1
0.5 TABLET ORAL 3 TIMES DAILY PRN
Qty: 90 TABLET | Refills: 2 | Status: SHIPPED | OUTPATIENT
Start: 2025-07-22 | End: 2025-10-20

## 2025-07-22 RX ORDER — TOPIRAMATE 100 MG/1
300 TABLET, FILM COATED ORAL NIGHTLY
Qty: 30 TABLET | Refills: 2 | Status: SHIPPED | OUTPATIENT
Start: 2025-07-22 | End: 2025-08-21

## 2025-07-22 ASSESSMENT — PAIN SCALES - GENERAL: PAINLEVEL_OUTOF10: 6

## 2025-07-22 NOTE — PROGRESS NOTES
[facility]ALL[facility]AT[facility]OG[facility] OUTPATIENT FOLLOW-UP    SERVICE DATE: 11/20/2024    Subjective   HISTORY OF PRESENT ILLNESS: Isaias Chamorro is a 44 y.o. female who presents with past medical history of Hodgkin's Lymphoma, originally diagnosed June 2013, with recent relapse in March of 2021.  She received RT therapy to paraspinal mass, was pursuing Pembrolizumab but developed pneumonitis. Currently on surveillance.   Patient follows with Supportive Oncology for pain and further symptom management.       2/14/25: Pain stable. Having issues with fatigue. Sleeping 16-18hrs per day. This started 2 months ago. PCP is working this up.     5/9/25: Has upcoming sinus surgery 8/4. Seeing neurology for migraines. Fatigue is better overall.     7/22/25: Having bad facial pain today due to sinus issues. Surgery 8/4. Evaluated by neurology. MRI brain unremarkable. She notes she has some jitteriness and low TSH. Follows with endo. She notes recent EGD for louis's. On PPI. Also had colonoscopy that she reports was unremarkable.     Pain Assessment:    Moderate /neck and left shoulder / radiating to arm to elbow - from struggling to breath per ENT    Symptom Assessment:  Pain:a little   Numbness or Tingling in hands/feet/other: none  Sore Muscles/Spasms: none  Headache: a little   Constipation: a little - linzess 145 mcg, colace   Diarrhea: none  Nausea: none  Vomiting: none  Lack of Appetite: none   Weight Loss: a little - intentional   Lack of Energy: a little   Difficulty Sleeping: a little  Worrying: none  Anxiety: a little  Depression: a little  Shortness of breath: none  Other: none      Information obtained from: chart review and interview of patient  ______________________________________________________________________        Objective     Creatinine   Date Value Ref Range Status   05/09/2025 0.69 0.50 - 1.05 mg/dL Final     CREATININE   Date Value Ref Range Status   02/06/2025 0.65 0.50 - 0.99 mg/dL Final  "    AST   Date Value Ref Range Status   05/09/2025 15 9 - 39 U/L Final   02/06/2025 17 10 - 30 U/L Final     ALT   Date Value Ref Range Status   05/09/2025 11 7 - 45 U/L Final     Comment:     Patients treated with Sulfasalazine may generate falsely decreased results for ALT.   02/06/2025 14 6 - 29 U/L Final     Hemoglobin   Date Value Ref Range Status   05/09/2025 12.7 12.0 - 16.0 g/dL Final     HEMOGLOBIN   Date Value Ref Range Status   02/06/2025 12.0 11.7 - 15.5 g/dL Final     Platelets   Date Value Ref Range Status   05/09/2025 184 150 - 450 x10*3/uL Final     PLATELET COUNT   Date Value Ref Range Status   02/06/2025 186 140 - 400 Thousand/uL Final       PHYSICAL EXAMINATION   Vital Signs:       4/15/2025    12:46 PM 5/1/2025     9:18 AM 5/9/2025    10:43 AM 5/13/2025     9:00 AM 6/4/2025    12:49 PM 7/17/2025     1:56 PM 7/22/2025    11:49 AM   Vitals   Systolic  100 109 102  108 111   Diastolic  69 65 71  76 74   BP Location   Left arm    Left arm   Heart Rate  92 93 91  113 108   Temp  36.5 °C (97.7 °F) 36.3 °C (97.3 °F)    36.4 °C (97.5 °F)   Resp  16 18    18   Height 1.473 m (4' 10\") 1.473 m (4' 10\")    1.473 m (4' 10\")    Weight (lb) 203 194 189.82  182 178.2 179.9   BMI 42.43 kg/m2 40.55 kg/m2 39.67 kg/m2  38.04 kg/m2 37.24 kg/m2 37.6 kg/m2   BSA (m2) 1.94 m2 1.9 m2 1.88 m2  1.84 m2 1.82 m2 1.83 m2   Visit Report Report Report Report    Report Report  Report Report             Physical Exam  Vitals reviewed.   Constitutional:       Appearance: Normal appearance.   HENT:      Head: Normocephalic.   Pulmonary:      Effort: Pulmonary effort is normal.   Abdominal:      General: Abdomen is flat.     Musculoskeletal:         General: Normal range of motion.     Neurological:      General: No focal deficit present.      Mental Status: She is alert.     Psychiatric:         Mood and Affect: Mood normal.         Thought Content: Thought content normal.       ASSESSMENT/PLAN    Pain  Pain is: chronic - post " cancer therapy and 2/2 sinus issues/ headaches   Type: somatic and neuropathic  Home regimen:   - Continue Methadone 2.5/2.5/5mg   - EKG (12/29/23) . EKG (11/20/2024) . Repeat yearly.   - Continue Oxycodone 2.5-5mg [id number] PRN  - Continue Topiramate 300mg at bedtime   - Continue flexeril 5mg nightly prn per ENT  - Continue nurtec per PCP  - Never too Ubrelvy per PCP  - Integrative med referral already placed for massage  - Patient is very open to weaning opioids after her surgery on 8/4  - FU neurology as needed     Intolerances/previously tried:   - Unable to take NSAIDs d/t bariatric surgery and Abreu's Esophagus  - Gabapentin - did not tolerate - excessive weight gain  - Pregabalin - did not tolerate  - Duloxetine - did not tolerate in past  - D/C Nortriptyline 10mg once daily at bed due to concerns of serotonin syndrome      Opioid Use  Medication Management:   - OARRS report reviewed with no aberrant behavior; consistent with  prescriptions/records and patient history  - MED 15 + 5mg methadone.  Overdose Risk Score 510.   This has been discussed with patient.   - We will continue to closely monitor the patient for signs of prescription misuse including UDS, OARRS review and subjective reports at each visit.  - Concurrent benzodiazepine use with lorazepam, educated on risks.  - I am a provider who either is or has consulted and collaborated with a provider certified in Hospice and Palliative Medicine and have conducted a face-face visit and examination for this patient.  - Routine Urine Drug Screen: 5/9/25 - appropriate   - Controlled Substance Agreement: 5/9/25  - Specifically discussed that controlled substance prescriptions will only be provided by our group as outlined in the completed agreement  - Prescribed naloxone: 5/6/22; offer renewal at next visit   - Red Flags: none     Constipation (Chronic in nature)  At risk for constipation related to opioids.  Home regimen:   - Following with  Gastro  - Continue Senna-S PRN and fiber gummy prn   - Continue linzess 145mcg daily      Nausea (improved)  - Continue Ondansetron 8mg [id number] PRN  - Continue Lorazepam 0.5mg, 1/2-1 tab q8 hours PRN - open to weaning at next OV     Altered Mood/Insomnia   Chronic anxiety and depression related to hx of bipolar disorder?  Home regimen:   - Managed by outside provider   - Following with trauma counselor  - Pursuing acupuncture/massage  - Continue Lamictal 100mg daily  - Continue Sertraline 100mg daily  - Continue latuda 40 mg daily   - Continue Lorazepam 0.5mg, 1/2 to 1 tablet [id number] PRN for anxiety    Next Follow-Up Visit:  Return to clinic in 3 months     Signature and billing  Medical complexity was moderate level due to due to complexity of problems, extensive data review, and high risk of management/treatment.  Time was spent on the following: Prep Time, Time Directly with Patient/Family/Caregiver, Documentation Time. Total time spent: 30      Data  Diagnostic tests and information reviewed for today's visit:  Most recent labs and imaging results, Medications     Some elements copied from Palliative Care note on 5/9/25, the elements have been updated and all reflect current decision making from today, 7/22/25.    Plan of Care discussed with: Patient and RN    SIGNATURE: MORRO Shahid-CNP    Contact information:  [facility]  Monday-Friday 8 AM-5 PM  Phone:  [phone], press option #5, then option #1.   Or Epic Secure Chat

## 2025-07-22 NOTE — TELEPHONE ENCOUNTER
Spoke with pharmacist at [facility] ( Karlie) who wanted to be make certain that the Topiramate is being changed from 100 mg TID to 300mg at bedtime.  Reviewed EMR and confirmed this .  Questions answered to her satisfaction.

## 2025-07-25 ENCOUNTER — APPOINTMENT (OUTPATIENT)
Dept: PALLIATIVE MEDICINE | Facility: HOSPITAL | Age: 45
End: 2025-07-25
Payer: COMMERCIAL

## 2025-07-28 ENCOUNTER — TELEMEDICINE (OUTPATIENT)
Dept: NEUROLOGY | Facility: HOSPITAL | Age: 45
End: 2025-07-28
Payer: COMMERCIAL

## 2025-07-28 ENCOUNTER — SPECIALTY PHARMACY (OUTPATIENT)
Dept: PHARMACY | Facility: CLINIC | Age: 45
End: 2025-07-28

## 2025-07-28 DIAGNOSIS — G43.101 MIGRAINE WITH AURA AND WITH STATUS MIGRAINOSUS, NOT INTRACTABLE: ICD-10-CM

## 2025-07-28 DIAGNOSIS — M54.2 NECK PAIN: ICD-10-CM

## 2025-07-28 DIAGNOSIS — R20.0 FACIAL NUMBNESS: ICD-10-CM

## 2025-07-28 DIAGNOSIS — G43.109 MIGRAINE WITH AURA AND WITHOUT STATUS MIGRAINOSUS, NOT INTRACTABLE: Primary | ICD-10-CM

## 2025-07-28 PROCEDURE — 1036F TOBACCO NON-USER: CPT | Performed by: PSYCHIATRY & NEUROLOGY

## 2025-07-28 PROCEDURE — 99214 OFFICE O/P EST MOD 30 MIN: CPT | Performed by: PSYCHIATRY & NEUROLOGY

## 2025-07-28 NOTE — PATIENT INSTRUCTIONS
Follow-up on [facility] referral  Continue topiramate/Nurtec every other day c/o prescriber  3.   [facility]    Rtc 6 mo

## 2025-07-28 NOTE — PROGRESS NOTES
Follow-up visit    Visit type: Virtual follow-up    PCP: Laureano Souza MD.    Subjective     Isaias Chamorro is a 44 y.o. year old female here for virtual follow-up. Last seen 5/13/2025.    Patient is accompanied by none.     Virtual or Telephone Consent    An interactive audio and video telecommunication system which permits real time communications between the patient (at the originating site) and provider (at the distant site) was utilized to provide this telehealth service.   Verbal consent was requested and obtained from Isaias Chamorro on this date, 07/28/25 for a telehealth visit and the patient's location was confirmed at the time of the visit.     HPI    I first saw her 5/13/25 for L facial paresthesias, ? Migraine. States here having 2-fold problem. 1 is having left face pain/numbness, thought to be possibly related to allergies and sinuses. Also feel there's migraine component. Describes pain/numb that goes up left side of head/forehead/jaw/cheek/tip of nose. 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 States above symptoms seemed to have started with this. Facial pain progressively getting worse. No throbbing. (+) light/sound sensitivity. (-) n/v. Also has pain in back of neck >1 year. HA 3-4x/week. Back of neck pain/pressure constant. No recent neuro consult. Saw neuro when much younger for RLS etc.  S/p GBP--wt gain  S/p amitriptyline--wt gain  Nurtec started 2 weeks ago by PCP, seemed to have lessened the pain some.  ENT wants to do turbinate resection, and was telling her likely to help with some of the facial symptoms she is having. (+) asthma. No kidney stone. No heart problem. No glaucoma. (+) eye check recently, no issues contributing to HA per pt. No brain scan. On LTG 100mg daily, methadone (1 year, prior was on fentanyl)--for chronic pain (related to tumor), oxycodone prn  On Nurtec every other day--just started 2 weeks ago  On topiramate 300mg at  bedtime--for nerve pain related to brachial plexus  Was given samples for Ubrelvy--never took it, never had to  No smoking. No alcohol. No street drug use.  During last visit, discussed do MRI brain wwo, and [facility] referral to take care of neck/trapezius.    Since last visit, MRI brain wwo was reported normal.    Here for followup.    Trapezius pain still there. Her other doctors told her maybe ? Related to forceful breathing in. Having turbinate surgery next Monday and hoping it will help.    Has not followed through with [facility] referral yet.    Has no Ubrelvy. Ran out. Wishes she has. She is on samples. States insurance previously denied.    Has tried Imitrex/Maxalt in past--helped but didn't help much (was on in her 20s)  Has tried Midrin--worked, but not available anymore      Problem List[1]    Allergies[2]  Current Medications[3]     Objective     There were no vitals taken for this visit.       Awake, alert, oriented x3, in no distress  Well-nourished, seated    Mental status exam as above, conversant   Full EOMs intact, no nystagmus, no ptosis   No facial droop   Hearing grossly intact   No dysarthria    I did not have her stand or walk      Lab Results   Component Value Date    WBC 5.5 05/09/2025    RBC 4.49 05/09/2025    HGB 12.7 05/09/2025    HCT 38.4 05/09/2025     05/09/2025     05/09/2025    K 3.9 05/09/2025     05/09/2025    BUN 24 (H) 05/09/2025    CREATININE 0.69 05/09/2025    EGFR >90 05/09/2025    CALCIUM 8.7 05/09/2025    ALKPHOS 80 05/09/2025    AST 15 05/09/2025    ALT 11 05/09/2025    MG 2.13 08/05/2024    HMUMRXEP29 392 02/06/2025    VITD25 24 (L) 07/17/2025    HGBA1C 5.1 05/09/2025    LDLCALC 89 02/06/2025    CHOL 159 02/06/2025    HDL 50 02/06/2025    TRIG 106 02/06/2025    TSH 0.02 (L) 07/17/2025        MR brain w and wo IV contrast 06/04/2025    Narrative  Interpreted By:  Nya Ogden,  STUDY:  MR BRAIN W AND WO IV CONTRAST;  6/4/2025 2:09  pm    INDICATION:  Signs/Symptoms:hx lymphoma in L brachial plexus, having back of head  pain/L head/face numbness.    ,R20.0 Anesthesia of skin    COMPARISON:  None.    ACCESSION NUMBER(S):  [id number]    ORDERING CLINICIAN:  LOGAN ORTEGA    TECHNIQUE:  Axial T2, FLAIR, DWI, gradient echo T2 and sagittal and coronal T1  weighted images of brain were acquired. Post contrast T1 weighted  images were acquired after administration of 16 ML Dotarem gadolinium  based intravenous contrast.    FINDINGS:  CSF Spaces: The ventricles, sulci and basal cisterns are within  normal limits.    Parenchyma: There is no diffusion restriction abnormality to suggest  acute infarct.  There is no focal parenchymal signal abnormality.  There is no mass effect or midline shift.    Paranasal Sinuses and Mastoids: Visualized paranasal sinuses and  mastoid air cells are unremarkable.    Impression  No evidence of acute infarct, intracranial mass effect or midline  shift.    MACRO:  None    Signed by: Nya Ogden 6/6/2025 8:41 PM  Dictation workstation:   [id number]    Assessment/Plan     L facial paresthesias  Symptoms apparently started with Hodgkin's around 2001 and has been present for some time, worsening recently  I am unsure pt does have component of migraine--though states Nurtec every other day seems helping  2025 MRI brain wwo normal  Pt already on topiramate 300mg at bedtime for pain as well     2. Migraine with aura  Currently on topiramate/Nurtec every other day by other providers  No prn med--ran out of Ubrelvy samples  Pt s/p sumatriptan and rizatriptan in past  I think Ubrelvy prn is a reasonable option--erx'd    3.   Bilateral posterior neck/trapezius spasm/pain  Unclear if tumor has any contribution to this  Pt wants massage therapy referral thru [facility]--previously ordered     4. Chronic pain related to tumor/L brachial plexus issue  Seeing palliative oncology       Plans:  Follow-up on [facility]  referral  Continue topiramate/Nurtec every other day c/o prescriber  3.   [facility]    Rtc 6 mo    All questions were answered.  Pt knows how to contact my office in case pt has any questions or concerns.    Mele Sheehan MD            [1]   Patient Active Problem List  Diagnosis    Allergic rhinitis    Anxiety    Arthritis    ASCUS with positive high risk HPV cervical    Abreu esophagus    Bipolar affective disorder (Multi)    Cervical radiculopathy    Neck pain    Chronic constipation    CHRISTINA III (cervical intraepithelial neoplasia grade III) with severe dysplasia    Hirsutism    HLD (hyperlipidemia)    Hodgkins lymphoma (Multi)    Hypothyroidism    IUD (intrauterine device) in place    Mild intermittent asthma    PCOS (polycystic ovarian syndrome)    S/P bariatric surgery    Seasonal allergies    Vitamin D deficiency    Anxiety and depression    Chronic hypokalemia    Eczema    Hypertrophic scar    Asthma    Cough variant asthma (HHS-HCC)    Intestinal malabsorption    Intractable neuropathic pain of hand    Polyneuropathy    Splenic infarction    Status post bone marrow transplant (Multi)    Thoracic degenerative disc disease    Urinary, incontinence, stress female    Uvular swelling    Vocal cord dysfunction    Fibromyalgia    Anemia in other chronic diseases classified elsewhere    Mediastinal mass    Abnormal fasting glucose    Facial numbness    Hair loss    Iron deficiency    Migraine with aura and without status migrainosus, not intractable   [2]   Allergies  Allergen Reactions    Cephalexin Anaphylaxis, Rash and Unknown    Keytruda [Pembrolizumab] Anaphylaxis    Penicillins Anaphylaxis, Rash and Unknown    Lactose Unknown    Latex Hives and Unknown    Sulfa (Sulfonamide Antibiotics) Other and Unknown    Sulfamethoxazole Unknown    Azithromycin Rash    Clarithromycin Rash and Unknown    Erythromycin Base Rash    Macrolide Antibiotics Rash   [3]   Current Outpatient  Medications:     albuterol 90 mcg/actuation inhaler, Inhale 2 puffs every 4 hours if needed for wheezing., Disp: 18 g, Rfl: 11    calcium cit-mag-D3-Zn--maxine (Calcium Citrate Plus) 250 mg-40 mg- 125 unit-3.75mg tablet, Take 1 tablet by mouth once daily., Disp: 90 tablet, Rfl: 3    cholecalciferol (Vitamin D3) 5,000 Units tablet, Take 1 tablet (5,000 Units) by mouth once daily., Disp: 90 tablet, Rfl: 3    cyanocobalamin (Vitamin B-12) 1,000 mcg tablet, Take 1 tablet (1,000 mcg) by mouth once daily., Disp: 90 tablet, Rfl: 3    cyclobenzaprine (Flexeril) 5 mg tablet, Take 1 tablet (5 mg) by mouth as needed at bedtime for muscle spasms. (Patient taking differently: Take 1 tablet (5 mg) by mouth as needed at bedtime for muscle spasms. 1-2 tabs prn hs), Disp: , Rfl:     estradiol (Vivelle-DOT) 0.0375 mg/24 hr, Place 1 patch over 96 hours on the skin 2 times a week., Disp: 8 patch, Rfl: 11    lamoTRIgine (LaMICtal) 100 mg tablet, Take 1 tablet (100 mg) by mouth once daily., Disp: , Rfl:     levonorgestrel (Mirena) 21 mcg/24 hours (8 yrs) 52 mg IUD, Intrauterine, Disp: , Rfl:     levothyroxine (Synthroid, Levoxyl) 200 mcg tablet, Take 1 tablet daily for 6 days per week, Disp: 90 tablet, Rfl: 3    linaCLOtide (Linzess) 145 mcg capsule, Take 1 capsule (145 mcg) by mouth once daily in the morning. Take before meals. Do not crush or chew., Disp: 90 capsule, Rfl: 3    LORazepam (Ativan) 0.5 mg tablet, Take 1 tablet (0.5 mg) by mouth 3 times a day as needed for anxiety., Disp: 90 tablet, Rfl: 2    lurasidone (Latuda) 40 mg tablet, , Disp: , Rfl:     methadone (Dolophine) 5 mg tablet, Take 0.5 tablets (2.5 mg) by mouth once daily in the morning AND 0.5 tablets (2.5 mg) once daily in the evening AND 1 tablet (5 mg) once daily at bedtime. Do not fill before July 10, 2025., Disp: 60 tablet, Rfl: 0    multivitamin tablet, Take 1 tablet by mouth once daily., Disp: 30 tablet, Rfl: 11    naloxone (Narcan) 4 mg/0.1 mL nasal spray,  ADMINISTER A SINGLE SPRAY INTRANASALLY INTO ONE NOSTRIL. CALL 911. MAY REPEAT X 1., Disp: , Rfl:     omeprazole (PriLOSEC) 40 mg DR capsule, Take 1 capsule (40 mg) by mouth once daily. Take one pill in the morning on empty stomach, 30-45 minutes before eating. Do not crush or chew., Disp: 90 capsule, Rfl: 1    oxyCODONE (Roxicodone) 5 mg immediate release tablet, Take 1 tablet (5 mg) by mouth every 12 hours if needed for severe pain (7 - 10). Do not fill before July 10, 2025., Disp: 60 tablet, Rfl: 0    rimegepant (NURTEC) 75 mg tablet,disintegrating, Dissolve 1 tablet (75 mg) in the mouth every other day., Disp: 16 tablet, Rfl: 11    sertraline (Zoloft) 100 mg tablet, Take 1 tablet (100 mg) by mouth once daily., Disp: , Rfl:     tirzepatide, weight loss, (Zepbound) 7.5 mg/0.5 mL injection, Inject 7.5 mg under the skin every 7 days., Disp: 4 each, Rfl: 1    topiramate (Topamax) 100 mg tablet, Take 3 tablets (300 mg) by mouth once daily at bedtime., Disp: 30 tablet, Rfl: 2    biotin 10,000 mcg capsule, Take 1 capsule (10 mg) by mouth once daily. (Patient not taking: Reported on 7/24/2025), Disp: 90 capsule, Rfl: 3    ferrous sulfate 325 (65 Fe) MG EC tablet, Take 1 tablet by mouth once daily with breakfast. (Patient not taking: Reported on 5/13/2025), Disp: 90 tablet, Rfl: 3    ubrogepant (Ubrelvy) 100 mg tablet, Take 1 tablet (100 mg) by mouth 1 time if needed (migraine). Take 1 tab as needed to abort migraine, can take another dose x1 after 2 hours if needed, Disp: 10 tablet, Rfl: 5

## 2025-08-02 ENCOUNTER — PATIENT MESSAGE (OUTPATIENT)
Dept: PALLIATIVE MEDICINE | Facility: HOSPITAL | Age: 45
End: 2025-08-02
Payer: COMMERCIAL

## 2025-08-02 DIAGNOSIS — G62.9 POLYNEUROPATHY: ICD-10-CM

## 2025-08-02 DIAGNOSIS — R11.2 NAUSEA AND VOMITING, UNSPECIFIED VOMITING TYPE: ICD-10-CM

## 2025-08-04 RX ORDER — TOPIRAMATE 100 MG/1
300 TABLET, FILM COATED ORAL NIGHTLY
Qty: 90 TABLET | Refills: 2 | Status: SHIPPED | OUTPATIENT
Start: 2025-08-04 | End: 2025-09-03

## 2025-08-04 RX ORDER — ONDANSETRON 8 MG/1
8 TABLET, FILM COATED ORAL 3 TIMES DAILY PRN
Qty: 90 TABLET | Refills: 0 | Status: SHIPPED | OUTPATIENT
Start: 2025-08-04 | End: 2025-09-03

## 2025-08-04 NOTE — TELEPHONE ENCOUNTER
Patient last seen by MORRO Quan on 7/22 with plan to continue zofran 8mg q8h PRN and topiramate 300mg at bedtime. Follow up visit is scheduled for 10/17. Prescriptions pended to provider for approval.

## 2025-08-05 ENCOUNTER — PATIENT MESSAGE (OUTPATIENT)
Dept: PALLIATIVE MEDICINE | Facility: HOSPITAL | Age: 45
End: 2025-08-05
Payer: COMMERCIAL

## 2025-08-05 DIAGNOSIS — G89.3 CANCER RELATED PAIN: ICD-10-CM

## 2025-08-05 RX ORDER — OXYCODONE HYDROCHLORIDE 5 MG/1
5-10 TABLET ORAL EVERY 4 HOURS PRN
Qty: 42 TABLET | Refills: 0 | Status: SHIPPED | OUTPATIENT
Start: 2025-08-05 | End: 2025-08-12

## 2025-08-05 NOTE — TELEPHONE ENCOUNTER
OARRS report reviewed and reflects  prescription history.  Per OARRS, patient last filled 30 day supply oxycodone on 7/11. Per last visit with Radha Quan on 7/22 patient to continue oxycodone q12h prn. Patient had turbinate sinus surgery scheduled and is experiencing pain post surgery with required nasal flushes. Patient requires increased in pain medication in the short term while she is recovering from the surgery.  Per covering provider patient can take 1-2 tabs oxycodone q4h prn for 1 week. Patient with follow up visit scheduled with Radha Quan on 10/17. Patient updated that medication will be sent to [facility]. Refill request routed to covering provider.

## 2025-08-11 ENCOUNTER — TELEPHONE (OUTPATIENT)
Dept: HEMATOLOGY/ONCOLOGY | Facility: HOSPITAL | Age: 45
End: 2025-08-11
Payer: COMMERCIAL

## 2025-08-11 DIAGNOSIS — G89.3 CHRONIC PAIN DUE TO NEOPLASM: ICD-10-CM

## 2025-08-11 DIAGNOSIS — G89.3 CANCER RELATED PAIN: ICD-10-CM

## 2025-08-11 RX ORDER — METHADONE HYDROCHLORIDE 5 MG/1
TABLET ORAL
Qty: 60 TABLET | Refills: 0 | Status: SHIPPED | OUTPATIENT
Start: 2025-08-11 | End: 2025-09-10

## 2025-08-11 RX ORDER — OXYCODONE HYDROCHLORIDE 5 MG/1
5 TABLET ORAL EVERY 6 HOURS PRN
Qty: 28 TABLET | Refills: 0 | Status: SHIPPED | OUTPATIENT
Start: 2025-08-11 | End: 2025-08-18

## 2025-08-18 ENCOUNTER — APPOINTMENT (OUTPATIENT)
Dept: PULMONOLOGY | Facility: HOSPITAL | Age: 45
End: 2025-08-18
Payer: COMMERCIAL

## 2025-08-18 VITALS
HEIGHT: 59 IN | SYSTOLIC BLOOD PRESSURE: 119 MMHG | HEART RATE: 104 BPM | OXYGEN SATURATION: 99 % | WEIGHT: 177.1 LBS | TEMPERATURE: 97.2 F | RESPIRATION RATE: 16 BRPM | BODY MASS INDEX: 35.7 KG/M2 | DIASTOLIC BLOOD PRESSURE: 87 MMHG

## 2025-08-18 DIAGNOSIS — J30.9 ALLERGIC RHINITIS, UNSPECIFIED SEASONALITY, UNSPECIFIED TRIGGER: ICD-10-CM

## 2025-08-18 DIAGNOSIS — J45.909 ASTHMA, UNSPECIFIED ASTHMA SEVERITY, UNSPECIFIED WHETHER COMPLICATED, UNSPECIFIED WHETHER PERSISTENT (HHS-HCC): ICD-10-CM

## 2025-08-18 DIAGNOSIS — C81.90 HODGKIN LYMPHOMA, UNSPECIFIED HODGKIN LYMPHOMA TYPE, UNSPECIFIED BODY REGION (MULTI): ICD-10-CM

## 2025-08-18 DIAGNOSIS — R06.02 SHORTNESS OF BREATH: Primary | ICD-10-CM

## 2025-08-18 PROCEDURE — 3008F BODY MASS INDEX DOCD: CPT | Performed by: NURSE PRACTITIONER

## 2025-08-18 PROCEDURE — 99204 OFFICE O/P NEW MOD 45 MIN: CPT | Performed by: NURSE PRACTITIONER

## 2025-08-18 PROCEDURE — 1036F TOBACCO NON-USER: CPT | Performed by: NURSE PRACTITIONER

## 2025-08-18 PROCEDURE — 99213 OFFICE O/P EST LOW 20 MIN: CPT

## 2025-08-18 RX ORDER — ALBUTEROL SULFATE 0.83 MG/ML
3 SOLUTION RESPIRATORY (INHALATION) ONCE
OUTPATIENT
Start: 2025-08-18 | End: 2025-08-18

## 2025-08-18 RX ORDER — ALBUTEROL SULFATE 90 UG/1
4 INHALANT RESPIRATORY (INHALATION) ONCE
OUTPATIENT
Start: 2025-08-18

## 2025-08-18 RX ORDER — BUDESONIDE AND FORMOTEROL FUMARATE DIHYDRATE 160; 4.5 UG/1; UG/1
2 AEROSOL RESPIRATORY (INHALATION)
Qty: 10.2 G | Refills: 11 | Status: SHIPPED | OUTPATIENT
Start: 2025-08-18

## 2025-08-18 ASSESSMENT — COLUMBIA-SUICIDE SEVERITY RATING SCALE - C-SSRS
6. HAVE YOU EVER DONE ANYTHING, STARTED TO DO ANYTHING, OR PREPARED TO DO ANYTHING TO END YOUR LIFE?: NO
1. IN THE PAST MONTH, HAVE YOU WISHED YOU WERE DEAD OR WISHED YOU COULD GO TO SLEEP AND NOT WAKE UP?: NO
2. HAVE YOU ACTUALLY HAD ANY THOUGHTS OF KILLING YOURSELF?: NO

## 2025-08-18 ASSESSMENT — ENCOUNTER SYMPTOMS
OCCASIONAL FEELINGS OF UNSTEADINESS: 0
FATIGUE: 0
SHORTNESS OF BREATH: 1
FEVER: 0
RHINORRHEA: 0
UNEXPECTED WEIGHT CHANGE: 0
COUGH: 1
WHEEZING: 0
CHILLS: 0
DEPRESSION: 0
LOSS OF SENSATION IN FEET: 0

## 2025-08-18 ASSESSMENT — PATIENT HEALTH QUESTIONNAIRE - PHQ9
SUM OF ALL RESPONSES TO PHQ9 QUESTIONS 1 AND 2: 0
2. FEELING DOWN, DEPRESSED OR HOPELESS: NOT AT ALL
1. LITTLE INTEREST OR PLEASURE IN DOING THINGS: NOT AT ALL

## 2025-08-19 ENCOUNTER — TELEPHONE (OUTPATIENT)
Dept: ADMISSION | Facility: HOSPITAL | Age: 45
End: 2025-08-19
Payer: COMMERCIAL

## 2025-08-19 DIAGNOSIS — G89.3 CANCER RELATED PAIN: ICD-10-CM

## 2025-08-19 RX ORDER — OXYCODONE HYDROCHLORIDE 5 MG/1
5 TABLET ORAL 2 TIMES DAILY PRN
Qty: 60 TABLET | Refills: 0 | Status: CANCELLED | OUTPATIENT
Start: 2025-08-19 | End: 2025-09-18

## 2025-08-20 ENCOUNTER — PATIENT MESSAGE (OUTPATIENT)
Dept: PALLIATIVE MEDICINE | Facility: HOSPITAL | Age: 45
End: 2025-08-20
Payer: COMMERCIAL

## 2025-08-20 DIAGNOSIS — G89.3 CANCER RELATED PAIN: ICD-10-CM

## 2025-08-20 DIAGNOSIS — R11.2 NAUSEA AND VOMITING, UNSPECIFIED VOMITING TYPE: ICD-10-CM

## 2025-08-20 RX ORDER — OXYCODONE HYDROCHLORIDE 5 MG/1
5 TABLET ORAL 2 TIMES DAILY PRN
Qty: 60 TABLET | Refills: 0 | Status: SHIPPED | OUTPATIENT
Start: 2025-08-20 | End: 2025-09-19

## 2025-08-22 ENCOUNTER — HOSPITAL ENCOUNTER (OUTPATIENT)
Dept: CARDIOLOGY | Facility: HOSPITAL | Age: 45
Discharge: HOME | End: 2025-08-22
Payer: COMMERCIAL

## 2025-08-22 VITALS
TEMPERATURE: 97.7 F | SYSTOLIC BLOOD PRESSURE: 101 MMHG | HEIGHT: 59 IN | BODY MASS INDEX: 34.88 KG/M2 | DIASTOLIC BLOOD PRESSURE: 69 MMHG | WEIGHT: 173 LBS | RESPIRATION RATE: 14 BRPM | OXYGEN SATURATION: 100 % | HEART RATE: 70 BPM

## 2025-08-22 DIAGNOSIS — C81.91 HODGKIN LYMPHOMA OF LYMPH NODES OF NECK, UNSPECIFIED HODGKIN LYMPHOMA TYPE (MULTI): ICD-10-CM

## 2025-08-22 PROCEDURE — 7100000009 HC PHASE TWO TIME - INITIAL BASE CHARGE

## 2025-08-22 PROCEDURE — 99152 MOD SED SAME PHYS/QHP 5/>YRS: CPT | Performed by: RADIOLOGY

## 2025-08-22 PROCEDURE — 36590 REMOVAL TUNNELED CV CATH: CPT | Performed by: RADIOLOGY

## 2025-08-22 PROCEDURE — 7100000010 HC PHASE TWO TIME - EACH INCREMENTAL 1 MINUTE

## 2025-08-22 PROCEDURE — 99152 MOD SED SAME PHYS/QHP 5/>YRS: CPT

## 2025-08-22 PROCEDURE — 2500000004 HC RX 250 GENERAL PHARMACY W/ HCPCS (ALT 636 FOR OP/ED): Performed by: RADIOLOGY

## 2025-08-22 RX ORDER — FENTANYL CITRATE 50 UG/ML
INJECTION, SOLUTION INTRAMUSCULAR; INTRAVENOUS
Status: COMPLETED | OUTPATIENT
Start: 2025-08-22 | End: 2025-08-22

## 2025-08-22 RX ORDER — HEPARIN 100 UNIT/ML
500 SYRINGE INTRAVENOUS ONCE AS NEEDED
Status: DISCONTINUED | OUTPATIENT
Start: 2025-08-22 | End: 2025-08-23 | Stop reason: HOSPADM

## 2025-08-22 RX ORDER — LIDOCAINE HYDROCHLORIDE 20 MG/ML
INJECTION, SOLUTION EPIDURAL; INFILTRATION; INTRACAUDAL; PERINEURAL
Status: COMPLETED | OUTPATIENT
Start: 2025-08-22 | End: 2025-08-22

## 2025-08-22 RX ORDER — NALOXONE HYDROCHLORIDE 0.4 MG/ML
0.2 INJECTION, SOLUTION INTRAMUSCULAR; INTRAVENOUS; SUBCUTANEOUS EVERY 5 MIN PRN
Status: DISCONTINUED | OUTPATIENT
Start: 2025-08-22 | End: 2025-08-23 | Stop reason: HOSPADM

## 2025-08-22 RX ORDER — MIDAZOLAM HYDROCHLORIDE 1 MG/ML
INJECTION, SOLUTION INTRAMUSCULAR; INTRAVENOUS
Status: COMPLETED | OUTPATIENT
Start: 2025-08-22 | End: 2025-08-22

## 2025-08-22 RX ORDER — FLUMAZENIL 0.1 MG/ML
0.2 INJECTION INTRAVENOUS ONCE AS NEEDED
Status: DISCONTINUED | OUTPATIENT
Start: 2025-08-22 | End: 2025-08-23 | Stop reason: HOSPADM

## 2025-08-22 RX ORDER — ONDANSETRON HYDROCHLORIDE 2 MG/ML
4 INJECTION, SOLUTION INTRAVENOUS EVERY 6 HOURS PRN
Status: DISCONTINUED | OUTPATIENT
Start: 2025-08-22 | End: 2025-08-23 | Stop reason: HOSPADM

## 2025-08-22 RX ADMIN — FENTANYL CITRATE 100 MCG: 50 INJECTION INTRAMUSCULAR; INTRAVENOUS at 10:54

## 2025-08-22 RX ADMIN — MIDAZOLAM 1 MG: 1 INJECTION INTRAMUSCULAR; INTRAVENOUS at 10:54

## 2025-08-22 RX ADMIN — LIDOCAINE HYDROCHLORIDE 5 ML: 20 INJECTION, SOLUTION EPIDURAL; INFILTRATION; INTRACAUDAL; PERINEURAL at 10:54

## 2025-08-22 ASSESSMENT — PAIN SCALES - GENERAL
PAINLEVEL_OUTOF10: 0 - NO PAIN

## 2025-08-22 ASSESSMENT — PAIN - FUNCTIONAL ASSESSMENT: PAIN_FUNCTIONAL_ASSESSMENT: 0-10

## 2025-08-27 ENCOUNTER — APPOINTMENT (OUTPATIENT)
Dept: RESPIRATORY THERAPY | Facility: HOSPITAL | Age: 45
End: 2025-08-27
Payer: COMMERCIAL

## 2025-08-28 ENCOUNTER — DOCUMENTATION (OUTPATIENT)
Dept: NEUROLOGY | Facility: HOSPITAL | Age: 45
End: 2025-08-28

## 2025-08-28 ENCOUNTER — APPOINTMENT (OUTPATIENT)
Dept: RESPIRATORY THERAPY | Facility: HOSPITAL | Age: 45
End: 2025-08-28
Payer: COMMERCIAL

## 2025-09-02 ENCOUNTER — TELEPHONE (OUTPATIENT)
Dept: PRIMARY CARE | Facility: CLINIC | Age: 45
End: 2025-09-02
Payer: COMMERCIAL

## 2025-09-12 ENCOUNTER — APPOINTMENT (OUTPATIENT)
Dept: RESPIRATORY THERAPY | Facility: HOSPITAL | Age: 45
End: 2025-09-12
Payer: COMMERCIAL

## 2025-11-07 ENCOUNTER — APPOINTMENT (OUTPATIENT)
Dept: HEMATOLOGY/ONCOLOGY | Facility: HOSPITAL | Age: 45
End: 2025-11-07
Payer: COMMERCIAL

## 2025-12-15 ENCOUNTER — APPOINTMENT (OUTPATIENT)
Dept: PULMONOLOGY | Facility: HOSPITAL | Age: 45
End: 2025-12-15
Payer: COMMERCIAL